# Patient Record
Sex: MALE | Race: WHITE | NOT HISPANIC OR LATINO | Employment: FULL TIME | URBAN - METROPOLITAN AREA
[De-identification: names, ages, dates, MRNs, and addresses within clinical notes are randomized per-mention and may not be internally consistent; named-entity substitution may affect disease eponyms.]

---

## 2019-02-06 ENCOUNTER — HOSPITAL ENCOUNTER (OUTPATIENT)
Facility: MEDICAL CENTER | Age: 64
End: 2019-02-07
Attending: INTERNAL MEDICINE | Admitting: INTERNAL MEDICINE
Payer: COMMERCIAL

## 2019-02-06 PROBLEM — E11.9 DIABETES MELLITUS TYPE 2 IN NONOBESE (HCC): Status: ACTIVE | Noted: 2019-02-06
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
## 2019-02-06 PROBLEM — I25.10 CORONARY ARTERY DISEASE: Status: ACTIVE | Noted: 2019-02-06

## 2019-02-06 LAB — GLUCOSE BLD-MCNC: 231 MG/DL (ref 65–99)

## 2019-02-06 PROCEDURE — A9270 NON-COVERED ITEM OR SERVICE: HCPCS | Performed by: INTERNAL MEDICINE

## 2019-02-06 PROCEDURE — 99220 PR INITIAL OBSERVATION CARE,LEVL III: CPT | Performed by: INTERNAL MEDICINE

## 2019-02-06 PROCEDURE — 700102 HCHG RX REV CODE 250 W/ 637 OVERRIDE(OP): Performed by: INTERNAL MEDICINE

## 2019-02-06 PROCEDURE — 82962 GLUCOSE BLOOD TEST: CPT

## 2019-02-06 PROCEDURE — G0378 HOSPITAL OBSERVATION PER HR: HCPCS

## 2019-02-06 RX ORDER — ONDANSETRON 4 MG/1
4 TABLET, ORALLY DISINTEGRATING ORAL EVERY 4 HOURS PRN
Status: DISCONTINUED | OUTPATIENT
Start: 2019-02-06 | End: 2019-02-07 | Stop reason: HOSPADM

## 2019-02-06 RX ORDER — ACETAMINOPHEN 325 MG/1
650 TABLET ORAL EVERY 8 HOURS PRN
Status: DISCONTINUED | OUTPATIENT
Start: 2019-02-06 | End: 2019-02-07 | Stop reason: HOSPADM

## 2019-02-06 RX ORDER — ONDANSETRON 2 MG/ML
4 INJECTION INTRAMUSCULAR; INTRAVENOUS EVERY 4 HOURS PRN
Status: DISCONTINUED | OUTPATIENT
Start: 2019-02-06 | End: 2019-02-07 | Stop reason: HOSPADM

## 2019-02-06 RX ORDER — ENALAPRILAT 1.25 MG/ML
1.25 INJECTION INTRAVENOUS EVERY 6 HOURS PRN
Status: DISCONTINUED | OUTPATIENT
Start: 2019-02-06 | End: 2019-02-07 | Stop reason: HOSPADM

## 2019-02-06 RX ORDER — BISACODYL 10 MG
10 SUPPOSITORY, RECTAL RECTAL
Status: DISCONTINUED | OUTPATIENT
Start: 2019-02-06 | End: 2019-02-07 | Stop reason: HOSPADM

## 2019-02-06 RX ORDER — DEXTROSE MONOHYDRATE 25 G/50ML
25 INJECTION, SOLUTION INTRAVENOUS
Status: DISCONTINUED | OUTPATIENT
Start: 2019-02-06 | End: 2019-02-07 | Stop reason: HOSPADM

## 2019-02-06 RX ORDER — POLYETHYLENE GLYCOL 3350 17 G/17G
1 POWDER, FOR SOLUTION ORAL
Status: DISCONTINUED | OUTPATIENT
Start: 2019-02-06 | End: 2019-02-07 | Stop reason: HOSPADM

## 2019-02-06 RX ORDER — AMOXICILLIN 250 MG
2 CAPSULE ORAL 2 TIMES DAILY
Status: DISCONTINUED | OUTPATIENT
Start: 2019-02-06 | End: 2019-02-07 | Stop reason: HOSPADM

## 2019-02-06 RX ADMIN — ACETAMINOPHEN 650 MG: 325 TABLET, FILM COATED ORAL at 19:06

## 2019-02-06 ASSESSMENT — ENCOUNTER SYMPTOMS
FEVER: 0
HEARTBURN: 0
NERVOUS/ANXIOUS: 0
NECK PAIN: 0
SEIZURES: 0
WEIGHT LOSS: 0
PALPITATIONS: 0
SPUTUM PRODUCTION: 0
INSOMNIA: 0
NAUSEA: 0
DEPRESSION: 0
DIARRHEA: 0
VOMITING: 0
ORTHOPNEA: 0
EYE REDNESS: 0
STRIDOR: 0
BLURRED VISION: 0
CHILLS: 0
ABDOMINAL PAIN: 0
MYALGIAS: 0
FOCAL WEAKNESS: 0
FALLS: 1
EYE PAIN: 0
EYE DISCHARGE: 0
COUGH: 0
HEADACHES: 0
DIZZINESS: 0
BACK PAIN: 0
SHORTNESS OF BREATH: 0

## 2019-02-06 ASSESSMENT — COGNITIVE AND FUNCTIONAL STATUS - GENERAL
MOBILITY SCORE: 24
DAILY ACTIVITIY SCORE: 24
SUGGESTED CMS G CODE MODIFIER MOBILITY: CH
SUGGESTED CMS G CODE MODIFIER DAILY ACTIVITY: CH

## 2019-02-06 ASSESSMENT — PATIENT HEALTH QUESTIONNAIRE - PHQ9
2. FEELING DOWN, DEPRESSED, IRRITABLE, OR HOPELESS: NOT AT ALL
SUM OF ALL RESPONSES TO PHQ9 QUESTIONS 1 AND 2: 0
1. LITTLE INTEREST OR PLEASURE IN DOING THINGS: NOT AT ALL

## 2019-02-06 ASSESSMENT — LIFESTYLE VARIABLES
EVER_SMOKED: YES
ALCOHOL_USE: NO

## 2019-02-06 NOTE — PROGRESS NOTES
Direct admit from [facility], referred by Dr. Tomas. For fall. Admitting MD is Dr. Craft. ADT order signed and held, to be released upon patient's arrival on the unit. Patient arriving via EMS ground. Page admitting hospitalist on call for orders when patient arrives.

## 2019-02-07 VITALS
OXYGEN SATURATION: 93 % | RESPIRATION RATE: 18 BRPM | HEIGHT: 69 IN | DIASTOLIC BLOOD PRESSURE: 64 MMHG | HEART RATE: 74 BPM | SYSTOLIC BLOOD PRESSURE: 119 MMHG | BODY MASS INDEX: 23.84 KG/M2 | WEIGHT: 160.94 LBS | TEMPERATURE: 98.6 F

## 2019-02-07 LAB
ALBUMIN SERPL BCP-MCNC: 3.6 G/DL (ref 3.2–4.9)
ALBUMIN/GLOB SERPL: 1.7 G/DL
ALP SERPL-CCNC: 79 U/L (ref 30–99)
ALT SERPL-CCNC: 21 U/L (ref 2–50)
ANION GAP SERPL CALC-SCNC: 6 MMOL/L (ref 0–11.9)
AST SERPL-CCNC: 14 U/L (ref 12–45)
BILIRUB SERPL-MCNC: 1 MG/DL (ref 0.1–1.5)
BUN SERPL-MCNC: 17 MG/DL (ref 8–22)
CALCIUM SERPL-MCNC: 8.7 MG/DL (ref 8.5–10.5)
CHLORIDE SERPL-SCNC: 104 MMOL/L (ref 96–112)
CO2 SERPL-SCNC: 26 MMOL/L (ref 20–33)
CREAT SERPL-MCNC: 0.88 MG/DL (ref 0.5–1.4)
ERYTHROCYTE [DISTWIDTH] IN BLOOD BY AUTOMATED COUNT: 41.5 FL (ref 35.9–50)
GLOBULIN SER CALC-MCNC: 2.1 G/DL (ref 1.9–3.5)
GLUCOSE BLD-MCNC: 176 MG/DL (ref 65–99)
GLUCOSE BLD-MCNC: 200 MG/DL (ref 65–99)
GLUCOSE SERPL-MCNC: 172 MG/DL (ref 65–99)
HCT VFR BLD AUTO: 45.2 % (ref 42–52)
HGB BLD-MCNC: 14.6 G/DL (ref 14–18)
MCH RBC QN AUTO: 27.5 PG (ref 27–33)
MCHC RBC AUTO-ENTMCNC: 32.3 G/DL (ref 33.7–35.3)
MCV RBC AUTO: 85.1 FL (ref 81.4–97.8)
PLATELET # BLD AUTO: 172 K/UL (ref 164–446)
PMV BLD AUTO: 10.2 FL (ref 9–12.9)
POTASSIUM SERPL-SCNC: 4.1 MMOL/L (ref 3.6–5.5)
PROT SERPL-MCNC: 5.7 G/DL (ref 6–8.2)
RBC # BLD AUTO: 5.31 M/UL (ref 4.7–6.1)
SODIUM SERPL-SCNC: 136 MMOL/L (ref 135–145)
WBC # BLD AUTO: 7.8 K/UL (ref 4.8–10.8)

## 2019-02-07 PROCEDURE — 96372 THER/PROPH/DIAG INJ SC/IM: CPT

## 2019-02-07 PROCEDURE — G0378 HOSPITAL OBSERVATION PER HR: HCPCS

## 2019-02-07 PROCEDURE — 97165 OT EVAL LOW COMPLEX 30 MIN: CPT

## 2019-02-07 PROCEDURE — 82962 GLUCOSE BLOOD TEST: CPT

## 2019-02-07 PROCEDURE — 85027 COMPLETE CBC AUTOMATED: CPT

## 2019-02-07 PROCEDURE — 36415 COLL VENOUS BLD VENIPUNCTURE: CPT

## 2019-02-07 PROCEDURE — 99217 PR OBSERVATION CARE DISCHARGE: CPT | Performed by: INTERNAL MEDICINE

## 2019-02-07 PROCEDURE — 80053 COMPREHEN METABOLIC PANEL: CPT

## 2019-02-07 RX ADMIN — INSULIN HUMAN 1 UNITS: 100 INJECTION, SOLUTION PARENTERAL at 07:41

## 2019-02-07 RX ADMIN — INSULIN HUMAN 1 UNITS: 100 INJECTION, SOLUTION PARENTERAL at 00:15

## 2019-02-07 ASSESSMENT — COGNITIVE AND FUNCTIONAL STATUS - GENERAL
SUGGESTED CMS G CODE MODIFIER DAILY ACTIVITY: CH
DAILY ACTIVITIY SCORE: 24

## 2019-02-07 ASSESSMENT — ACTIVITIES OF DAILY LIVING (ADL): TOILETING: INDEPENDENT

## 2019-02-07 NOTE — PROGRESS NOTES
AOx4, pain has improved since admission. Scratch observed on posterior of head, no drainage. PHELPS, -SOB, denies numbness/tingling. Steady gait, diabetic diet. Pt given copies of paperwork from [facility] for workman's comp, all questions answered regarding POC.

## 2019-02-07 NOTE — PROGRESS NOTES
Assumed pt care at 0700 and received bedside report.    Pt alert and oriented X 4. Pt denies chest pain, sob, nausea and vomiting, headache, and blurry or double vision. Pt denies numbness and tingling. Pt denies pain. Pt ambulating Ad Jeanne, tolerating well. Pt with concerns regarding workman's comp, will contact social work today.   POC discussed and education provided on administered medications. All questions and concerns addressed. Fall precautions, hourly rounding and Q4 hour neuro checks in place.

## 2019-02-07 NOTE — THERAPY
PT orders received. Per RN, pt anticipating DC home soon. Has been ambulating without difficulty. Acute PT eval not warranted at this time, orders DC'ed.     Emani Quintero, PT, DPT Pager: [phone]

## 2019-02-07 NOTE — THERAPY
"Occupational Therapy Evaluation completed.   Functional Status:  Pt presents to skilled OT services following fall resulting in L occipital scalp contusion. Pt was able to perform basic self care tasks, functional mobility and t/f's with supervision/MI. Pt denies any vision changes, HA, sensation, strength and coordination intact. Pt appears to be back to baseline and educated on use of ice for pain management and s/s to observe for any changes; stated understanding.   Plan of Care: Patient with no further skilled OT needs in the acute care setting at this time  Discharge Recommendations:  Equipment: No Equipment Needed. Post-acute therapy Anticipate that the patient will have no further occupational therapy needs after discharge from the hospital.       See \"Rehab Therapy-Acute\" Patient Summary Report for complete documentation.    "

## 2019-02-07 NOTE — PROGRESS NOTES
Patient to discharge Home with wife and daughter. Verbal and written discharge instructions provided to patient , verbalizes understanding. Patient verbalizes understanding. PIV removed. All personal belongings gathered. Pt refusing 11:00 BS check and insulin. Pt ambulated off unit with family and refused hospital escort.

## 2019-02-07 NOTE — DISCHARGE SUMMARY
Discharge Summary    CHIEF COMPLAINT ON ADMISSION  No chief complaint on file.      Reason for Admission  Fall     Admission Date  2/6/2019    CODE STATUS  Full Code    HPI & HOSPITAL COURSE  This is a 63 y.o. male here with PMH of diabetes mellitus, coronary artery disease status post a stent, who presents with above.  The patient stated that earlier today while he was walking he suddenly slipped and fell on the back of his head.  Suddenly after the fall he felt dizzy but he denied syncope episode or any neurological deficit.  After that he went on the dry to go to drive through [place] where he experienced severe vertigo for 2 times.  Therefore he was initially presented to outside facility where he had a CT scan of the head done with no acute finding.  He was taking aspirin and Plavix for his coronary artery disease.  He is here for observation since outside facility do not have inpatient bed.  Patient feels much better at time of discharge, with no pain/headache/dizziness/focal deficits.        Therefore, he is discharged in good and stable condition to home with close outpatient follow-up.    The patient recovered much more quickly than anticipated on admission.    Discharge Date  2/7/19     FOLLOW UP ITEMS POST DISCHARGE  pcp next week    DISCHARGE DIAGNOSES  Active Problems:    Fall POA: Yes    Diabetes mellitus type 2 in nonobese (HCC) POA: Yes    Coronary artery disease POA: Yes  Resolved Problems:    * No resolved hospital problems. *      FOLLOW UP  No future appointments.  No follow-up provider specified.    MEDICATIONS ON DISCHARGE     Medication List      You have not been prescribed any medications.         Allergies  No Known Allergies    DIET  Orders Placed This Encounter   Procedures   • Diet Order Diabetic     Standing Status:   Standing     Number of Occurrences:   1     Order Specific Question:   Diet:     Answer:   Diabetic [3]       ACTIVITY  As tolerated.  Weight bearing as  tolerated    CONSULTATIONS  none    PROCEDURES  none    LABORATORY  Lab Results   Component Value Date    SODIUM 136 02/07/2019    POTASSIUM 4.1 02/07/2019    CHLORIDE 104 02/07/2019    CO2 26 02/07/2019    GLUCOSE 172 (H) 02/07/2019    BUN 17 02/07/2019    CREATININE 0.88 02/07/2019        Lab Results   Component Value Date    WBC 7.8 02/07/2019    HEMOGLOBIN 14.6 02/07/2019    HEMATOCRIT 45.2 02/07/2019    PLATELETCT 172 02/07/2019        Total time of the discharge process exceeds 30 minutes.

## 2019-02-07 NOTE — DISCHARGE PLANNING
Anticipated Discharge Disposition:   Home with help from wife.    Action:    Spoke with patient and assessment completed.  Requested information for worker's compensation processing.  RN CM provided ROBERTO form for patient to fill out and fax to medical records.    Barriers to Discharge:    None    Plan:    DC    Care Transition Team Assessment    Information Source  Orientation : Oriented x 4  Information Given By: Patient  Informant's Name:  (Vlad Edwards)  Who is responsible for making decisions for patient? : Patient    Readmission Evaluation  Is this a readmission?: No    Elopement Risk  Legal Hold: No  Ambulatory or Self Mobile in Wheelchair: Yes  Disoriented: No  Psychiatric Symptoms: None  History of Wandering: No  Elopement this Admit: No  Vocalizing Wanting to Leave: No  Displays Behaviors, Body Language Wanting to Leave: No-Not at Risk for Elopement  Elopement Risk: Not at Risk for Elopement    Interdisciplinary Discharge Planning  Lives with - Patient's Self Care Capacity: Spouse  Housing / Facility: [address] [place] [address]  Prior Services: None    Discharge Preparedness  What is your plan after discharge?: Home with help  What are your discharge supports?: Spouse  Prior Functional Level: Ambulatory, Drives Self, Independent with Activities of Daily Living, Independent with Medication Management  Difficulity with ADLs: None  Difficulity with IADLs: None    Functional Assesment  Prior Functional Level: Ambulatory, Drives Self, Independent with Activities of Daily Living, Independent with Medication Management    Finances  Financial Barriers to Discharge: No    Vision / Hearing Impairment  Vision Impairment : Yes  Right Eye Vision: Wears Glasses  Left Eye Vision: Wears Glasses  Hearing Impairment : No    Values / Beliefs / Concerns  Values / Beliefs Concerns : No    Advance Directive  Advance Directive?: None    Domestic Abuse  Have you ever been the victim of abuse or violence?: No  Physical Abuse or Sexual Abuse:  No  Verbal Abuse or Emotional Abuse: No  Possible Abuse Reported to:: Not Applicable         Discharge Risks or Barriers  Discharge risks or barriers?: No    Anticipated Discharge Information  Anticipated discharge disposition: Home  Discharge Address: [facility] [address] [facility] [address]  Discharge Contact Phone Number: [phone]

## 2019-02-07 NOTE — DISCHARGE INSTRUCTIONS
Discharge Instructions    F/u with pcp next week and return to ED for worsening symptoms    Discharged to home by car with relative. Discharged via walking, hospital escort: Yes.  Special equipment needed: Not Applicable    Be sure to schedule a follow-up appointment with your primary care doctor or any specialists as instructed.     Discharge Plan:   Diet Plan: Discussed  Activity Level: Discussed  Smoking Cessation Offered: Patient Refused  Confirmed Follow up Appointment: Patient to Call and Schedule Appointment  Confirmed Symptoms Management: Discussed  Medication Reconciliation Updated: Yes  Pneumococcal Vaccine Administered/Refused: Not given - Patient refused pneumococcal vaccine  Influenza Vaccine Indication: Patient Refuses    I understand that a diet low in cholesterol, fat, and sodium is recommended for good health. Unless I have been given specific instructions below for another diet, I accept this instruction as my diet prescription.   Other diet: Diabetic    Special Instructions: None    · Is patient discharged on Warfarin / Coumadin?   No     Depression / Suicide Risk    As you are discharged from this [facility] facility, it is important to learn how to keep safe from harming yourself.    Recognize the warning signs:  · Abrupt changes in personality, positive or negative- including increase in energy   · Giving away possessions  · Change in eating patterns- significant weight changes-  positive or negative  · Change in sleeping patterns- unable to sleep or sleeping all the time   · Unwillingness or inability to communicate  · Depression  · Unusual sadness, discouragement and loneliness  · Talk of wanting to die  · Neglect of personal appearance   · Rebelliousness- reckless behavior  · Withdrawal from people/activities they love  · Confusion- inability to concentrate     If you or a loved one observes any of these behaviors or has concerns about self-harm, here's what you can do:  · Talk about it-  "your feelings and reasons for harming yourself  · Remove any means that you might use to hurt yourself (examples: pills, rope, extension cords, firearm)  · Get professional help from the community (Mental Health, Substance Abuse, psychological counseling)  · Do not be alone:Call your Safe Contact- someone whom you trust who will be there for you.  · Call your local CRISIS HOTLINE 177-9098 or 511-932-8108  · Call your local Children's Mobile Crisis Response Team [place] (799) 507-4213 or wwwSkype  · Call the toll free National Suicide Prevention Hotlines   · National Suicide Prevention Lifeline 711-354-JTFK (7999)  · [employer] Line Network 800-SUICIDE (727-7054)    Prevent Falls in Your Home    \"Falling once doubles your chance of falling again\"        -Center for Disease Control and Prevention    Falls in the home can lead to serious injury (fractures, brain injuries), hospitalizations, increased medical costs, and could even be fatal.  The good news is, there are many precautions you can take to avoid falls in your home and help keep you safe:     · If prescribed an assistive device (walker, crutches), use as instructed by the healthcare provider\"   · Remove any tripping hazards from your home, including loose cords, throw rugs and clutter  · Keep a nightlight on in dark (hallways, bathrooms, etc)   · Get up slowly, to make sure you feel okay before getting up  · Be aware of any side effects of your medications: some medications may make you dizzy  · Place a non-skid rubber mat in your shower or tub-consider a shower bench or chair if unsteady on your feet  · Wear supportive shoes or non-skid socks when moving around  · Start an exercise program once approved by your provider.  If you are feeling weak following a hospital stay, talk to your doctor about home health or outpatient therapy programs designed to help rebuild your strength and endurance      Get help right away if:  · You have:  ¨ A " very bad (severe) headache that is not helped by medicine.  ¨ Trouble walking or weakness in your arms and legs.  ¨ Clear or bloody fluid coming from your nose or ears.  ¨ Changes in your seeing (vision).  ¨ Jerky movements that you cannot control (seizure).  · You throw up (vomit).  · Your symptoms get worse.  · You lose balance.  · Your speech is slurred.  · You pass out.  · You are sleepier and have trouble staying awake.  · The black centers of your eyes (pupils) change in size.  These symptoms may be an emergency. Do not wait to see if the symptoms will go away. Get medical help right away. Call your local emergency services (911 in the [place] Do not drive yourself to the hospital.   This information is not intended to replace advice given to you by your health care provider. Make sure you discuss any questions you have with your health care provider.  Document Released: 11/30/2009 Document Revised: 07/13/2017 Document Reviewed: 06/27/2017  Elsevier Interactive Patient Education © 2017 Elsevier Inc.

## 2019-02-07 NOTE — ASSESSMENT & PLAN NOTE
No acute issue on CT scan at outside facility is a small left parietal occipital scalp contusion without skull fracture or intracranial abnormality  Monitor neuro check every 4 hours  If noticed to have new onset neurologic deficit will get CT scan stat  Holding his aspirin and Plavix  PT and OT  If remain asymptomatic consider discharging home tomorrow

## 2019-02-07 NOTE — PROGRESS NOTES
Pt arrived approx 1700. Aox4. PHELPS. Denies n/t. Denies vertigo at this time. Wants to eat. Provided snack and water. Contreras called for dinner tray. Reporting headache, Dr. Craft notified, orders received, tylenol given. Family at bedside. Educated on call light, bed controls, fall precautions, bed alarm, calling for assistance. Verbalized understanding. Family at bedside.

## 2019-02-07 NOTE — H&P
Hospital Medicine History and Physical      Date of Service  2/6/2019    Chief Complaint  Direct admit from fall with left occipital scalp contusion    History of Presenting Illness  Jerry is a 63 y.o. male PMH of diabetes mellitus, coronary artery disease status post a stent, who presents with above.  The patient stated that earlier today while he was walking he suddenly slipped and fell on the back of his head.  Suddenly after the fall he felt dizzy but he denied syncope episode or any neurological deficit.  After that he went on the dry to go to drive through [facility] where he experienced severe vertigo for 2 times.  Therefore he was initially presented to outside facility where he had a CT scan of the head done with no acute finding.  He was taking aspirin and Plavix for his coronary artery disease.  He is here for observation since outside facility do not have inpatient bed.    Primary Care Physician  Luis Burciaga M.D.      Code Status  Full code    Review of Systems  Review of Systems   Constitutional: Negative for chills, fever and weight loss.   HENT: Negative for congestion and nosebleeds.    Eyes: Negative for blurred vision, pain, discharge and redness.   Respiratory: Negative for cough, sputum production, shortness of breath and stridor.    Cardiovascular: Negative for chest pain, palpitations and orthopnea.   Gastrointestinal: Negative for abdominal pain, diarrhea, heartburn, nausea and vomiting.   Genitourinary: Negative for dysuria, frequency and urgency.   Musculoskeletal: Positive for falls. Negative for back pain, myalgias and neck pain.   Skin: Negative for itching and rash.   Neurological: Negative for dizziness, focal weakness, seizures and headaches.   Psychiatric/Behavioral: Negative for depression. The patient is not nervous/anxious and does not have insomnia.      Please see HPI, all other systems were reviewed and are negative (AMA/CMS criteria)     Past Medical History  Past Medical  History:   Diagnosis Date   • MI, old        Surgical History  Past Surgical History:   Procedure Laterality Date   • OTHER      cardiac stents   • OTHER ORTHOPEDIC SURGERY      back surgery       Medications  No current facility-administered medications on file prior to encounter.      No current outpatient prescriptions on file prior to encounter.     Family History    No pertinent family history    Social History  Social History   Substance Use Topics   • Smoking status: Current Every Day Smoker     Packs/day: 1.00     Types: Cigarettes   • Smokeless tobacco: Not on file   • Alcohol use No       Allergies  No Known Allergies     Physical Exam  Laboratory   Hemodynamics  Temp (24hrs), Av.3 °C (97.3 °F), Min:36.3 °C (97.3 °F), Max:36.3 °C (97.3 °F)   Temperature: 36.3 °C (97.3 °F)  Pulse  Av  Min: 76  Max: 76    Blood Pressure : 140/64      Respiratory      Respiration: 15, Pulse Oximetry: 93 %             Physical Exam   Constitutional: He is oriented to person, place, and time. No distress.   HENT:   Head: Normocephalic and atraumatic.   Mouth/Throat: Oropharynx is clear and moist.   Eyes: Pupils are equal, round, and reactive to light. Conjunctivae and EOM are normal.   Neck: Normal range of motion. Neck supple. No tracheal deviation present. No thyromegaly present.   Cardiovascular: Normal rate and regular rhythm.    No murmur heard.  Pulmonary/Chest: Effort normal and breath sounds normal. No respiratory distress. He has no wheezes.   Abdominal: Soft. Bowel sounds are normal. He exhibits no distension. There is no tenderness.   Musculoskeletal: He exhibits no edema or tenderness.   Neurological: He is alert and oriented to person, place, and time. No cranial nerve deficit.   Skin: Skin is warm and dry. He is not diaphoretic. No erythema.   Psychiatric: He has a normal mood and affect. His behavior is normal. Thought content normal.               No results for input(s): ALTSGPT, ASTSGOT, ALKPHOSPHAT,  TBILIRUBIN, DBILIRUBIN, GAMMAGT, AMYLASE, LIPASE, ALB, PREALBUMIN, GLUCOSE in the last 72 hours.              No results found for: TROPONINI    Imaging  No orders to display     Labs:  Hemoglobin 15, WBC 10, platelet 180  Troponin 0 0.04, BNP 31, sodium 136, potassium 4.6, CO2 26, chloride 102, glucose 177, BUN 18, creatinine 0.78    CT scan of the head as below  Cervical spine x-ray no acute issue identified   Assessment/Plan     I anticipate this patient is appropriate for observation status at this time.    Coronary artery disease- (present on admission)   Assessment & Plan    History of a stents in the past  Currently holding aspirin and Plavix  Stable     Diabetes mellitus type 2 in nonobese (HCC)- (present on admission)   Assessment & Plan    On insulin management     Fall- (present on admission)   Assessment & Plan    No acute issue on CT scan at outside facility is a small left parietal occipital scalp contusion without skull fracture or intracranial abnormality  Monitor neuro check every 4 hours  If noticed to have new onset neurologic deficit will get CT scan stat  Holding his aspirin and Plavix  PT and OT  If remain asymptomatic consider discharging home tomorrow         Prophylaxis:  SCDs

## 2021-05-10 ENCOUNTER — APPOINTMENT (OUTPATIENT)
Dept: CT IMAGING | Age: 66
DRG: 689 | End: 2021-05-10
Payer: MEDICARE

## 2021-05-10 ENCOUNTER — HOSPITAL ENCOUNTER (INPATIENT)
Age: 66
LOS: 5 days | Discharge: HOME | DRG: 689 | End: 2021-05-15
Attending: EMERGENCY MEDICINE | Admitting: STUDENT IN AN ORGANIZED HEALTH CARE EDUCATION/TRAINING PROGRAM
Payer: MEDICARE

## 2021-05-10 DIAGNOSIS — R31.9 HEMATURIA, UNSPECIFIED TYPE: ICD-10-CM

## 2021-05-10 DIAGNOSIS — R33.9 URINARY RETENTION: Primary | ICD-10-CM

## 2021-05-10 DIAGNOSIS — N17.9 AKI (ACUTE KIDNEY INJURY) (HCC): ICD-10-CM

## 2021-05-10 PROBLEM — R31.0 GROSS HEMATURIA: Status: ACTIVE | Noted: 2021-05-10

## 2021-05-10 PROBLEM — F17.210 HEAVY CIGARETTE SMOKER (20-39 PER DAY): Status: ACTIVE | Noted: 2021-05-10

## 2021-05-10 PROBLEM — Z80.0 FAMILY HISTORY OF STOMACH CANCER: Status: ACTIVE | Noted: 2021-05-10

## 2021-05-10 PROBLEM — F41.8 ANXIETY ABOUT HEALTH: Status: ACTIVE | Noted: 2021-05-10

## 2021-05-10 PROBLEM — Z82.0 FAMILY HISTORY OF ALZHEIMER'S DISEASE: Status: ACTIVE | Noted: 2021-05-10

## 2021-05-10 LAB
-: ABNORMAL
ABSOLUTE EOS #: 0.13 K/UL (ref 0–0.44)
ABSOLUTE IMMATURE GRANULOCYTE: 0.04 K/UL (ref 0–0.3)
ABSOLUTE LYMPH #: 2.55 K/UL (ref 1.1–3.7)
ABSOLUTE MONO #: 0.69 K/UL (ref 0.1–1.2)
AMORPHOUS: ABNORMAL
ANION GAP SERPL CALCULATED.3IONS-SCNC: 11 MMOL/L (ref 9–17)
BACTERIA: ABNORMAL
BASOPHILS # BLD: 1 % (ref 0–2)
BASOPHILS ABSOLUTE: 0.05 K/UL (ref 0–0.2)
BILIRUBIN URINE: NEGATIVE
BUN BLDV-MCNC: 14 MG/DL (ref 8–23)
BUN/CREAT BLD: 8 (ref 9–20)
CALCIUM SERPL-MCNC: 9.4 MG/DL (ref 8.6–10.4)
CASTS UA: ABNORMAL /LPF
CHLORIDE BLD-SCNC: 102 MMOL/L (ref 98–107)
CO2: 25 MMOL/L (ref 20–31)
COLOR: ABNORMAL
COMMENT UA: ABNORMAL
CREAT SERPL-MCNC: 1.74 MG/DL (ref 0.7–1.2)
CRYSTALS, UA: ABNORMAL /HPF
DIFFERENTIAL TYPE: ABNORMAL
EOSINOPHILS RELATIVE PERCENT: 1 % (ref 1–4)
EPITHELIAL CELLS UA: ABNORMAL /HPF (ref 0–5)
GFR AFRICAN AMERICAN: 48 ML/MIN
GFR NON-AFRICAN AMERICAN: 39 ML/MIN
GFR SERPL CREATININE-BSD FRML MDRD: ABNORMAL ML/MIN/{1.73_M2}
GFR SERPL CREATININE-BSD FRML MDRD: ABNORMAL ML/MIN/{1.73_M2}
GLUCOSE BLD-MCNC: 106 MG/DL (ref 70–99)
GLUCOSE URINE: NEGATIVE
HCT VFR BLD CALC: 35.6 % (ref 40.7–50.3)
HEMOGLOBIN: 11.2 G/DL (ref 13–17)
IMMATURE GRANULOCYTES: 0 %
KETONES, URINE: ABNORMAL
LEUKOCYTE ESTERASE, URINE: ABNORMAL
LYMPHOCYTES # BLD: 24 % (ref 24–43)
MCH RBC QN AUTO: 27.9 PG (ref 25.2–33.5)
MCHC RBC AUTO-ENTMCNC: 31.5 G/DL (ref 28.4–34.8)
MCV RBC AUTO: 88.6 FL (ref 82.6–102.9)
MONOCYTES # BLD: 7 % (ref 3–12)
MUCUS: ABNORMAL
NITRITE, URINE: POSITIVE
NRBC AUTOMATED: 0 PER 100 WBC
OTHER OBSERVATIONS UA: ABNORMAL
PDW BLD-RTO: 13.5 % (ref 11.8–14.4)
PH UA: 7 (ref 5–8)
PLATELET # BLD: 303 K/UL (ref 138–453)
PLATELET ESTIMATE: ABNORMAL
PMV BLD AUTO: 8.3 FL (ref 8.1–13.5)
POTASSIUM SERPL-SCNC: 3.2 MMOL/L (ref 3.7–5.3)
PROSTATE SPECIFIC ANTIGEN: 43.54 UG/L
PROTEIN UA: ABNORMAL
RBC # BLD: 4.02 M/UL (ref 4.21–5.77)
RBC # BLD: ABNORMAL 10*6/UL
RBC UA: ABNORMAL /HPF (ref 0–2)
RENAL EPITHELIAL, UA: ABNORMAL /HPF
SEG NEUTROPHILS: 67 % (ref 36–65)
SEGMENTED NEUTROPHILS ABSOLUTE COUNT: 7.08 K/UL (ref 1.5–8.1)
SODIUM BLD-SCNC: 138 MMOL/L (ref 135–144)
SPECIFIC GRAVITY UA: 1.02 (ref 1–1.03)
TRICHOMONAS: ABNORMAL
TURBIDITY: ABNORMAL
URINE HGB: ABNORMAL
UROBILINOGEN, URINE: ABNORMAL
WBC # BLD: 10.5 K/UL (ref 3.5–11.3)
WBC # BLD: ABNORMAL 10*3/UL
WBC UA: ABNORMAL /HPF (ref 0–5)
YEAST: ABNORMAL

## 2021-05-10 PROCEDURE — 2580000003 HC RX 258: Performed by: NURSE PRACTITIONER

## 2021-05-10 PROCEDURE — 2060000000 HC ICU INTERMEDIATE R&B

## 2021-05-10 PROCEDURE — 51798 US URINE CAPACITY MEASURE: CPT

## 2021-05-10 PROCEDURE — 84153 ASSAY OF PSA TOTAL: CPT

## 2021-05-10 PROCEDURE — 6360000002 HC RX W HCPCS: Performed by: NURSE PRACTITIONER

## 2021-05-10 PROCEDURE — 87040 BLOOD CULTURE FOR BACTERIA: CPT

## 2021-05-10 PROCEDURE — 81001 URINALYSIS AUTO W/SCOPE: CPT

## 2021-05-10 PROCEDURE — 74176 CT ABD & PELVIS W/O CONTRAST: CPT

## 2021-05-10 PROCEDURE — 96365 THER/PROPH/DIAG IV INF INIT: CPT

## 2021-05-10 PROCEDURE — 80048 BASIC METABOLIC PNL TOTAL CA: CPT

## 2021-05-10 PROCEDURE — 99222 1ST HOSP IP/OBS MODERATE 55: CPT | Performed by: STUDENT IN AN ORGANIZED HEALTH CARE EDUCATION/TRAINING PROGRAM

## 2021-05-10 PROCEDURE — 99283 EMERGENCY DEPT VISIT LOW MDM: CPT

## 2021-05-10 PROCEDURE — 2580000003 HC RX 258: Performed by: STUDENT IN AN ORGANIZED HEALTH CARE EDUCATION/TRAINING PROGRAM

## 2021-05-10 PROCEDURE — 83036 HEMOGLOBIN GLYCOSYLATED A1C: CPT

## 2021-05-10 PROCEDURE — 85025 COMPLETE CBC W/AUTO DIFF WBC: CPT

## 2021-05-10 PROCEDURE — 6360000002 HC RX W HCPCS: Performed by: STUDENT IN AN ORGANIZED HEALTH CARE EDUCATION/TRAINING PROGRAM

## 2021-05-10 PROCEDURE — 6370000000 HC RX 637 (ALT 250 FOR IP): Performed by: STUDENT IN AN ORGANIZED HEALTH CARE EDUCATION/TRAINING PROGRAM

## 2021-05-10 PROCEDURE — 93005 ELECTROCARDIOGRAM TRACING: CPT | Performed by: STUDENT IN AN ORGANIZED HEALTH CARE EDUCATION/TRAINING PROGRAM

## 2021-05-10 RX ORDER — NICOTINE 21 MG/24HR
1 PATCH, TRANSDERMAL 24 HOURS TRANSDERMAL DAILY
Status: DISCONTINUED | OUTPATIENT
Start: 2021-05-10 | End: 2021-05-10

## 2021-05-10 RX ORDER — FAMOTIDINE 20 MG/1
20 TABLET, FILM COATED ORAL DAILY
Status: DISCONTINUED | OUTPATIENT
Start: 2021-05-10 | End: 2021-05-15 | Stop reason: HOSPADM

## 2021-05-10 RX ORDER — SODIUM CHLORIDE 9 MG/ML
INJECTION, SOLUTION INTRAVENOUS CONTINUOUS
Status: DISCONTINUED | OUTPATIENT
Start: 2021-05-10 | End: 2021-05-15

## 2021-05-10 RX ORDER — ACETAMINOPHEN 650 MG/1
650 SUPPOSITORY RECTAL EVERY 6 HOURS PRN
Status: DISCONTINUED | OUTPATIENT
Start: 2021-05-10 | End: 2021-05-15 | Stop reason: HOSPADM

## 2021-05-10 RX ORDER — PROMETHAZINE HYDROCHLORIDE 12.5 MG/1
12.5 TABLET ORAL EVERY 6 HOURS PRN
Status: DISCONTINUED | OUTPATIENT
Start: 2021-05-10 | End: 2021-05-15 | Stop reason: HOSPADM

## 2021-05-10 RX ORDER — ACETAMINOPHEN 325 MG/1
650 TABLET ORAL EVERY 6 HOURS PRN
Status: DISCONTINUED | OUTPATIENT
Start: 2021-05-10 | End: 2021-05-15 | Stop reason: HOSPADM

## 2021-05-10 RX ORDER — SODIUM CHLORIDE 0.9 % (FLUSH) 0.9 %
5-40 SYRINGE (ML) INJECTION EVERY 12 HOURS SCHEDULED
Status: DISCONTINUED | OUTPATIENT
Start: 2021-05-10 | End: 2021-05-15 | Stop reason: HOSPADM

## 2021-05-10 RX ORDER — ALPRAZOLAM 0.25 MG/1
0.5 TABLET ORAL 3 TIMES DAILY PRN
Status: DISCONTINUED | OUTPATIENT
Start: 2021-05-10 | End: 2021-05-15 | Stop reason: HOSPADM

## 2021-05-10 RX ORDER — SENNA AND DOCUSATE SODIUM 50; 8.6 MG/1; MG/1
1 TABLET, FILM COATED ORAL DAILY
COMMUNITY

## 2021-05-10 RX ORDER — LIDOCAINE HYDROCHLORIDE 20 MG/ML
5 JELLY TOPICAL PRN
Status: DISCONTINUED | OUTPATIENT
Start: 2021-05-10 | End: 2021-05-15 | Stop reason: HOSPADM

## 2021-05-10 RX ORDER — SODIUM CHLORIDE 9 MG/ML
25 INJECTION, SOLUTION INTRAVENOUS PRN
Status: DISCONTINUED | OUTPATIENT
Start: 2021-05-10 | End: 2021-05-15 | Stop reason: HOSPADM

## 2021-05-10 RX ORDER — NICOTINE 21 MG/24HR
1 PATCH, TRANSDERMAL 24 HOURS TRANSDERMAL DAILY
Status: DISCONTINUED | OUTPATIENT
Start: 2021-05-10 | End: 2021-05-15 | Stop reason: HOSPADM

## 2021-05-10 RX ORDER — SODIUM CHLORIDE 0.9 % (FLUSH) 0.9 %
5-40 SYRINGE (ML) INJECTION PRN
Status: DISCONTINUED | OUTPATIENT
Start: 2021-05-10 | End: 2021-05-15 | Stop reason: HOSPADM

## 2021-05-10 RX ORDER — LORAZEPAM 2 MG/ML
0.5 INJECTION INTRAMUSCULAR ONCE
Status: COMPLETED | OUTPATIENT
Start: 2021-05-10 | End: 2021-05-10

## 2021-05-10 RX ORDER — MORPHINE SULFATE 4 MG/ML
4 INJECTION, SOLUTION INTRAMUSCULAR; INTRAVENOUS
Status: DISCONTINUED | OUTPATIENT
Start: 2021-05-10 | End: 2021-05-15 | Stop reason: HOSPADM

## 2021-05-10 RX ORDER — ONDANSETRON 2 MG/ML
4 INJECTION INTRAMUSCULAR; INTRAVENOUS EVERY 6 HOURS PRN
Status: DISCONTINUED | OUTPATIENT
Start: 2021-05-10 | End: 2021-05-15 | Stop reason: HOSPADM

## 2021-05-10 RX ORDER — MORPHINE SULFATE 2 MG/ML
2 INJECTION, SOLUTION INTRAMUSCULAR; INTRAVENOUS
Status: DISCONTINUED | OUTPATIENT
Start: 2021-05-10 | End: 2021-05-15 | Stop reason: HOSPADM

## 2021-05-10 RX ADMIN — LORAZEPAM 0.5 MG: 2 INJECTION, SOLUTION INTRAMUSCULAR; INTRAVENOUS at 20:07

## 2021-05-10 RX ADMIN — CEFTRIAXONE SODIUM 1000 MG: 1 INJECTION, POWDER, FOR SOLUTION INTRAMUSCULAR; INTRAVENOUS at 18:32

## 2021-05-10 RX ADMIN — SODIUM CHLORIDE: 9 INJECTION, SOLUTION INTRAVENOUS at 20:07

## 2021-05-10 RX ADMIN — MORPHINE SULFATE 4 MG: 4 INJECTION, SOLUTION INTRAMUSCULAR; INTRAVENOUS at 23:18

## 2021-05-10 SDOH — HEALTH STABILITY: MENTAL HEALTH: HOW OFTEN DO YOU HAVE A DRINK CONTAINING ALCOHOL?: NEVER

## 2021-05-10 ASSESSMENT — ENCOUNTER SYMPTOMS
CONSTIPATION: 0
NAUSEA: 0
ABDOMINAL PAIN: 0
SINUS PRESSURE: 0
WHEEZING: 0
SHORTNESS OF BREATH: 0
COUGH: 0
VOMITING: 0
COLOR CHANGE: 0
RHINORRHEA: 0
DIARRHEA: 0
SORE THROAT: 0

## 2021-05-10 ASSESSMENT — PAIN DESCRIPTION - DESCRIPTORS: DESCRIPTORS: BURNING

## 2021-05-10 ASSESSMENT — PAIN DESCRIPTION - FREQUENCY: FREQUENCY: INTERMITTENT

## 2021-05-10 ASSESSMENT — PAIN DESCRIPTION - LOCATION: LOCATION: PENIS

## 2021-05-10 NOTE — ED PROVIDER NOTES
[address] [place] [address] ED  EMERGENCY DEPARTMENT ENCOUNTER   ATTENDING ATTESTATION     Pt Name: Luann Evans  MRN: [id number]  Sonali 1955  Date of evaluation: 5/10/21       Luann Evans is a 77 y.o. male who presents with Hematuria (onset this am, sent from urgent care)      MDM:     57-year-old male, gross hematuria with urinary retention. Plan is Paige catheter insertion, basic labs urine and reevaluation. Vitals:   Vitals:    05/10/21 1447   BP: (!) 183/99   Pulse: 113   Resp: 16   Temp: 98 °F (36.7 °C)   SpO2: 100%   Weight: 152 lb (68.9 kg)   Height: 6' (1.829 m)         I personally evaluated and examined the patient in conjunction with the Midlevel provider and agree with the assessment, treatment plan, and disposition of the patient as recorded by the midlevel. I performed a history and physical examination of the patient and discussed management with the midlevel. I reviewed the midlevels note and agree with the documented findings and plan of care. Any areas of disagreement are noted on the chart. I was personally present for the key portions of any procedures. I have documented in the chart those procedures where I was not present during the key portions. I have personally reviewed all images and agree with the midlevel's interpretation. I have reviewed the emergency nurses triage note. I agree with the chief complaint, past medical history, past surgical history, allergies, medications, social and family history as documented unless otherwise noted.     Shantanu Cooper MD  Attending Emergency  Physician                 Misha Amos MD  05/10/21 6754

## 2021-05-10 NOTE — ED NOTES
Pt was seen at urgent care prior to coming to ed and was told to come to ed for further evaluation. Pt states he does not currently have a pcp.       Izzy Osorio RN  05/10/21 2881

## 2021-05-10 NOTE — ED NOTES
Pt states he does not want an Iv placed until he speaks to Dr. Otis Hanks. Pt states he is unsure if he wants to be admitted.       An Gonzalez RN  05/10/21 9911

## 2021-05-11 PROBLEM — N13.30 BILATERAL HYDRONEPHROSIS: Status: ACTIVE | Noted: 2021-05-11

## 2021-05-11 PROBLEM — N40.1 BPH WITH OBSTRUCTION/LOWER URINARY TRACT SYMPTOMS: Status: ACTIVE | Noted: 2021-05-11

## 2021-05-11 PROBLEM — N13.8 BPH WITH OBSTRUCTION/LOWER URINARY TRACT SYMPTOMS: Status: ACTIVE | Noted: 2021-05-11

## 2021-05-11 PROBLEM — R97.20 ELEVATED PSA: Status: ACTIVE | Noted: 2021-05-11

## 2021-05-11 PROBLEM — N17.9 AKI (ACUTE KIDNEY INJURY) (HCC): Status: ACTIVE | Noted: 2021-05-11

## 2021-05-11 LAB
ANION GAP SERPL CALCULATED.3IONS-SCNC: 15 MMOL/L (ref 9–17)
BUN BLDV-MCNC: 17 MG/DL (ref 8–23)
BUN/CREAT BLD: 6 (ref 9–20)
CALCIUM SERPL-MCNC: 8.9 MG/DL (ref 8.6–10.4)
CHLORIDE BLD-SCNC: 105 MMOL/L (ref 98–107)
CO2: 22 MMOL/L (ref 20–31)
CREAT SERPL-MCNC: 2.84 MG/DL (ref 0.7–1.2)
EKG ATRIAL RATE: 135 BPM
EKG P AXIS: 81 DEGREES
EKG P-R INTERVAL: 114 MS
EKG Q-T INTERVAL: 318 MS
EKG QRS DURATION: 88 MS
EKG QTC CALCULATION (BAZETT): 477 MS
EKG R AXIS: 78 DEGREES
EKG T AXIS: 63 DEGREES
EKG VENTRICULAR RATE: 135 BPM
ESTIMATED AVERAGE GLUCOSE: 85 MG/DL
GFR AFRICAN AMERICAN: 27 ML/MIN
GFR NON-AFRICAN AMERICAN: 22 ML/MIN
GFR SERPL CREATININE-BSD FRML MDRD: ABNORMAL ML/MIN/{1.73_M2}
GFR SERPL CREATININE-BSD FRML MDRD: ABNORMAL ML/MIN/{1.73_M2}
GLUCOSE BLD-MCNC: 102 MG/DL (ref 70–99)
HBA1C MFR BLD: 4.6 % (ref 4–6)
MAGNESIUM: 1.8 MG/DL (ref 1.6–2.6)
POTASSIUM SERPL-SCNC: 3.4 MMOL/L (ref 3.7–5.3)
SODIUM BLD-SCNC: 142 MMOL/L (ref 135–144)

## 2021-05-11 PROCEDURE — 2060000000 HC ICU INTERMEDIATE R&B

## 2021-05-11 PROCEDURE — 6370000000 HC RX 637 (ALT 250 FOR IP): Performed by: UROLOGY

## 2021-05-11 PROCEDURE — 93010 ELECTROCARDIOGRAM REPORT: CPT | Performed by: INTERNAL MEDICINE

## 2021-05-11 PROCEDURE — 80048 BASIC METABOLIC PNL TOTAL CA: CPT

## 2021-05-11 PROCEDURE — 6370000000 HC RX 637 (ALT 250 FOR IP): Performed by: STUDENT IN AN ORGANIZED HEALTH CARE EDUCATION/TRAINING PROGRAM

## 2021-05-11 PROCEDURE — 6360000002 HC RX W HCPCS: Performed by: STUDENT IN AN ORGANIZED HEALTH CARE EDUCATION/TRAINING PROGRAM

## 2021-05-11 PROCEDURE — 6360000002 HC RX W HCPCS: Performed by: NURSE PRACTITIONER

## 2021-05-11 PROCEDURE — 2580000003 HC RX 258: Performed by: STUDENT IN AN ORGANIZED HEALTH CARE EDUCATION/TRAINING PROGRAM

## 2021-05-11 PROCEDURE — 99232 SBSQ HOSP IP/OBS MODERATE 35: CPT | Performed by: INTERNAL MEDICINE

## 2021-05-11 PROCEDURE — 83735 ASSAY OF MAGNESIUM: CPT

## 2021-05-11 RX ORDER — OXYBUTYNIN CHLORIDE 5 MG/1
5 TABLET ORAL 3 TIMES DAILY
Status: DISCONTINUED | OUTPATIENT
Start: 2021-05-11 | End: 2021-05-11

## 2021-05-11 RX ORDER — OXYBUTYNIN CHLORIDE 5 MG/1
5 TABLET ORAL 3 TIMES DAILY PRN
Status: DISCONTINUED | OUTPATIENT
Start: 2021-05-11 | End: 2021-05-15 | Stop reason: HOSPADM

## 2021-05-11 RX ADMIN — OXYBUTYNIN CHLORIDE 5 MG: 5 TABLET ORAL at 17:30

## 2021-05-11 RX ADMIN — MORPHINE SULFATE 4 MG: 4 INJECTION, SOLUTION INTRAMUSCULAR; INTRAVENOUS at 20:12

## 2021-05-11 RX ADMIN — ALPRAZOLAM 0.5 MG: 0.25 TABLET ORAL at 17:30

## 2021-05-11 RX ADMIN — CEFTRIAXONE SODIUM 1000 MG: 1 INJECTION, POWDER, FOR SOLUTION INTRAMUSCULAR; INTRAVENOUS at 17:10

## 2021-05-11 RX ADMIN — OXYBUTYNIN CHLORIDE 5 MG: 5 TABLET ORAL at 08:30

## 2021-05-11 RX ADMIN — MORPHINE SULFATE 2 MG: 2 INJECTION, SOLUTION INTRAMUSCULAR; INTRAVENOUS at 08:47

## 2021-05-11 RX ADMIN — ALPRAZOLAM 0.5 MG: 0.25 TABLET ORAL at 01:12

## 2021-05-11 RX ADMIN — FAMOTIDINE 20 MG: 20 TABLET ORAL at 08:30

## 2021-05-11 RX ADMIN — SODIUM CHLORIDE, PRESERVATIVE FREE 10 ML: 5 INJECTION INTRAVENOUS at 20:12

## 2021-05-11 ASSESSMENT — PAIN SCALES - GENERAL
PAINLEVEL_OUTOF10: 0
PAINLEVEL_OUTOF10: 0
PAINLEVEL_OUTOF10: 3
PAINLEVEL_OUTOF10: 5
PAINLEVEL_OUTOF10: 7

## 2021-05-11 ASSESSMENT — PAIN DESCRIPTION - PAIN TYPE: TYPE: ACUTE PAIN

## 2021-05-11 ASSESSMENT — PAIN DESCRIPTION - PROGRESSION: CLINICAL_PROGRESSION: GRADUALLY IMPROVING

## 2021-05-11 ASSESSMENT — PAIN DESCRIPTION - FREQUENCY: FREQUENCY: INTERMITTENT

## 2021-05-11 ASSESSMENT — PAIN DESCRIPTION - LOCATION: LOCATION: PENIS;OTHER (COMMENT)

## 2021-05-11 ASSESSMENT — PAIN DESCRIPTION - DESCRIPTORS: DESCRIPTORS: ACHING;CRAMPING

## 2021-05-11 ASSESSMENT — PAIN DESCRIPTION - ORIENTATION: ORIENTATION: MID

## 2021-05-11 NOTE — PROGRESS NOTES
Transitions of Care Pharmacy Service   Medication Review    The patient's list of current home medications has been reviewed. Source(s) of information: Patient    Based on information provided by the above source(s), I have updated the patient's home med list as described below. Please review the ACTION REQUESTED section of this note below for any discrepancies on current hospital orders. I changed or updated the following medications on the patient's home medication list:  Removed Ziac 2.5/6.25 1 daily - list clean up  Xanax 0.5mg tid prn - list clean up     Added none     Adjusted   none   Other Notes Pt only taking OTC medications. PROVIDER ACTION REQUESTED  Medications that need to be addressed by a physician/nurse practitioner:    Medication Action Requested   Xanax     Please dc as appropriate , not a home med. Please feel free to call me with any questions about this encounter. Thank you. John Brandon, [address] [place] [address]   Transitions of Care Pharmacy Service  Phone:  [phone]  Fax: [phone]      Electronically signed by John Brandon [address] [place] [address] on 5/11/2021 at 11:03 AM           Not in a hospital admission.

## 2021-05-11 NOTE — ED NOTES
Pt not having very much output. Pt monge manually irrigated. multiple clots noted. Draining well again.       Jamie Bauer, RN  05/11/21 5963

## 2021-05-11 NOTE — ED NOTES
Brief filled with blood. Brief changed. Pt resting comfortably.       Muna Hidalgo RN  05/11/21 4219

## 2021-05-11 NOTE — ED NOTES
Pt having bladder spasms. Pt covered in urine. Pt brief changed, bedding changed.       Trisha Pinedo RN  05/11/21 3273

## 2021-05-11 NOTE — ED NOTES
Writer told pt he is to be NPO per the admitting dr. Delphine Son states he does not want any procedures and that he will eat.       Lorraine Husain RN  05/10/21 2030

## 2021-05-11 NOTE — PROGRESS NOTES
Physical Therapy      DATE: 2021    NAME: Daquan Watts  MRN: [id number]   : 1955      Patient not seen this date for Physical Therapy due to:      Bladder flushing    Electronically signed by Regina Jones PT on 2021 at 5:46 PM

## 2021-05-11 NOTE — PROGRESS NOTES
[facility]  Office: [address], DO, Yajaira Misael, DO, Juan Galdamez, DO, Sandi Jc Blood, DO, Madiha Olguin MD, Yessica Yanez MD, Reese Padilla MD, Deedee Brunner, MD, Tracey Horne MD, Makenzie Silver MD, Duran Landis MD, Minal White MD, Lesa Davis DO, Kimmie Rodrigues MD, Mima Esteban, DO, Mark Ramos MD,  Vinny Reyes DO, Ramon Soliz MD, Krystal Moses MD, Woo Muhammad MD, hCrist Ba MD, Carla Bateman, [facility], [address], [facility], [facility], [facility], Lindsay Ortiz, CNS, Isaias Torres, CNP, Nola Phalen, CNP, Carlos Verduzco, CNP, Fuad Guillen, CNP, Binh Grewal, CNP, MORGAN HectorC, Min Gayle, PAU, Danielle Velarde, CNP, Derrick Alvarez, CNP, Suzanne Sepulveda, CNP, Nahomy Medeiros, CNP, Joseph Curran, CNP, Lianne Delgadoha, [facility]    Progress Note    5/11/2021    5:03 PM    Name:   Fina Martinez  MRN:     [id number]     [place]:      [de-identified]   Room:   1001/1001-02   Day:  1  Admit Date:  5/10/2021  3:02 PM    PCP:   No primary care provider on file. Code Status:  Full Code    Subjective:     C/C:   Chief Complaint   Patient presents with    Hematuria     onset this am, sent from urgent care     Interval History Status: not changed. Patient is resting comfortably awaiting bed placement. Denies any chest pain, shortness of breath, nausea or vomiting, fevers or chills. Paige catheter in place with continued gross hematuria. Brief History: This is a 61-year-old male that presents with new onset of gross hematuria that started in the morning of May 10. Initially presented to an urgent care and was sent to the emergency room for evaluation. Is found to have bilateral hydronephrosis as well as bladder outlet obstruction mostly related to prostatic hypertrophy. His evidence of acute kidney injury versus chronic kidney disease at presentation.   Creatinine has continued to rise and is consistent with acute kidney injury. Review of Systems:     Constitutional:  negative for chills, fevers, sweats  Respiratory:  negative for cough, dyspnea on exertion, shortness of breath, wheezing  Cardiovascular:  negative for chest pain, chest pressure/discomfort, lower extremity edema, palpitations  Gastrointestinal:  negative for abdominal pain, constipation, diarrhea, nausea, vomiting  Neurological:  negative for dizziness, headache    Medications: Allergies:  No Known Allergies    Current Meds:   Scheduled Meds:    nicotine  1 patch Transdermal Daily    sodium chloride flush  5-40 mL Intravenous 2 times per day    famotidine  20 mg Oral Daily    cefTRIAXone (ROCEPHIN) IV  1,000 mg Intravenous Q24H     Continuous Infusions:    sodium chloride      sodium chloride 75 mL/hr at 05/10/21 2007     PRN Meds: oxybutynin, lidocaine, ALPRAZolam, sodium chloride flush, sodium chloride, promethazine **OR** ondansetron, magnesium hydroxide, acetaminophen **OR** acetaminophen, morphine **OR** morphine    Data:     Past Medical History:   has a past medical history of Hypertension. Social History:   reports that he has been smoking cigarettes. He has been smoking about 2.00 packs per day. He has never used smokeless tobacco. He reports that he does not drink alcohol or use drugs. Family History: History reviewed. No pertinent family history. Vitals:  BP (!) 153/89   Pulse 105   Temp 97.7 °F (36.5 °C) (Oral)   Resp 20   Ht 6' (1.829 m)   Wt 152 lb (68.9 kg)   SpO2 99%   BMI 20.61 kg/m²   Temp (24hrs), Av.9 °F (36.6 °C), Min:97.7 °F (36.5 °C), Max:98 °F (36.7 °C)    No results for input(s): POCGLU in the last 72 hours. I/O (24Hr):     Intake/Output Summary (Last 24 hours) at 2021 1703  Last data filed at 2021 1702  Gross per 24 hour   Intake 4000 ml   Output 46524 ml   Net -59076 ml       Labs:  Hematology:  Recent Labs     05/10/21  1526   WBC 10.5   RBC 4.02*   HGB 11.2*   HCT 35.6*   MCV 88.6   MCH 27.9   MCHC 31.5   RDW 13.5      MPV 8.3     Chemistry:  Recent Labs     05/10/21  1526 05/11/21  0540    142   K 3.2* 3.4*    105   CO2 25 22   GLUCOSE 106* 102*   BUN 14 17   CREATININE 1.74* 2.84*   MG  --  1.8   ANIONGAP 11 15   LABGLOM 39* 22*   GFRAA 48* 27*   CALCIUM 9.4 8.9   PSA 43.54*  --      Recent Labs     05/10/21  1526   LABA1C 4.6     ABG:No results found for: POCPH, PHART, PH, POCPCO2, KBS5ZKF, PCO2, POCPO2, PO2ART, PO2, POCHCO3, NQU5IRD, HCO3, NBEA, PBEA, BEART, BE, THGBART, THB, FUG1XCQ, QDHX3WIP, Y3RTAHNA, O2SAT, FIO2  Lab Results   Component Value Date/Time    SPECIAL RT [facility] 05/10/2021 06:10 PM     Lab Results   Component Value Date/Time    CULTURE NO GROWTH 9 HOURS 05/10/2021 06:10 PM       Radiology:  Ct Abdomen Pelvis Wo Contrast Additional Contrast? None    Result Date: 5/10/2021  1. Findings compatible with bladder outlet obstruction, likely secondary to prostatomegaly, with associated bilateral moderately severe hydroureteronephrosis. Underlying cystitis is also likely present. 2.  3 mm layering nonobstructing stone in the distal left ureter. Bilateral nephrolithiasis is also present.        Physical Examination:        General appearance:  alert, cooperative and no distress  Mental Status:  oriented to person, place and time and normal affect  Lungs:  clear to auscultation bilaterally, normal effort  Heart:  regular rate and rhythm, no murmur  Abdomen:  soft, nontender, nondistended, normal bowel sounds, no masses, hepatomegaly, splenomegaly  Extremities:  no edema, redness, tenderness in the calves  Skin:  no gross lesions, rashes, induration    Assessment:        Hospital Problems           Last Modified POA    * (Principal) Bilateral hydronephrosis due to bladder outlet obstruction 5/11/2021 Yes    Gross hematuria 5/11/2021 Yes    Heavy cigarette smoker (20-39 per day) 5/10/2021 Yes    Family history of stomach cancer 5/10/2021 Yes Family history of Alzheimer's disease 5/10/2021 Yes    Anxiety about health 5/10/2021 Yes    Elevated PSA 5/11/2021 Yes    SANDEEP (acute kidney injury) ([address] 5/11/2021 Yes    BPH with obstruction/lower urinary tract symptoms 5/11/2021 Yes          Plan:        Continue IV hydration, increase fluid rate to 125 mL/h  Avoid nephrotoxic agents  Urology consultation  Continue present bags pending urine culture  GI and DVT prophylaxis, no chemical prophylaxis due to acute bleeding  Tobacco cessation  May eventually require cystoscopy to evaluate for etiology of bleeding.   Bleeding likely related to prostate hypertrophy, rule out prostate cancer  Correct electrolytes as needed  Discussed with family at bedside    Malathi Jauregui DO  5/11/2021  5:03 PM

## 2021-05-11 NOTE — PLAN OF CARE
Problem: Falls - Risk of:  Goal: Will remain free from falls  Description: Will remain free from falls  Outcome: Ongoing  Note: Siderails up x 2  Hourly rounding  Call light in reach  Instructed to call for assist before attempting out of bed. Remains free from falls and accidental injury at this time   Floor free from obstacles  Bed is locked and in lowest position  Adequate lighting provided  Bed alarm on, Red [place] star and Stay with Me signs posted      Goal: Absence of physical injury  Description: Absence of physical injury  Outcome: Ongoing     Problem: SAFETY  Goal: Free from accidental physical injury  Outcome: Ongoing  Goal: Free from intentional harm  Outcome: Ongoing     Problem: DAILY CARE  Goal: Daily care needs are met  Outcome: Ongoing     Problem: PAIN  Goal: Patient's pain/discomfort is manageable  Outcome: Ongoing     Problem: SKIN INTEGRITY  Goal: Skin integrity is maintained or improved  Outcome: Ongoing     Problem: KNOWLEDGE DEFICIT  Goal: Patient/S.O. demonstrates understanding of disease process, treatment plan, medications, and discharge instructions.   Outcome: Ongoing     Problem: DISCHARGE BARRIERS  Goal: Patient's continuum of care needs are met  Outcome: Ongoing

## 2021-05-11 NOTE — CONSULTS
[facility]  Urology Consultation    Patient:  Suri Otero  MRN: [id number]  YOB: 1955    CHIEF COMPLAINT: Gross hematuria    HISTORY OF PRESENT ILLNESS:   The patient is a 77 y.o. male who presents with gross hematuria of recent onset, patient has been on Macrobid and Bactrim patient has not followed up with a primary care physician for quite some time he has a history of heavy smoking. CT imaging demonstrated bilateral hydroureteronephrosis and reflux obstructive uropathy as well as an enlarged prostate. A 3 mm nonobstructing stone noted as well as bilateral nephrolithiasis    Patient's old records, notes and chart reviewed and summarized above. Past Medical History:    Past Medical History:   Diagnosis Date    Hypertension        Past Surgical History:    Past Surgical History:   Procedure Laterality Date    BACK SURGERY      HERNIA REPAIR      TONSILLECTOMY       Previous  surgery: none     Medications:    Scheduled Meds:   nicotine  1 patch Transdermal Daily    sodium chloride flush  5-40 mL Intravenous 2 times per day    famotidine  20 mg Oral Daily    cefTRIAXone (ROCEPHIN) IV  1,000 mg Intravenous Q24H     Continuous Infusions:   sodium chloride      sodium chloride 75 mL/hr at 05/10/21 2007     PRN Meds:.lidocaine, ALPRAZolam, sodium chloride flush, sodium chloride, promethazine **OR** ondansetron, magnesium hydroxide, acetaminophen **OR** acetaminophen, morphine **OR** morphine    Allergies:  Patient has no known allergies.     Social History:    Social History     Socioeconomic History    Marital status:      Spouse name: Not on file    Number of children: Not on file    Years of education: Not on file    Highest education level: Not on file   Occupational History    Not on file   Social Needs    Financial resource strain: Not on file    Food insecurity     Worry: Not on file     Inability: Not on file    Transportation needs     Medical: Not on file     Non-medical: Not on file   Tobacco Use    Smoking status: Current Every Day Smoker     Packs/day: 2.00     Types: Cigarettes    Smokeless tobacco: Never Used   Substance and Sexual Activity    Alcohol use: Never     Frequency: Never    Drug use: Never    Sexual activity: Not on file   Lifestyle    Physical activity     Days per week: Not on file     Minutes per session: Not on file    Stress: Not on file   Relationships    Social connections     Talks on phone: Not on file     Gets together: Not on file     Attends Restorationist service: Not on file     Active member of club or organization: Not on file     Attends meetings of clubs or organizations: Not on file     Relationship status: Not on file    Intimate partner violence     Fear of current or ex partner: Not on file     Emotionally abused: Not on file     Physically abused: Not on file     Forced sexual activity: Not on file   Other Topics Concern    Not on file   Social History Narrative    Not on file       Family History:  History reviewed. No pertinent family history. Previous Urologic Family history: none    REVIEW OF SYSTEMS:  Constitutional: negative  Eyes: negative  Respiratory: negative  Cardiovascular: negative  Gastrointestinal: negative  Genitourinary: see HPI  Musculoskeletal: negative  Skin: negative   Neurological: negative  Hematological/Lymphatic: negative  Psychological: negative    Physical Exam:    This a 77 y.o. male   Patient Vitals for the past 24 hrs:   BP Temp Pulse Resp SpO2 Height Weight   05/11/21 0121 (!) 186/101 -- 110 -- 100 % -- --   05/10/21 1447 (!) 183/99 98 °F (36.7 °C) 113 16 100 % 6' (1.829 m) 152 lb (68.9 kg)     Constitutional: Patient in no acute distress; Neuro: alert and oriented to person place and time.     Psych: Mood and affect normal.  Skin: Normal  Lungs: Respiratory effort normal  Cardiovascular:  Normal peripheral pulses  Abdomen: Soft, non-tender, non-distended with no CVA, flank pain, hepatosplenomegaly or hernia. Kidneys normal.  Bladder tender to palpation, decompressed with Paige, patient still passing clots, we will start patient on oxybutynin. Lymphatics: no palpable lymphadenopathy  Penis normal and circumcised  Urethral meatus normal catheter in place  Scrotal exam normal  Testicles normal bilaterally  Epididymis normal bilaterally  No evidence of inguinal hernia     LABS:  Recent Labs     05/10/21  1526   WBC 10.5   HGB 11.2*   HCT 35.6*   MCV 88.6        Recent Labs     05/10/21  1526      K 3.2*      CO2 25   BUN 14   CREATININE 1.74*     Lab Results   Component Value Date    PSA 43.54 (H) 05/10/2021       Additional Lab/culture results:    Urinalysis:   Recent Labs     05/10/21  1530   COLORU RED*   PHUR 7.0   WBCUA 0 TO 2   RBCUA TOO NUMEROUS TO COUNT   MUCUS NOT REPORTED   TRICHOMONAS NOT REPORTED   YEAST NOT REPORTED   BACTERIA NOT REPORTED   SPECGRAV 1.020   LEUKOCYTESUR MODERATE*   UROBILINOGEN ELEVATED*   BILIRUBINUR NEGATIVE        -----------------------------------------------------------------  Imaging Results:  .  Findings compatible with bladder outlet obstruction, likely secondary to   prostatomegaly, with associated bilateral moderately severe   hydroureteronephrosis.  Underlying cystitis is also likely present.       2.  3 mm layering nonobstructing stone in the distal left ureter.  Bilateral   nephrolithiasis is also present         Assessment and Plan   Impression: Gross hematuria,   markedly elevated serum PSA  Bilateral hydronephrosis  Acute kidney injury  Patient Active Problem List   Diagnosis    Gross hematuria    Heavy cigarette smoker (20-39 per day)    Family history of stomach cancer    Family history of Alzheimer's disease    Anxiety about health       Plan:  We will continue bladder irrigation until clear, we will repeat CT imaging after hematuria has cleared and proceed with cystoscopy retrograde pyelogram in a.m. after CT helen Bañuelos  4:06 AM 5/11/2021

## 2021-05-11 NOTE — ED PROVIDER NOTES
[address] [place] [address] ED  eMERGENCY dEPARTMENT eNCOUnter      Pt Name: Luann Evans  MRN: [id number]  [place] 1955  Date of evaluation: 5/10/2021  Provider: Demetrius Hamilton NP, FARHAD Kor[address] [id number]       Chief Complaint   Patient presents with    Hematuria     onset this am, sent from urgent care         HISTORY OF PRESENT ILLNESS  (Location/Symptom, Timing/Onset, Context/Setting, Quality, Duration, Modifying Factors, Severity.)   Luann Evans is a 77 y.o. male who presents to the emergency department private vehicle for evaluation of hematuria. Patient states that since 2:00 this morning he has had bright red blood in his urine. He states he is also had some mild running with urination. He denies any associated fevers or chills. The daughter reports that he had a UTI roughly a month and a half ago and was treated with Bactrim. He states that he has not seen a doctor in over 15 years. Denies any cough or shortness of breath. Nursing Notes were reviewed. ALLERGIES     Patient has no known allergies. CURRENT MEDICATIONS       Previous Medications    ALPRAZOLAM (XANAX) 0.5 MG TABLET    Take 0.5 mg by mouth 3 times daily as needed for Sleep. Take 1 tab TID PRN for Anxiety    BISOPROLOL-HYDROCHLOROTHIAZIDE (ZIAC) 2.5-6.25 MG PER TABLET    Take 1 tablet by mouth daily Take 1 PO QD    CRANBERRY PO    Take by mouth daily 3 tabs daily    NONFORMULARY    superbeta prostate BID    SENNOSIDES-DOCUSATE SODIUM (SENOKOT-S) 8.6-50 MG TABLET    Take 1 tablet by mouth daily       PAST MEDICAL HISTORY         Diagnosis Date    Hypertension        SURGICAL HISTORY           Procedure Laterality Date    BACK SURGERY      HERNIA REPAIR      TONSILLECTOMY           FAMILY HISTORY     History reviewed. No pertinent family history. No family status information on file. SOCIAL HISTORY      reports that he has been smoking cigarettes. He has been smoking about 2.00 packs per day.  He has never used and dry. Findings: No rash. Neurological:      Mental Status: He is alert and oriented to person, place, and time. DIAGNOSTIC RESULTS         RADIOLOGY:   Non-plain film images such as CT, Ultrasound and MRI are read by the radiologist. Wanda Renee radiographic images are visualized and preliminarily interpreted by the emergency physician with the below findings:    Ct Abdomen Pelvis Wo Contrast Additional Contrast? None    Result Date: 5/10/2021  EXAMINATION: CT OF THE ABDOMEN AND PELVIS WITHOUT CONTRAST 5/10/2021 3:34 pm TECHNIQUE: CT of the abdomen and pelvis was performed without the administration of intravenous contrast. Multiplanar reformatted images are provided for review. Dose modulation, iterative reconstruction, and/or weight based adjustment of the mA/kV was utilized to reduce the radiation dose to as low as reasonably achievable. COMPARISON: None. HISTORY: ORDERING SYSTEM PROVIDED HISTORY: hematuria TECHNOLOGIST PROVIDED HISTORY: hematuria Decision Support Exception - unselect if not a suspected or confirmed emergency medical condition->Emergency Medical Condition (MA) Reason for Exam: Blood in urine, dysuria, starting today. H/O hernia repair Acuity: Acute Type of Exam: Initial FINDINGS: LOWER CHEST: No focal abnormality. KIDNEYS AND URINARY TRACT: Bilateral moderately severe hydronephrosis and hydroureter to the level of the bladder is noted. A small layering stone measuring 3 mm in the distal left ureter is noted. Bilateral nephrolithiasis is noted with small curvilinear stones. Distended bladder with diffuse bladder wall thickening and mild induration of the adjacent fat. ORGANS: Lack of intravenous contrast limits evaluation of the solid organs and bowel. The liver, gallbladder, pancreas, spleen, and adrenals reveal no acute abnormality. GI/BOWEL: No bowel dilatation or wall thickening. PELVIS: Bladder findings, as above. Prostatomegaly is noted.   The penile urethra appears dilated. PERITONEUM/RETROPERITONEUM: No lymphadenopathy is noted. No free air or free fluid. The aorta is normal in caliber. BONES/SOFT TISSUES: No acute osseous abnormality is identified. 1.  Findings compatible with bladder outlet obstruction, likely secondary to prostatomegaly, with associated bilateral moderately severe hydroureteronephrosis. Underlying cystitis is also likely present. 2.  3 mm layering nonobstructing stone in the distal left ureter. Bilateral nephrolithiasis is also present. Interpretation per the Radiologist below, if available at the time of this note:    CT ABDOMEN PELVIS WO CONTRAST Additional Contrast? None   Final Result   1. Findings compatible with bladder outlet obstruction, likely secondary to   prostatomegaly, with associated bilateral moderately severe   hydroureteronephrosis. Underlying cystitis is also likely present. 2.  3 mm layering nonobstructing stone in the distal left ureter. Bilateral   nephrolithiasis is also present.                  LABS:  Labs Reviewed   URINE RT REFLEX TO CULTURE - Abnormal; Notable for the following components:       Result Value    Color, UA RED (*)     Turbidity UA CLOUDY (*)     Ketones, Urine 1+ (*)     Urine Hgb 3+ (*)     Protein, UA 3+ (*)     Urobilinogen, Urine ELEVATED (*)     Nitrite, Urine POSITIVE (*)     Leukocyte Esterase, Urine MODERATE (*)     All other components within normal limits   CBC WITH AUTO DIFFERENTIAL - Abnormal; Notable for the following components:    RBC 4.02 (*)     Hemoglobin 11.2 (*)     Hematocrit 35.6 (*)     Seg Neutrophils 67 (*)     All other components within normal limits   BASIC METABOLIC PANEL - Abnormal; Notable for the following components:    Glucose 106 (*)     CREATININE 1.74 (*)     Bun/Cre Ratio 8 (*)     Potassium 3.2 (*)     GFR Non- 39 (*)     GFR  48 (*)     All other components within normal limits   MICROSCOPIC URINALYSIS - Abnormal; Notable for the following components:    Other Observations UA   (*)     Value: INTERPRET WITH CAUTION DUE TO INTENSE COLOR OF URINE. All other components within normal limits   CULTURE, BLOOD 1   CULTURE, BLOOD 1   BASIC METABOLIC PANEL W/ REFLEX TO MG FOR LOW K   HEMOGLOBIN A1C   PSA, DIAGNOSTIC       All other labs were within normal range or not returned as of this dictation. EMERGENCY DEPARTMENT COURSE and DIFFERENTIAL DIAGNOSIS/MDM:   Vitals:    Vitals:    05/10/21 1447   BP: (!) 183/99   Pulse: 113   Resp: 16   Temp: 98 °F (36.7 °C)   SpO2: 100%   Weight: 152 lb (68.9 kg)   Height: 6' (1.829 m)       Medical Decision Making: Discussed this case and findings with Dr. Brynn Valencia. He was made aware of all findings. I discussed this case with . Internal medicine. 3 way monge catheter was placed with bladder irrigation. CONSULTS:  IP CONSULT TO UROLOGY  IP CONSULT TO SOCIAL WORK      FINAL IMPRESSION      1. Urinary retention    2. Hematuria, unspecified type          DISPOSITION/PLAN   DISPOSITION Admitted 05/10/2021 07:30:13 PM      PATIENT REFERRED TO:   No follow-up provider specified.     DISCHARGE MEDICATIONS:     New Prescriptions    No medications on file           (Please note that portions of this note were completed with a voice recognition program.  Efforts were made to edit the dictations but occasionally words are mis-transcribed.)    Jennifer Salgado NP, FARHAD Lazaro CNP  Certified Nurse Practitioner          FARHAD Irvin CNP  05/10/21 1903

## 2021-05-11 NOTE — ED NOTES
500 mL output. Irrigation continued. Pt states the spasms are still occurring.       Evan Spencer, RN  05/11/21 6394

## 2021-05-11 NOTE — ED NOTES
Pt bleeding around the catheter. Pt balloon catheter increased to 20mL. Pt cleaned new brief applied. Irrigation paused.       Jaime Bauer, RN  05/10/21 213       Jamie Bauer, RN  05/10/21 1783

## 2021-05-12 ENCOUNTER — APPOINTMENT (OUTPATIENT)
Dept: GENERAL RADIOLOGY | Age: 66
DRG: 689 | End: 2021-05-12
Payer: MEDICARE

## 2021-05-12 ENCOUNTER — ANESTHESIA EVENT (OUTPATIENT)
Dept: OPERATING ROOM | Age: 66
DRG: 689 | End: 2021-05-12
Payer: MEDICARE

## 2021-05-12 ENCOUNTER — ANESTHESIA (OUTPATIENT)
Dept: OPERATING ROOM | Age: 66
DRG: 689 | End: 2021-05-12
Payer: MEDICARE

## 2021-05-12 ENCOUNTER — APPOINTMENT (OUTPATIENT)
Dept: CT IMAGING | Age: 66
DRG: 689 | End: 2021-05-12
Payer: MEDICARE

## 2021-05-12 VITALS — TEMPERATURE: 92.1 F | DIASTOLIC BLOOD PRESSURE: 51 MMHG | SYSTOLIC BLOOD PRESSURE: 94 MMHG | OXYGEN SATURATION: 100 %

## 2021-05-12 PROBLEM — E43 SEVERE MALNUTRITION (HCC): Status: ACTIVE | Noted: 2021-05-12

## 2021-05-12 LAB
-: ABNORMAL
ABSOLUTE EOS #: 0.34 K/UL (ref 0–0.44)
ABSOLUTE IMMATURE GRANULOCYTE: 0.04 K/UL (ref 0–0.3)
ABSOLUTE LYMPH #: 3.24 K/UL (ref 1.1–3.7)
ABSOLUTE MONO #: 0.67 K/UL (ref 0.1–1.2)
AMORPHOUS: ABNORMAL
ANION GAP SERPL CALCULATED.3IONS-SCNC: 12 MMOL/L (ref 9–17)
BACTERIA: ABNORMAL
BASOPHILS # BLD: 1 % (ref 0–2)
BASOPHILS ABSOLUTE: 0.06 K/UL (ref 0–0.2)
BILIRUBIN URINE: NEGATIVE
BUN BLDV-MCNC: 16 MG/DL (ref 8–23)
BUN/CREAT BLD: 7 (ref 9–20)
CALCIUM SERPL-MCNC: 8.5 MG/DL (ref 8.6–10.4)
CASTS UA: ABNORMAL /LPF
CHLORIDE BLD-SCNC: 106 MMOL/L (ref 98–107)
CO2: 23 MMOL/L (ref 20–31)
COLOR: ABNORMAL
COMMENT UA: ABNORMAL
CREAT SERPL-MCNC: 2.37 MG/DL (ref 0.7–1.2)
CRYSTALS, UA: ABNORMAL /HPF
DIFFERENTIAL TYPE: ABNORMAL
EOSINOPHILS RELATIVE PERCENT: 3 % (ref 1–4)
EPITHELIAL CELLS UA: ABNORMAL /HPF (ref 0–5)
GFR AFRICAN AMERICAN: 33 ML/MIN
GFR NON-AFRICAN AMERICAN: 28 ML/MIN
GFR SERPL CREATININE-BSD FRML MDRD: ABNORMAL ML/MIN/{1.73_M2}
GFR SERPL CREATININE-BSD FRML MDRD: ABNORMAL ML/MIN/{1.73_M2}
GLUCOSE BLD-MCNC: 103 MG/DL (ref 70–99)
GLUCOSE URINE: NEGATIVE
HCT VFR BLD CALC: 29.4 % (ref 40.7–50.3)
HEMOGLOBIN: 9.1 G/DL (ref 13–17)
IMMATURE GRANULOCYTES: 0 %
KETONES, URINE: ABNORMAL
LEUKOCYTE ESTERASE, URINE: ABNORMAL
LYMPHOCYTES # BLD: 31 % (ref 24–43)
MAGNESIUM: 1.7 MG/DL (ref 1.6–2.6)
MCH RBC QN AUTO: 28 PG (ref 25.2–33.5)
MCHC RBC AUTO-ENTMCNC: 31 G/DL (ref 28.4–34.8)
MCV RBC AUTO: 90.5 FL (ref 82.6–102.9)
MONOCYTES # BLD: 6 % (ref 3–12)
MUCUS: ABNORMAL
NITRITE, URINE: POSITIVE
NRBC AUTOMATED: 0 PER 100 WBC
OTHER OBSERVATIONS UA: ABNORMAL
PDW BLD-RTO: 13.4 % (ref 11.8–14.4)
PH UA: 7 (ref 5–8)
PLATELET # BLD: 247 K/UL (ref 138–453)
PLATELET ESTIMATE: ABNORMAL
PMV BLD AUTO: 8.2 FL (ref 8.1–13.5)
POTASSIUM SERPL-SCNC: 3.4 MMOL/L (ref 3.7–5.3)
PROTEIN UA: ABNORMAL
RBC # BLD: 3.25 M/UL (ref 4.21–5.77)
RBC # BLD: ABNORMAL 10*6/UL
RBC UA: ABNORMAL /HPF (ref 0–2)
RENAL EPITHELIAL, UA: ABNORMAL /HPF
SARS-COV-2, RAPID: NOT DETECTED
SEG NEUTROPHILS: 59 % (ref 36–65)
SEGMENTED NEUTROPHILS ABSOLUTE COUNT: 6.13 K/UL (ref 1.5–8.1)
SODIUM BLD-SCNC: 141 MMOL/L (ref 135–144)
SPECIFIC GRAVITY UA: 1.02 (ref 1–1.03)
SPECIMEN DESCRIPTION: NORMAL
TRICHOMONAS: ABNORMAL
TURBIDITY: ABNORMAL
URINE HGB: ABNORMAL
UROBILINOGEN, URINE: ABNORMAL
WBC # BLD: 10.5 K/UL (ref 3.5–11.3)
WBC # BLD: ABNORMAL 10*3/UL
WBC UA: ABNORMAL /HPF (ref 0–5)
YEAST: ABNORMAL

## 2021-05-12 PROCEDURE — 2709999900 HC NON-CHARGEABLE SUPPLY: Performed by: UROLOGY

## 2021-05-12 PROCEDURE — 97535 SELF CARE MNGMENT TRAINING: CPT

## 2021-05-12 PROCEDURE — 36415 COLL VENOUS BLD VENIPUNCTURE: CPT

## 2021-05-12 PROCEDURE — 88304 TISSUE EXAM BY PATHOLOGIST: CPT

## 2021-05-12 PROCEDURE — 6370000000 HC RX 637 (ALT 250 FOR IP): Performed by: UROLOGY

## 2021-05-12 PROCEDURE — 99232 SBSQ HOSP IP/OBS MODERATE 35: CPT | Performed by: INTERNAL MEDICINE

## 2021-05-12 PROCEDURE — 87635 SARS-COV-2 COVID-19 AMP PRB: CPT

## 2021-05-12 PROCEDURE — 2580000003 HC RX 258: Performed by: UROLOGY

## 2021-05-12 PROCEDURE — 81001 URINALYSIS AUTO W/SCOPE: CPT

## 2021-05-12 PROCEDURE — 7100000001 HC PACU RECOVERY - ADDTL 15 MIN: Performed by: UROLOGY

## 2021-05-12 PROCEDURE — 6360000002 HC RX W HCPCS: Performed by: NURSE PRACTITIONER

## 2021-05-12 PROCEDURE — 97166 OT EVAL MOD COMPLEX 45 MIN: CPT

## 2021-05-12 PROCEDURE — 2060000000 HC ICU INTERMEDIATE R&B

## 2021-05-12 PROCEDURE — BT141ZZ FLUOROSCOPY OF KIDNEYS, URETERS AND BLADDER USING LOW OSMOLAR CONTRAST: ICD-10-PCS | Performed by: UROLOGY

## 2021-05-12 PROCEDURE — 85025 COMPLETE CBC W/AUTO DIFF WBC: CPT

## 2021-05-12 PROCEDURE — 3700000000 HC ANESTHESIA ATTENDED CARE: Performed by: UROLOGY

## 2021-05-12 PROCEDURE — 2580000003 HC RX 258: Performed by: NURSE ANESTHETIST, CERTIFIED REGISTERED

## 2021-05-12 PROCEDURE — 97530 THERAPEUTIC ACTIVITIES: CPT

## 2021-05-12 PROCEDURE — 0DCB8ZZ EXTIRPATION OF MATTER FROM ILEUM, VIA NATURAL OR ARTIFICIAL OPENING ENDOSCOPIC: ICD-10-PCS | Performed by: UROLOGY

## 2021-05-12 PROCEDURE — 2500000003 HC RX 250 WO HCPCS

## 2021-05-12 PROCEDURE — C1758 CATHETER, URETERAL: HCPCS | Performed by: UROLOGY

## 2021-05-12 PROCEDURE — 6370000000 HC RX 637 (ALT 250 FOR IP): Performed by: STUDENT IN AN ORGANIZED HEALTH CARE EDUCATION/TRAINING PROGRAM

## 2021-05-12 PROCEDURE — 80048 BASIC METABOLIC PNL TOTAL CA: CPT

## 2021-05-12 PROCEDURE — 3700000001 HC ADD 15 MINUTES (ANESTHESIA): Performed by: UROLOGY

## 2021-05-12 PROCEDURE — 3209999900 FLUORO FOR SURGICAL PROCEDURES

## 2021-05-12 PROCEDURE — 3600000002 HC SURGERY LEVEL 2 BASE: Performed by: UROLOGY

## 2021-05-12 PROCEDURE — 2500000003 HC RX 250 WO HCPCS: Performed by: NURSE ANESTHETIST, CERTIFIED REGISTERED

## 2021-05-12 PROCEDURE — 2580000003 HC RX 258: Performed by: INTERNAL MEDICINE

## 2021-05-12 PROCEDURE — 51700 IRRIGATION OF BLADDER: CPT

## 2021-05-12 PROCEDURE — 74176 CT ABD & PELVIS W/O CONTRAST: CPT

## 2021-05-12 PROCEDURE — 88120 CYTP URNE 3-5 PROBES EA SPEC: CPT

## 2021-05-12 PROCEDURE — 83735 ASSAY OF MAGNESIUM: CPT

## 2021-05-12 PROCEDURE — 6360000002 HC RX W HCPCS: Performed by: NURSE ANESTHETIST, CERTIFIED REGISTERED

## 2021-05-12 PROCEDURE — 87086 URINE CULTURE/COLONY COUNT: CPT

## 2021-05-12 PROCEDURE — 3600000012 HC SURGERY LEVEL 2 ADDTL 15MIN: Performed by: UROLOGY

## 2021-05-12 PROCEDURE — 7100000000 HC PACU RECOVERY - FIRST 15 MIN: Performed by: UROLOGY

## 2021-05-12 RX ORDER — FENTANYL CITRATE 50 UG/ML
25 INJECTION, SOLUTION INTRAMUSCULAR; INTRAVENOUS EVERY 5 MIN PRN
Status: DISCONTINUED | OUTPATIENT
Start: 2021-05-12 | End: 2021-05-12 | Stop reason: HOSPADM

## 2021-05-12 RX ORDER — LABETALOL HYDROCHLORIDE 5 MG/ML
INJECTION, SOLUTION INTRAVENOUS
Status: COMPLETED
Start: 2021-05-12 | End: 2021-05-12

## 2021-05-12 RX ORDER — FENTANYL CITRATE 50 UG/ML
INJECTION, SOLUTION INTRAMUSCULAR; INTRAVENOUS PRN
Status: DISCONTINUED | OUTPATIENT
Start: 2021-05-12 | End: 2021-05-12 | Stop reason: SDUPTHER

## 2021-05-12 RX ORDER — MAGNESIUM HYDROXIDE 1200 MG/15ML
LIQUID ORAL CONTINUOUS PRN
Status: COMPLETED | OUTPATIENT
Start: 2021-05-12 | End: 2021-05-12

## 2021-05-12 RX ORDER — POTASSIUM CHLORIDE 7.45 MG/ML
10 INJECTION INTRAVENOUS
Status: COMPLETED | OUTPATIENT
Start: 2021-05-12 | End: 2021-05-12

## 2021-05-12 RX ORDER — FENTANYL CITRATE 50 UG/ML
50 INJECTION, SOLUTION INTRAMUSCULAR; INTRAVENOUS EVERY 5 MIN PRN
Status: DISCONTINUED | OUTPATIENT
Start: 2021-05-12 | End: 2021-05-12 | Stop reason: HOSPADM

## 2021-05-12 RX ORDER — SODIUM CHLORIDE 9 MG/ML
INJECTION, SOLUTION INTRAVENOUS CONTINUOUS PRN
Status: DISCONTINUED | OUTPATIENT
Start: 2021-05-12 | End: 2021-05-12 | Stop reason: SDUPTHER

## 2021-05-12 RX ORDER — LIDOCAINE HYDROCHLORIDE 20 MG/ML
JELLY TOPICAL PRN
Status: DISCONTINUED | OUTPATIENT
Start: 2021-05-12 | End: 2021-05-12 | Stop reason: HOSPADM

## 2021-05-12 RX ORDER — LIDOCAINE HYDROCHLORIDE 20 MG/ML
INJECTION, SOLUTION EPIDURAL; INFILTRATION; INTRACAUDAL; PERINEURAL PRN
Status: DISCONTINUED | OUTPATIENT
Start: 2021-05-12 | End: 2021-05-12 | Stop reason: SDUPTHER

## 2021-05-12 RX ORDER — ONDANSETRON 2 MG/ML
4 INJECTION INTRAMUSCULAR; INTRAVENOUS
Status: DISCONTINUED | OUTPATIENT
Start: 2021-05-12 | End: 2021-05-12 | Stop reason: HOSPADM

## 2021-05-12 RX ORDER — LABETALOL HYDROCHLORIDE 5 MG/ML
10 INJECTION, SOLUTION INTRAVENOUS ONCE
Status: COMPLETED | OUTPATIENT
Start: 2021-05-12 | End: 2021-05-12

## 2021-05-12 RX ORDER — PROPOFOL 10 MG/ML
INJECTION, EMULSION INTRAVENOUS PRN
Status: DISCONTINUED | OUTPATIENT
Start: 2021-05-12 | End: 2021-05-12 | Stop reason: SDUPTHER

## 2021-05-12 RX ORDER — METOPROLOL TARTRATE 5 MG/5ML
5 INJECTION INTRAVENOUS EVERY 6 HOURS PRN
Status: DISCONTINUED | OUTPATIENT
Start: 2021-05-12 | End: 2021-05-15 | Stop reason: HOSPADM

## 2021-05-12 RX ORDER — CEFTRIAXONE 1 G/1
INJECTION, POWDER, FOR SOLUTION INTRAMUSCULAR; INTRAVENOUS PRN
Status: DISCONTINUED | OUTPATIENT
Start: 2021-05-12 | End: 2021-05-12 | Stop reason: SDUPTHER

## 2021-05-12 RX ADMIN — PHENYLEPHRINE HYDROCHLORIDE 200 MCG: 10 INJECTION INTRAVENOUS at 17:06

## 2021-05-12 RX ADMIN — SODIUM CHLORIDE: 9 INJECTION, SOLUTION INTRAVENOUS at 01:21

## 2021-05-12 RX ADMIN — PHENYLEPHRINE HYDROCHLORIDE 200 MCG: 10 INJECTION INTRAVENOUS at 16:44

## 2021-05-12 RX ADMIN — Medication 50 MCG: at 16:31

## 2021-05-12 RX ADMIN — MORPHINE SULFATE 4 MG: 4 INJECTION, SOLUTION INTRAMUSCULAR; INTRAVENOUS at 01:21

## 2021-05-12 RX ADMIN — MORPHINE SULFATE 4 MG: 4 INJECTION, SOLUTION INTRAMUSCULAR; INTRAVENOUS at 04:41

## 2021-05-12 RX ADMIN — SODIUM CHLORIDE: 9 INJECTION, SOLUTION INTRAVENOUS at 18:34

## 2021-05-12 RX ADMIN — LIDOCAINE HYDROCHLORIDE 60 MG: 20 INJECTION, SOLUTION EPIDURAL; INFILTRATION; INTRACAUDAL; PERINEURAL at 16:34

## 2021-05-12 RX ADMIN — Medication 50 MCG: at 16:34

## 2021-05-12 RX ADMIN — ALPRAZOLAM 0.5 MG: 0.25 TABLET ORAL at 18:34

## 2021-05-12 RX ADMIN — PROPOFOL 140 MG: 10 INJECTION, EMULSION INTRAVENOUS at 16:34

## 2021-05-12 RX ADMIN — PHENYLEPHRINE HYDROCHLORIDE 200 MCG: 10 INJECTION INTRAVENOUS at 16:41

## 2021-05-12 RX ADMIN — PHENYLEPHRINE HYDROCHLORIDE 200 MCG: 10 INJECTION INTRAVENOUS at 16:59

## 2021-05-12 RX ADMIN — POTASSIUM CHLORIDE 10 MEQ: 7.46 INJECTION, SOLUTION INTRAVENOUS at 06:39

## 2021-05-12 RX ADMIN — LABETALOL HYDROCHLORIDE 10 MG: 5 INJECTION INTRAVENOUS at 17:21

## 2021-05-12 RX ADMIN — CEFTRIAXONE SODIUM 1 G: 1 INJECTION, POWDER, FOR SOLUTION INTRAMUSCULAR; INTRAVENOUS at 16:42

## 2021-05-12 RX ADMIN — OXYBUTYNIN CHLORIDE 5 MG: 5 TABLET ORAL at 01:21

## 2021-05-12 RX ADMIN — SODIUM CHLORIDE: 9 INJECTION, SOLUTION INTRAVENOUS at 08:31

## 2021-05-12 RX ADMIN — PHENYLEPHRINE HYDROCHLORIDE 200 MCG: 10 INJECTION INTRAVENOUS at 16:51

## 2021-05-12 RX ADMIN — SODIUM CHLORIDE: 9 INJECTION, SOLUTION INTRAVENOUS at 16:34

## 2021-05-12 RX ADMIN — POTASSIUM CHLORIDE 10 MEQ: 7.46 INJECTION, SOLUTION INTRAVENOUS at 09:43

## 2021-05-12 RX ADMIN — POTASSIUM CHLORIDE 10 MEQ: 7.46 INJECTION, SOLUTION INTRAVENOUS at 08:31

## 2021-05-12 RX ADMIN — POTASSIUM CHLORIDE 10 MEQ: 7.46 INJECTION, SOLUTION INTRAVENOUS at 05:30

## 2021-05-12 RX ADMIN — LABETALOL HYDROCHLORIDE 10 MG: 5 INJECTION, SOLUTION INTRAVENOUS at 17:21

## 2021-05-12 RX ADMIN — MORPHINE SULFATE 2 MG: 2 INJECTION, SOLUTION INTRAMUSCULAR; INTRAVENOUS at 08:42

## 2021-05-12 ASSESSMENT — PAIN SCALES - GENERAL
PAINLEVEL_OUTOF10: 3
PAINLEVEL_OUTOF10: 3
PAINLEVEL_OUTOF10: 7
PAINLEVEL_OUTOF10: 0
PAINLEVEL_OUTOF10: 2
PAINLEVEL_OUTOF10: 0
PAINLEVEL_OUTOF10: 0
PAINLEVEL_OUTOF10: 5

## 2021-05-12 ASSESSMENT — PULMONARY FUNCTION TESTS
PIF_VALUE: 1
PIF_VALUE: 1
PIF_VALUE: 9
PIF_VALUE: 12
PIF_VALUE: 3
PIF_VALUE: 9
PIF_VALUE: 9
PIF_VALUE: 3
PIF_VALUE: 6
PIF_VALUE: 1
PIF_VALUE: 3
PIF_VALUE: 1
PIF_VALUE: 1
PIF_VALUE: 11
PIF_VALUE: 3

## 2021-05-12 ASSESSMENT — ENCOUNTER SYMPTOMS: SHORTNESS OF BREATH: 0

## 2021-05-12 ASSESSMENT — PAIN DESCRIPTION - PROGRESSION
CLINICAL_PROGRESSION: GRADUALLY WORSENING
CLINICAL_PROGRESSION: NOT CHANGED

## 2021-05-12 ASSESSMENT — PAIN DESCRIPTION - LOCATION: LOCATION: PENIS;OTHER (COMMENT)

## 2021-05-12 ASSESSMENT — PAIN - FUNCTIONAL ASSESSMENT: PAIN_FUNCTIONAL_ASSESSMENT: PREVENTS OR INTERFERES SOME ACTIVE ACTIVITIES AND ADLS

## 2021-05-12 NOTE — PROGRESS NOTES
Physical Therapy    DATE: 2021    NAME: Fina Martinez  MRN: [id number]   : 1955      Patient not seen this date for Physical Therapy due to:  Cysto- just left. Will check .        Electronically signed by Bal Lujan PT on 2021 at 5:32 PM

## 2021-05-12 NOTE — PLAN OF CARE
Nutrition Problem #1: Severe malnutrition  Intervention: Food and/or Nutrient Delivery: Continue NPO(Advance diet as tolerated)  Nutritional Goals: Diet advancement with PO intake >50% when diet advances

## 2021-05-12 NOTE — PROGRESS NOTES
Occupational Therapy   Occupational Therapy Initial Assessment  Date: 2021   Patient Name: David Calderon  MRN: [id number]     : 1955    RHEA Siddiqi reports patient is medically stable for therapy treatment this date. Chart reviewed prior to treatment and patient is agreeable for therapy. All lines intact and patient positioned comfortably at end of treatment. All patient needs addressed prior to ending therapy session. Due to recent hospitalization and medical condition, pt would benefit from additional therapy at time of discharge to ensure safety. Please refer to the AM-PAC score for current functional status. Date of Service: 2021    Discharge Recommendations:  Patient would benefit from continued therapy after discharge  OT Equipment Recommendations  Equipment Needed: Yes  Mobility Devices: ADL Assistive Devices; Amie Quijano: Danika  ADL Assistive Devices: Toileting - 3-in-1 Commode;Grab Bars - shower; Reacher;Long-handled Shoe Horn;Long-handled Sponge;Emergency Alert System    Assessment   Performance deficits / Impairments: Decreased functional mobility ; Decreased safe awareness;Decreased balance;Decreased ADL status; Decreased coordination;Decreased endurance;Decreased high-level IADLs;Decreased sensation;Decreased posture;Decreased cognition;Decreased vision/visual deficit  Assessment: Skilled OT is indicated to increase act roney, balance and I/safety in function to return home as able and with assist as needed.   Prognosis: Fair  Decision Making: Medium Complexity  OT Education: OT Role;Plan of Care;Family Education;Transfer Training;Energy Conservation  Patient Education: pursed lip breathing, safety in function, call light use/fall prevention, postural control, recommendation for continued therapy, benefits of being up OOB as able  REQUIRES OT FOLLOW UP: Yes  Activity Tolerance  Activity Tolerance: Patient limited by fatigue;Patient limited by pain  Activity Tolerance: fair minus  Safety Devices  Safety Devices in place: Yes  Type of devices: Bed alarm in place;Call light within reach; Left in bed;Patient at risk for falls;Gait belt;Nurse notified           Patient Diagnosis(es): The primary encounter diagnosis was Urinary retention. A diagnosis of Hematuria, unspecified type was also pertinent to this visit. has a past medical history of Hypertension. has a past surgical history that includes hernia repair; back surgery; and Tonsillectomy. PER H&P: Tabitha Yi is a 77 y.o. Non-/non  male who presents with Hematuria (onset this am, sent from urgent care)   and is admitted to the hospital for the management of <principal problem not specified>.        Restrictions  Restrictions/Precautions  Restrictions/Precautions: Fall Risk  Position Activity Restriction  Other position/activity restrictions: RUE IV, monge, NPO, up as roney and up with assist, alarms    Subjective   General  Chart Reviewed: Yes  Patient assessed for rehabilitation services?: Yes  Family / Caregiver Present: Yes(spouse present)  Patient Currently in Pain: Yes  Pre Treatment Pain Screening  Intervention List: Nurse/Physician notified  Comments / Details: Pt c/o abd pain and rated 5/10.     Social/Functional History  Social/Functional History  Lives With: Spouse(works 8-5)  Type of Home: House  Home Layout: One level(laundry on main)  Home Access: Stairs to enter without rails(garage entrance)  Entrance Stairs - Number of Steps: 1  Bathroom Shower/Tub: Tub/Shower unit  Bathroom Toilet: Standard(vanity close)  Bathroom Equipment: (no DME)  Home Equipment: Quad cane  Receives Help From: Family(pt states he has a supportive family-2 dtr local)  ADL Assistance: Independent  Homemaking Assistance: Needs assistance(spouse assists with IADL's; pt has been completing some laundry tasks)  Homemaking Responsibilities: Yes  Ambulation Assistance: Independent(with quad cane as needed)  Transfer Assistance: Independent  Active : Yes  Occupation: Retired  Type of occupation:   Leisure & Hobbies: work on things; play with dog  Additional Comments: Pt states he fell down onto his knees outside while checking on car. Objective   Vision: Impaired  Vision Exceptions: Wears glasses at all times(pt states he has B eye cataracts R > L)  Hearing: Exceptions to [facility]  Hearing Exceptions: Hard of hearing/hearing concerns    Orientation  Overall Orientation Status: Within Functional Limits  Observation/Palpation  Posture: Fair(with RW)  Observation: saleem GOMES IV, dificulties understanding pt's speech at times due to garbled. Edema: none  Balance  Sitting Balance: Stand by assistance  Standing Balance: Contact guard assistance(with RW)  Standing Balance  Time: stand roney < 1 min with RW for functional tasks  Functional Mobility  Functional - Mobility Device: Rolling Walker  Activity: (bed to door and to bathroom door and back to bed)  Assist Level: Contact guard assistance  Functional Mobility Comments: MOD verbal instruction/tactile assist for upright posture, benefits of RW and recommendations for use when up, RW safety/mgt, scanning room, pacing, weight shifting as well as awareness/assist with lines to increase overall safety/reduce falls.   Toilet Transfers  Toilet Transfers Comments: N/T and pt with monge     ADL  Feeding: Setup  Grooming: Setup;Stand by assistance(seated)  UE Bathing: Setup;Stand by assistance  LE Bathing: Setup;Contact guard assistance  UE Dressing: Setup;Minimal assistance(with hosp gown)  LE Dressing: Setup;Minimal assistance(pt able to leila B socks seated EOB and crossing BLE's)  Toileting: Dependent/Total(saleem)  Tone RUE  RUE Tone: Normotonic  Tone LUE  LUE Tone: Normotonic  Coordination  Movements Are Fluid And Coordinated: No  Coordination and Movement description: Fine motor impairments     Bed mobility  Supine to Sit: Stand by assistance  Sit to Supine: Stand by assistance(RN Re-education, Patient/Caregiver Education & Training, Self-Care / ADL, Equipment Evaluation, Education, & procurement, Home Management Training, Pain Management, Cognitive/Perceptual Training                                                    AM-PAC Score   17          Goals  Short term goals  Time Frame for Short term goals: by discharge, pt to demo  Short term goal 1: bed mob tasks with use of rail as needed to MOD I. Short term goal 2: increase in B hand strength by a 1/2 grade and be I with BUE HEP with use of handouts as needed to maintain strength. Short term goal 3: total body ADL tasks with use of AD/AE and grab bar as needed to SUP. Short term goal 4: ADL transfers and functional mob with AD to SUP level. Short term goal 5: standing to SUP with AD roney > 6 mins as able to reduce falls in function. Long term goals  Long term goal 1: Pt/family to be I with EC/WS and fall prevention tech as well as DME/AD and AE recommendations with use of handouts. Patient Goals   Patient goals : Get me out of here!        Therapy Time   Individual Concurrent Group Co-treatment   Time In 0803(plus 10 min chart review/RN communication)         Time Out 0853         Minutes [address], [place]

## 2021-05-12 NOTE — PLAN OF CARE
Problem: Falls - Risk of:  Goal: Will remain free from falls  Description: Will remain free from falls  Outcome: Ongoing  Note: Patient is a fall risk during this admission. Fall risk assessment was performed. Patient is absent of falls. Bed is in the lowest position. Wheels on the bed are locked. Call light and bed side table are within reach. Clutter is removed. Patient was educated to call out when needing assistance or wanting to get out of bed. Patient offered toileting assistance during rounding. Hourly rounds have been performed. Problem: PAIN  Goal: Patient's pain/discomfort is manageable  Outcome: Ongoing  Note: Pt medicated with pain medication prn. Assessed all pain characteristics including level, type, location, frequency, and onset. Non-pharmacologic interventions offered to pt as well. Pt states pain is tolerable at this time.  Will continue to monitor      Problem: Urinary Elimination:  Goal: Ability to achieve a balanced intake and output will improve  Description: Ability to achieve a balanced intake and output will improve  Outcome: Ongoing     Problem: Urinary Elimination:  Goal: Will remain free from infection  Description: Will remain free from infection  Outcome: Ongoing     Problem: SKIN INTEGRITY  Goal: Skin integrity is maintained or improved  Outcome: Ongoing

## 2021-05-12 NOTE — ANESTHESIA PRE PROCEDURE
Department of Anesthesiology  Preprocedure Note       Name:  Delaney Gregory   Age:  77 y.o.  :  1955                                          MRN:  [id number]         Date:  2021      Surgeon: Alfonso Hamilton):  Zion York MD    Procedure: Procedure(s):  CYSTOSCOPY RETROGRADE PYELOGRAM stent placement    Medications prior to admission:   Prior to Admission medications    Medication Sig Start Date End Date Taking?  Authorizing Provider   sennosides-docusate sodium (SENOKOT-S) 8.6-50 MG tablet Take 1 tablet by mouth daily   Yes Historical Provider, MD   NONFORMULARY superbeta prostate BID    Historical Provider, MD   CRANBERRY PO Take by mouth daily 3 tabs daily    Historical Provider, MD       Current medications:    Current Facility-Administered Medications   Medication Dose Route Frequency Provider Last Rate Last Admin    metoprolol (LOPRESSOR) injection 5 mg  5 mg Intravenous Q6H PRN Karla Brown Orlop, DO        oxybutynin (DITROPAN) tablet 5 mg  5 mg Oral TID PRN Zion York MD   5 mg at 21 0121    lidocaine (XYLOCAINE) 2 % uro-jet 5 mL  5 mL Topical PRN Vickey Merck, APRN - CNP        nicotine (NICODERM CQ) 21 MG/24HR 1 patch  1 patch Transdermal Daily Johnnie Wyman MD   1 patch at 21 0831    ALPRAZolam (XANAX) tablet 0.5 mg  0.5 mg Oral TID PRN Johnnie Wyman MD   0.5 mg at 21 1730    sodium chloride flush 0.9 % injection 5-40 mL  5-40 mL Intravenous 2 times per day Johnnie Wyman MD   10 mL at 21    sodium chloride flush 0.9 % injection 5-40 mL  5-40 mL Intravenous PRN Johnnie Wyman MD        0.9 % sodium chloride infusion  25 mL Intravenous PRN Johnnie Wyman MD        promethazine (PHENERGAN) tablet 12.5 mg  12.5 mg Oral Q6H PRN Johnnie Wyman MD        Or    ondansetron (ZOFRAN) injection 4 mg  4 mg Intravenous Q6H PRN Johnnie Wyman MD        magnesium hydroxide (MILK OF MAGNESIA) 400 MG/5ML suspension 30 mL  30 mL Oral Daily PRN Srikanth Scott MD        acetaminophen (TYLENOL) tablet 650 mg  650 mg Oral Q6H PRN Srikanth Scott MD        Or    acetaminophen (TYLENOL) suppository 650 mg  650 mg Rectal Q6H PRN Srikanth Scott MD        0.9 % sodium chloride infusion   Intravenous Continuous Calixto [place] Or[place],  mL/hr at 05/12/21 0831 New Bag at 05/12/21 0831    famotidine (PEPCID) tablet 20 mg  20 mg Oral Daily Srikanth Scott MD   20 mg at 05/11/21 0830    cefTRIAXone (ROCEPHIN) 1000 mg IVPB in 50 mL D5W minibag  1,000 mg Intravenous Q24H Srikanth Scott MD   Stopped at 05/11/21 1800    morphine (PF) injection 2 mg  2 mg Intravenous Q2H PRN Pranav Zazueta APRN - CNP   2 mg at 05/12/21 2566    Or    morphine sulfate (PF) injection 4 mg  4 mg Intravenous Q2H PRN Pranav Zazueta APRN - CNP   4 mg at 05/12/21 0441       Allergies:     Allergies   Allergen Reactions    Macrobid [Nitrofurantoin]      High intolerance - projectile diarrhea       Problem List:    Patient Active Problem List   Diagnosis Code    Gross hematuria R31.0    Heavy cigarette smoker (20-39 per day) F17.210    Family history of stomach cancer Z80.0    Family history of Alzheimer's disease Z82.0    Anxiety about health F41.8    Elevated PSA R97.20    Bilateral hydronephrosis due to bladder outlet obstruction N13.30    SANDEEP (acute kidney injury) ([facility] [address] N17.9    BPH with obstruction/lower urinary tract symptoms N40.1, N13.8    Severe malnutrition ([address] E43       Past Medical History:        Diagnosis Date    Hypertension        Past Surgical History:        Procedure Laterality Date    BACK SURGERY      HERNIA REPAIR      TONSILLECTOMY         Social History:    Social History     Tobacco Use    Smoking status: Current Every Day Smoker     Packs/day: 2.00     Types: Cigarettes    Smokeless tobacco: Never Used   Substance Use Topics    Alcohol use: Never     Frequency: Never Ready to quit: No  Counseling given: Yes      Vital Signs (Current):   Vitals:    05/12/21 0707 05/12/21 1148 05/12/21 1424 05/12/21 1527   BP: (!) 172/73 (!) 155/90  (!) 191/86   Pulse: 99 102  107   Resp: 18 18  18   Temp: 97.9 °F (36.6 °C) 97.9 °F (36.6 °C)  97.3 °F (36.3 °C)   TempSrc: Oral Oral  Oral   SpO2: 96% 97%  96%   Weight:       Height:   6' (1.829 m)                                               BP Readings from Last 3 Encounters:   05/12/21 (!) 191/86   05/30/19 (!) 160/94       NPO Status: Time of last liquid consumption: 0000                        Time of last solid consumption: 0000                        Date of last liquid consumption: 05/12/21                        Date of last solid food consumption: 05/12/21    BMI:   Wt Readings from Last 3 Encounters:   05/10/21 152 lb (68.9 kg)   05/30/19 186 lb (84.4 kg)     Body mass index is 20.61 kg/m². CBC:   Lab Results   Component Value Date    WBC 10.5 05/12/2021    RBC 3.25 05/12/2021    HGB 9.1 05/12/2021    HCT 29.4 05/12/2021    MCV 90.5 05/12/2021    RDW 13.4 05/12/2021     05/12/2021       CMP:   Lab Results   Component Value Date     05/12/2021    K 3.4 05/12/2021     05/12/2021    CO2 23 05/12/2021    BUN 16 05/12/2021    CREATININE 2.37 05/12/2021    GFRAA 33 05/12/2021    LABGLOM 28 05/12/2021    GLUCOSE 103 05/12/2021    CALCIUM 8.5 05/12/2021       POC Tests: No results for input(s): POCGLU, POCNA, POCK, POCCL, POCBUN, POCHEMO, POCHCT in the last 72 hours.     Coags: No results found for: PROTIME, INR, APTT    HCG (If Applicable): No results found for: PREGTESTUR, PREGSERUM, HCG, HCGQUANT     ABGs: No results found for: PHART, PO2ART, MTK2JWR, KOE2IJK, BEART, N4RDXJBH     Type & Screen (If Applicable):  No results found for: LABABO, LABRH    Drug/Infectious Status (If Applicable):  No results found for: HIV, HEPCAB    COVID-19 Screening (If Applicable):   Lab Results   Component Value Date    COVID19 Not Detected 05/12/2021           Anesthesia Evaluation    Airway: Mallampati: I  TM distance: >3 FB   Neck ROM: full  Mouth opening: > = 3 FB Dental:          Pulmonary:       (-) shortness of breath                           Cardiovascular:    (+) hypertension:,     (-)  angina and murmur                Neuro/Psych:      (-) CVA           GI/Hepatic/Renal:             Endo/Other:                     Abdominal:           Vascular:                                        Anesthesia Plan      general     ASA 2             Anesthetic plan and risks discussed with patient.                       Hua Avila MD   5/12/2021

## 2021-05-12 NOTE — CARE COORDINATION
Case Management Initial Discharge Plan  Thomas Campa,         Readmission Risk              Risk of Unplanned Readmission:        11             Met with:patient to discuss discharge plans. Information verified: address, contacts, phone number, , insurance Yes  PCP: No primary care provider on file. Date of last visit:  St. Provider: [facility] Medicare    Discharge Planning  Current Residence:     Living Arrangements:  Spouse/Significant Other   Home has ine stories/one stairs to climb  Support Systems:  Spouse/Significant Other  Current Services PTA:    Supplier: none  Patient able to perform ADL's:Independent  DME used to aid ambulation prior to admission: has cane at home-does not use/during admissionTBD    Potential Assistance Needed:  N/A    Pharmacy: [facility]   Potential Assistance Purchasing Medications:  No  Does patient want to participate in local refill/ meds to beds program?  No    Patient agreeable to home care: No  [place] of choice provided:  n/a      Type of Home Care Services:  None  Patient expects to be discharged to:  Home    Prior SNF/Rehab Placement and Facility: No  Agreeable to SNF/Rehab: No  [place] of choice provided: n/a   Evaluation: no    Expected Discharge date:  21  Follow Up Appointment: Best Day/ Time:      Transportation provider: family  Transportation arrangements needed for discharge: No    Discharge Plan: Met with patient and wife to discuss dc options. Patient lives with wife in one level home. He is independent with ADL's. He has a cane at home that he does not use. His plan is to return home with wife. He does not anticipate any needs. He does not have a PCP. Provided list of PCP.  Renetta Arnold 58        Electronically signed by CHERELLE Andrew on 21 at 11:15 AM EDT

## 2021-05-12 NOTE — PROGRESS NOTES
[facility]  Office: [address], DO, Jannie Farley, DO, Kevin Adams, DO, Tanikaelizabeth Barrett Blood, DO, Grant Huitron MD, Gennaro Macdonald MD, Brandie Adams MD, Jing Gandhi MD, Yessy Pedersen MD, Gayatri Fisher MD, Arias Wright MD, Martha Baptiste MD, Zachary Back DO, Beata Lundberg MD, Manuel Goldman DO, Ranulfo Franklin MD,  Neela Klein, DO, Cari Lyons MD, Harriett Eubanks MD, Yeimy Miller MD, Jose Rai MD, Summer Godfrey [facility], [address], [facility], Souleymane Kay, CNP, Fei Calhoun, CNS, Minda Killian, CNP, Niki Lynne, CNP, Roberto Herreras, CNP, Juan Luis Burger, CNP, Dorcas Rasheed, CNP, Milissa Pallas, PA-C, Trudi Soulier, [facility], Candice Jackson, CNP, Ynes Wolff, CNP, Gianna Adams, CNP, Zuhair Naqvi, CNP, Baudilio Freitas, [facility], [facility], [facility]    Progress Note    5/12/2021    10:46 AM    Name:   Luann Evans  MRN:     [id number]     [place]:      [de-identified]   Room:   [address] [place] [address] Day:  2  Admit Date:  5/10/2021  3:02 PM    PCP:   No primary care provider on file. Code Status:  Full Code    Subjective:     C/C:   Chief Complaint   Patient presents with    Hematuria     onset this am, sent from urgent care     Interval History Status: not changed. Patient with continued three-way Paige catheter in place with irrigation, continue hematuria. Denies any chest pain, shortness of breath, nausea or vomiting, fevers or chills or other complaints. Brief History: This is a 78-year-old male that presents with new onset of gross hematuria that started in the morning of May 10. Initially presented to an urgent care and was sent to the emergency room for evaluation. Is found to have bilateral hydronephrosis as well as bladder outlet obstruction mostly related to prostatic hypertrophy. His evidence of acute kidney injury versus chronic kidney disease at presentation.   Creatinine has continued to rise and is consistent with acute kidney injury. Review of Systems:     Constitutional:  negative for chills, fevers, sweats  Respiratory:  negative for cough, dyspnea on exertion, shortness of breath, wheezing  Cardiovascular:  negative for chest pain, chest pressure/discomfort, lower extremity edema, palpitations  Gastrointestinal:  negative for abdominal pain, constipation, diarrhea, nausea, vomiting  Neurological:  negative for dizziness, headache    Medications: Allergies: Allergies   Allergen Reactions    Macrobid [Nitrofurantoin]      High intolerance - projectile diarrhea       Current Meds:   Scheduled Meds:    nicotine  1 patch Transdermal Daily    sodium chloride flush  5-40 mL Intravenous 2 times per day    famotidine  20 mg Oral Daily    cefTRIAXone (ROCEPHIN) IV  1,000 mg Intravenous Q24H     Continuous Infusions:    sodium chloride      sodium chloride 125 mL/hr at 21 0831     PRN Meds: oxybutynin, lidocaine, ALPRAZolam, sodium chloride flush, sodium chloride, promethazine **OR** ondansetron, magnesium hydroxide, acetaminophen **OR** acetaminophen, morphine **OR** morphine    Data:     Past Medical History:   has a past medical history of Hypertension. Social History:   reports that he has been smoking cigarettes. He has been smoking about 2.00 packs per day. He has never used smokeless tobacco. He reports that he does not drink alcohol or use drugs. Family History: History reviewed. No pertinent family history. Vitals:  BP (!) 172/73   Pulse 99   Temp 97.9 °F (36.6 °C) (Oral)   Resp 18   Ht 6' (1.829 m)   Wt 152 lb (68.9 kg)   SpO2 96%   BMI 20.61 kg/m²   Temp (24hrs), Av.7 °F (36.5 °C), Min:97.5 °F (36.4 °C), Max:98.1 °F (36.7 °C)    No results for input(s): POCGLU in the last 72 hours. I/O (24Hr):     Intake/Output Summary (Last 24 hours) at 2021 1046  Last data filed at 2021 0640  Gross per 24 hour   Intake 4010 ml   Output 76098 ml   Net -8590 ml       Labs:  Hematology:  Recent Labs     05/10/21  1526 05/12/21  0408   WBC 10.5 10.5   RBC 4.02* 3.25*   HGB 11.2* 9.1*   HCT 35.6* 29.4*   MCV 88.6 90.5   MCH 27.9 28.0   MCHC 31.5 31.0   RDW 13.5 13.4    247   MPV 8.3 8.2     Chemistry:  Recent Labs     05/10/21  1526 05/11/21  0540 05/12/21  0408    142 141   K 3.2* 3.4* 3.4*    105 106   CO2 25 22 23   GLUCOSE 106* 102* 103*   BUN 14 17 16   CREATININE 1.74* 2.84* 2.37*   MG  --  1.8 1.7   ANIONGAP 11 15 12   LABGLOM 39* 22* 28*   GFRAA 48* 27* 33*   CALCIUM 9.4 8.9 8.5*   PSA 43.54*  --   --      Recent Labs     05/10/21  1526   LABA1C 4.6     ABG:No results found for: POCPH, PHART, PH, POCPCO2, FWX8KWM, PCO2, POCPO2, PO2ART, PO2, POCHCO3, LML7KTO, HCO3, NBEA, PBEA, BEART, BE, THGBART, THB, ARY0ZCQ, AYPZ7NOS, W6VJAGBJ, O2SAT, FIO2  Lab Results   Component Value Date/Time    SPECIAL RT [facility] 05/10/2021 06:10 PM     Lab Results   Component Value Date/Time    CULTURE NO GROWTH 2 DAYS 05/10/2021 06:10 PM       Radiology:  Ct Abdomen Pelvis Wo Contrast Additional Contrast? None    Result Date: 5/10/2021  1. Findings compatible with bladder outlet obstruction, likely secondary to prostatomegaly, with associated bilateral moderately severe hydroureteronephrosis. Underlying cystitis is also likely present. 2.  3 mm layering nonobstructing stone in the distal left ureter. Bilateral nephrolithiasis is also present.        Physical Examination:        General appearance:  alert, cooperative and no distress  Mental Status:  oriented to person, place and time and normal affect  Lungs:  clear to auscultation bilaterally, normal effort  Heart:  regular rate and rhythm, no murmur  Abdomen:  soft, nontender, nondistended, normal bowel sounds, no masses, hepatomegaly, splenomegaly  Extremities:  no edema, redness, tenderness in the calves  Skin:  no gross lesions, rashes, induration    Assessment:        Hospital Problems           Last Modified POA * (Principal) Bilateral hydronephrosis due to bladder outlet obstruction 5/11/2021 Yes    Gross hematuria 5/11/2021 Yes    Heavy cigarette smoker (20-39 per day) 5/10/2021 Yes    Family history of stomach cancer 5/10/2021 Yes    Family history of Alzheimer's disease 5/10/2021 Yes    Anxiety about health 5/10/2021 Yes    Elevated PSA 5/11/2021 Yes    SANDEEP (acute kidney injury) ([address] 5/11/2021 Yes    BPH with obstruction/lower urinary tract symptoms 5/11/2021 Yes          Plan:        Continue IV hydration, continue present rate  Avoid nephrotoxic agents, monitor renal function for recovery. Creatinine improving at this point.   Urology consultation, anticipate cystoscopy  Continue present antibiotics pending urine culture  GI and DVT prophylaxis, no chemical prophylaxis due to acute bleeding  Covid swab today for upcoming procedure  Tobacco cessation  Correct electrolytes as needed  Discussed with family at bedside    Rain Sung DO  5/12/2021  10:46 AM

## 2021-05-12 NOTE — PROGRESS NOTES
Comprehensive Nutrition Assessment    Type and Reason for Visit:  Initial, Positive Nutrition Screen    Nutrition Recommendations/Plan:   1.) Continue NPO with diet advancement as tolerated   2.) Once diet advances, suggest oral nutritional supplement addition   3.) Will monitor diet advancement, labs, weight, PO    Nutrition Assessment:  Pt admitted with hematuria. Note patient states he has not been in a hospital for 40+ years and he is anxious about it. Pt and patients family member state in the past month he has lost 20-25#. He had past diarrheal episodes with a PO intake only consiting of popsicles, and some liquids per patient. Pt states he was not eating solid foods for months. This contributed to his weight loss. UBW was 179# with a CBW of 152 which shows a 15% weight loss. Pt currently NPO. He has been eating some foods including peppered steak yesterday that was good. Other foods he says were too cold, or too hard to eat d/t him not having teeth. Writer will make a note of this. Will follow up with patient once diet advances for likely supplement addition to diet. Will continue to monitor diet advancement, labs, weight, PO. Malnutrition Assessment:  Malnutrition Status:  Severe malnutrition    Context:  Acute Illness     Findings of the 6 clinical characteristics of malnutrition:  Energy Intake:  7 - 50% or less of estimated energy requirements for 5 or more days  Weight Loss:  7 - Greater than 5% over 1 month     Body Fat Loss:  Unable to assess     Muscle Mass Loss:  Unable to assess    Fluid Accumulation:  No significant fluid accumulation     Strength:  Not Performed    Estimated Daily Nutrient Needs:  Energy (kcal):  9372-4547(25-30kcal/kg); Weight Used for Energy Requirements:  Current     Protein (g):  55-68.9(0.8-1g/kg);  Weight Used for Protein Requirements:  Current        Fluid (ml/day):  4680-1074; Method Used for Fluid Requirements:  1 ml/kcal      Nutrition Related Findings: Hematuria. Weight loss (15% in 1 month per patient reported UBW). Currently NPO       Current Nutrition Therapies:    Diet NPO, After Midnight    Anthropometric Measures:  · Height: 6' (182.9 cm)  · Current Body Weight: 152 lb (68.9 kg)   · Admission Body Weight:      · Usual Body Weight: 179 lb (81.2 kg)     · Ideal Body Weight: 178 lbs; % Ideal Body Weight 85.4 %   · BMI: 20.6  · BMI Categories: Underweight (BMI less than 22) age over 72       Nutrition Diagnosis:   · Severe malnutrition related to altered GI function, inadequate protein-energy intake as evidenced by NPO or clear liquid status due to medical condition, poor intake prior to admission, BMI, weight loss greater than or equal to 5% in 1 month, diarrhea      Nutrition Interventions:   Food and/or Nutrient Delivery:  Continue NPO(Advance diet as tolerated)  Nutrition Education/Counseling:  No recommendation at this time   Coordination of Nutrition Care:  Continue to monitor while inpatient    Goals:  Diet advancement with PO intake >50% when diet advances       Nutrition Monitoring and Evaluation:   Food/Nutrient Intake Outcomes:  Diet Advancement/Tolerance  Physical Signs/Symptoms Outcomes:  Biochemical Data, Chewing or Swallowing, Diarrhea, GI Status, Weight     Discharge Planning:     Too soon to determine     Electronically signed by Chapincito Wells MS, RD, LD on 5/12/21 at 2:36 PM EDT    Contact: Kenyatta Simpson LD  Clinical Dietitian   [phone]

## 2021-05-12 NOTE — ANESTHESIA POSTPROCEDURE EVALUATION
Department of Anesthesiology  Postprocedure Note    Patient: Tabitha Yi  MRN: [id number]  YOB: 1955  Date of evaluation: 5/12/2021  Time:  6:00 PM     Procedure Summary     Date: 05/12/21 Room / Location: [facility]    Anesthesia Start: 2977 Anesthesia Stop: 4717    Procedure: CYSTOSCOPY BLADDER IRRIGATION CLOT EVACUATION (N/A Bladder) Diagnosis: (BIRD HYDRONEPHROSIS)    Surgeons: Blanca De Los Santos MD Responsible Provider: Rosenda Chong MD    Anesthesia Type: general ASA Status: 2          Anesthesia Type: general    Devante Phase I:      Devante Phase II:      Last vitals: Reviewed and per EMR flowsheets.        Anesthesia Post Evaluation    Complications: no

## 2021-05-12 NOTE — PLAN OF CARE
Problem: Falls - Risk of:  Goal: Will remain free from falls  Description: Will remain free from falls  5/11/2021 2018 by Magy Chang RN  Outcome: Ongoing  5/11/2021 1744 by Scott Diego RN  Outcome: Ongoing  Note: Siderails up x 2  Hourly rounding  Call light in reach  Instructed to call for assist before attempting out of bed. Remains free from falls and accidental injury at this time   Floor free from obstacles  Bed is locked and in lowest position  Adequate lighting provided  Bed alarm on, [place] star and Stay with Me signs posted      Goal: Absence of physical injury  Description: Absence of physical injury  5/11/2021 2018 by Magy Chang RN  Outcome: Ongoing  5/11/2021 1744 by Scott Diego RN  Outcome: Ongoing     Problem: SAFETY  Goal: Free from accidental physical injury  5/11/2021 2018 by Magy Chang RN  Outcome: Ongoing  5/11/2021 1744 by Scott Diego RN  Outcome: Ongoing  Goal: Free from intentional harm  5/11/2021 2018 by Magy Chang RN  Outcome: Ongoing  5/11/2021 1744 by Scott Diego RN  Outcome: Ongoing     Problem: DAILY CARE  Goal: Daily care needs are met  5/11/2021 2018 by Magy Chang RN  Outcome: Ongoing  5/11/2021 1744 by Scott Diego RN  Outcome: Ongoing     Problem: PAIN  Goal: Patient's pain/discomfort is manageable  5/11/2021 2018 by Magy Chang RN  Outcome: Ongoing  5/11/2021 1744 by Scott Diego RN  Outcome: Ongoing     Problem: SKIN INTEGRITY  Goal: Skin integrity is maintained or improved  5/11/2021 2018 by Magy Chang RN  Outcome: Ongoing  5/11/2021 1744 by Scott Diego RN  Outcome: Ongoing     Problem: KNOWLEDGE DEFICIT  Goal: Patient/S.O. demonstrates understanding of disease process, treatment plan, medications, and discharge instructions.   5/11/2021 2018 by Magy Chang RN  Outcome: Ongoing  5/11/2021 1744 by Scott Diego RN  Outcome: Ongoing     Problem: DISCHARGE BARRIERS  Goal: Patient's continuum of care needs are met  5/11/2021 2018 by Magy Chang RN  Outcome: Ongoing  5/11/2021 1744 by Isela Glover RN  Outcome: Ongoing

## 2021-05-13 LAB
ABSOLUTE EOS #: 0.32 K/UL (ref 0–0.44)
ABSOLUTE IMMATURE GRANULOCYTE: 0.06 K/UL (ref 0–0.3)
ABSOLUTE LYMPH #: 1.79 K/UL (ref 1.1–3.7)
ABSOLUTE MONO #: 0.85 K/UL (ref 0.1–1.2)
ANION GAP SERPL CALCULATED.3IONS-SCNC: 11 MMOL/L (ref 9–17)
BASOPHILS # BLD: 0 % (ref 0–2)
BASOPHILS ABSOLUTE: 0.03 K/UL (ref 0–0.2)
BUN BLDV-MCNC: 14 MG/DL (ref 8–23)
BUN/CREAT BLD: 6 (ref 9–20)
CALCIUM SERPL-MCNC: 8 MG/DL (ref 8.6–10.4)
CHLORIDE BLD-SCNC: 108 MMOL/L (ref 98–107)
CO2: 18 MMOL/L (ref 20–31)
CREAT SERPL-MCNC: 2.23 MG/DL (ref 0.7–1.2)
DIFFERENTIAL TYPE: ABNORMAL
EOSINOPHILS RELATIVE PERCENT: 3 % (ref 1–4)
GFR AFRICAN AMERICAN: 36 ML/MIN
GFR NON-AFRICAN AMERICAN: 30 ML/MIN
GFR SERPL CREATININE-BSD FRML MDRD: ABNORMAL ML/MIN/{1.73_M2}
GFR SERPL CREATININE-BSD FRML MDRD: ABNORMAL ML/MIN/{1.73_M2}
GLUCOSE BLD-MCNC: 105 MG/DL (ref 70–99)
HCT VFR BLD CALC: 27.2 % (ref 40.7–50.3)
HEMOGLOBIN: 8.5 G/DL (ref 13–17)
IMMATURE GRANULOCYTES: 1 %
LYMPHOCYTES # BLD: 14 % (ref 24–43)
MAGNESIUM: 1.5 MG/DL (ref 1.6–2.6)
MCH RBC QN AUTO: 27.9 PG (ref 25.2–33.5)
MCHC RBC AUTO-ENTMCNC: 31.3 G/DL (ref 28.4–34.8)
MCV RBC AUTO: 89.2 FL (ref 82.6–102.9)
MONOCYTES # BLD: 7 % (ref 3–12)
NRBC AUTOMATED: 0 PER 100 WBC
PDW BLD-RTO: 13.4 % (ref 11.8–14.4)
PLATELET # BLD: 249 K/UL (ref 138–453)
PLATELET ESTIMATE: ABNORMAL
PMV BLD AUTO: 8.4 FL (ref 8.1–13.5)
POTASSIUM SERPL-SCNC: 3.3 MMOL/L (ref 3.7–5.3)
RBC # BLD: 3.05 M/UL (ref 4.21–5.77)
RBC # BLD: ABNORMAL 10*6/UL
SEG NEUTROPHILS: 75 % (ref 36–65)
SEGMENTED NEUTROPHILS ABSOLUTE COUNT: 9.4 K/UL (ref 1.5–8.1)
SODIUM BLD-SCNC: 137 MMOL/L (ref 135–144)
WBC # BLD: 12.5 K/UL (ref 3.5–11.3)
WBC # BLD: ABNORMAL 10*3/UL

## 2021-05-13 PROCEDURE — 6370000000 HC RX 637 (ALT 250 FOR IP): Performed by: INTERNAL MEDICINE

## 2021-05-13 PROCEDURE — 2500000003 HC RX 250 WO HCPCS: Performed by: NURSE PRACTITIONER

## 2021-05-13 PROCEDURE — 6360000002 HC RX W HCPCS: Performed by: NURSE PRACTITIONER

## 2021-05-13 PROCEDURE — 36415 COLL VENOUS BLD VENIPUNCTURE: CPT

## 2021-05-13 PROCEDURE — 2500000003 HC RX 250 WO HCPCS: Performed by: UROLOGY

## 2021-05-13 PROCEDURE — 6360000002 HC RX W HCPCS: Performed by: INTERNAL MEDICINE

## 2021-05-13 PROCEDURE — 85025 COMPLETE CBC W/AUTO DIFF WBC: CPT

## 2021-05-13 PROCEDURE — 2580000003 HC RX 258: Performed by: UROLOGY

## 2021-05-13 PROCEDURE — 2060000000 HC ICU INTERMEDIATE R&B

## 2021-05-13 PROCEDURE — 80048 BASIC METABOLIC PNL TOTAL CA: CPT

## 2021-05-13 PROCEDURE — 99232 SBSQ HOSP IP/OBS MODERATE 35: CPT | Performed by: INTERNAL MEDICINE

## 2021-05-13 PROCEDURE — 6360000002 HC RX W HCPCS: Performed by: UROLOGY

## 2021-05-13 PROCEDURE — 6370000000 HC RX 637 (ALT 250 FOR IP): Performed by: UROLOGY

## 2021-05-13 PROCEDURE — 83735 ASSAY OF MAGNESIUM: CPT

## 2021-05-13 PROCEDURE — 6370000000 HC RX 637 (ALT 250 FOR IP): Performed by: NURSE PRACTITIONER

## 2021-05-13 RX ORDER — METOPROLOL TARTRATE 5 MG/5ML
5 INJECTION INTRAVENOUS ONCE
Status: COMPLETED | OUTPATIENT
Start: 2021-05-13 | End: 2021-05-13

## 2021-05-13 RX ORDER — POTASSIUM CHLORIDE 20 MEQ/1
40 TABLET, EXTENDED RELEASE ORAL ONCE
Status: COMPLETED | OUTPATIENT
Start: 2021-05-13 | End: 2021-05-13

## 2021-05-13 RX ORDER — HYDRALAZINE HYDROCHLORIDE 20 MG/ML
10 INJECTION INTRAMUSCULAR; INTRAVENOUS EVERY 6 HOURS PRN
Status: DISCONTINUED | OUTPATIENT
Start: 2021-05-13 | End: 2021-05-15 | Stop reason: HOSPADM

## 2021-05-13 RX ORDER — SENNA PLUS 8.6 MG/1
2 TABLET ORAL 2 TIMES DAILY PRN
Status: DISCONTINUED | OUTPATIENT
Start: 2021-05-13 | End: 2021-05-15 | Stop reason: HOSPADM

## 2021-05-13 RX ORDER — MAGNESIUM SULFATE IN WATER 40 MG/ML
2000 INJECTION, SOLUTION INTRAVENOUS ONCE
Status: COMPLETED | OUTPATIENT
Start: 2021-05-13 | End: 2021-05-13

## 2021-05-13 RX ADMIN — MAGNESIUM SULFATE HEPTAHYDRATE 2000 MG: 40 INJECTION, SOLUTION INTRAVENOUS at 10:43

## 2021-05-13 RX ADMIN — SODIUM CHLORIDE: 9 INJECTION, SOLUTION INTRAVENOUS at 02:47

## 2021-05-13 RX ADMIN — MAGNESIUM HYDROXIDE 30 ML: 400 SUSPENSION ORAL at 09:54

## 2021-05-13 RX ADMIN — METOPROLOL TARTRATE 5 MG: 5 INJECTION INTRAVENOUS at 02:58

## 2021-05-13 RX ADMIN — CEFTRIAXONE SODIUM 1000 MG: 1 INJECTION, POWDER, FOR SOLUTION INTRAMUSCULAR; INTRAVENOUS at 17:22

## 2021-05-13 RX ADMIN — POTASSIUM CHLORIDE 40 MEQ: 1500 TABLET, EXTENDED RELEASE ORAL at 06:52

## 2021-05-13 RX ADMIN — METOPROLOL TARTRATE 5 MG: 5 INJECTION INTRAVENOUS at 06:53

## 2021-05-13 RX ADMIN — HYDRALAZINE HYDROCHLORIDE 10 MG: 20 INJECTION INTRAMUSCULAR; INTRAVENOUS at 05:47

## 2021-05-13 RX ADMIN — SENNOSIDES 17.2 MG: 8.6 TABLET, FILM COATED ORAL at 13:59

## 2021-05-13 RX ADMIN — FAMOTIDINE 20 MG: 20 TABLET ORAL at 09:54

## 2021-05-13 RX ADMIN — HYDRALAZINE HYDROCHLORIDE 10 MG: 20 INJECTION INTRAMUSCULAR; INTRAVENOUS at 20:23

## 2021-05-13 ASSESSMENT — PAIN SCALES - GENERAL
PAINLEVEL_OUTOF10: 4
PAINLEVEL_OUTOF10: 0
PAINLEVEL_OUTOF10: 0
PAINLEVEL_OUTOF10: 4

## 2021-05-13 NOTE — PROGRESS NOTES
[facility]ne [facility]   Urology Progress Note            Subjective: follow-up bilateral hydronephrosis and acute kidney injury    Patient Vitals for the past 24 hrs:   BP Temp Temp src Pulse Resp SpO2 Height   05/13/21 0438 (!) 173/83 98.2 °F (36.8 °C) Oral 105 16 98 % --   05/13/21 0256 (!) 172/77 -- -- 116 -- -- --   05/13/21 0019 (!) 159/83 98.2 °F (36.8 °C) Oral 118 16 100 % --   05/12/21 1931 (!) 147/72 99.5 °F (37.5 °C) Oral 111 16 100 % --   05/12/21 1829 134/80 98.1 °F (36.7 °C) Oral 110 18 96 % --   05/12/21 1800 (!) 158/81 97.9 °F (36.6 °C) Temporal 108 21 94 % --   05/12/21 1745 (!) 156/92 -- -- 106 15 97 % --   05/12/21 1740 (!) 146/76 -- -- 106 14 97 % --   05/12/21 1735 (!) 159/80 -- -- 103 14 97 % --   05/12/21 1730 (!) 154/81 -- -- 99 13 95 % --   05/12/21 1715 (!) 177/97 -- -- 124 18 100 % --   05/12/21 1710 (!) 158/91 97.3 °F (36.3 °C) Temporal 122 22 98 % --   05/12/21 1527 (!) 191/86 97.3 °F (36.3 °C) Oral 107 18 96 % --   05/12/21 1424 -- -- -- -- -- -- 6' (1.829 m)   05/12/21 1148 (!) 155/90 97.9 °F (36.6 °C) Oral 102 18 97 % --   05/12/21 0707 (!) 172/73 97.9 °F (36.6 °C) Oral 99 18 96 % --       Intake/Output Summary (Last 24 hours) at 5/13/2021 0522  Last data filed at 5/13/2021 0455  Gross per 24 hour   Intake 5769 ml   Output 1345 ml   Net 4424 ml       Recent Labs     05/10/21  1526 05/12/21  0408   WBC 10.5 10.5   HGB 11.2* 9.1*   HCT 35.6* 29.4*   MCV 88.6 90.5    247     Recent Labs     05/10/21  1526 05/11/21  0540 05/12/21  0408    142 141   K 3.2* 3.4* 3.4*    105 106   CO2 25 22 23   BUN 14 17 16   CREATININE 1.74* 2.84* 2.37*       Recent Labs     05/12/21  1646   COLORU RED*   PHUR 7.0   WBCUA 10 TO 20   RBCUA TOO NUMEROUS TO COUNT   MUCUS NOT REPORTED   TRICHOMONAS NOT REPORTED   YEAST NOT REPORTED   BACTERIA RARE*   SPECGRAV 1.020   LEUKOCYTESUR MOD*   UROBILINOGEN ELEVATED*   BILIRUBINUR NEGATIVE       Additional Lab/culture results:    Physical Exam: patient had a cystoscopy yesterday demonstrating thickened bladder wall with severe hemorrhagic cystitis and clot retention    Ureteral orifices could not be cannulated for stent placement    Interval Imaging Findings:    Impression:    Patient Active Problem List   Diagnosis    Gross hematuria    Heavy cigarette smoker (20-39 per day)    Family history of stomach cancer    Family history of Alzheimer's disease    Anxiety about health    Elevated PSA    Bilateral hydronephrosis due to bladder outlet obstruction    SANDEEP (acute kidney injury) ([address]    BPH with obstruction/lower urinary tract symptoms    Severe malnutrition ([address]       Plan: discussed status with patient and family with the worsening of renal function percutaneous nephroureteral catheters would be recommended    Chilango Rouse  5:22 AM 5/13/2021

## 2021-05-13 NOTE — PROGRESS NOTES
[facility]  Office: [address], DO, Alley Ken, DO, Juan Josegarrett Marsh, DO, Argenis Carter Blood, DO, Linda Hamilton MD, Eva Vazquez MD, Gonzalo Morales MD, Leah Barahona MD, Kaitlynn Martell MD, Donalynn Osler, MD, Francisco J Booth MD, Caleb Rick MD, Emelia Gonzalez, DO, Nikita Panda MD, Kristopher Oneill, DO, Miguel Huggins MD,  Anton Juárez DO, Larry Hills MD, Sayra Shaver MD, Gloria Day MD, Isiah Low MD, [facility] Center[facility]arlene Vasquez, [facility], [address], [facility], Eli Connolly, CNP, Meenakshi Leong, CNS, Irena Magdaleno, CNP, Lei Cochran, CNP, Dhiraj Manley, CNP, Enrique Zacarias, CNP, Paola Rodriguez, CNP, Olimpia White PA-C, Johnny De Leon, [facility], Gabriella Pittman, CNP, Tl Cordero, CNP, Soheila Kunz, CNP, Sheila Tariq, CNP, Karina Doe, [facility], Nicole Montesinos, [facility]    Progress Note    5/13/2021    10:13 AM    Name:   Jody Lopez  MRN:     [id number]     [place]:      [de-identified]   Room:   [address] [place] [address] Day:  3  Admit Date:  5/10/2021  3:02 PM    PCP:   No primary care provider on file. Code Status:  Full Code    Subjective:     C/C:   Chief Complaint   Patient presents with    Hematuria     onset this am, sent from urgent care     Interval History Status: not changed. Patient with continued three-way Paige for irrigation, hematuria is waxing and waning but not resolved. Denies any chest pain, shortness of breath, nausea or vomiting, fevers or chills. Urology considering nephrostomy tubes in the next 24 hours. Brief History: This is a 55-year-old male that presents with new onset of gross hematuria that started in the morning of May 10. Initially presented to an urgent care and was sent to the emergency room for evaluation. Is found to have bilateral hydronephrosis as well as bladder outlet obstruction mostly related to prostatic hypertrophy.   His evidence of acute kidney injury versus chronic kidney (Last 24 hours) at 5/13/2021 1013  Last data filed at 5/13/2021 0800  Gross per 24 hour   Intake 5769 ml   Output 1445 ml   Net 4324 ml       Labs:  Hematology:  Recent Labs     05/10/21  1526 05/12/21  0408 05/13/21  0507   WBC 10.5 10.5 12.5*   RBC 4.02* 3.25* 3.05*   HGB 11.2* 9.1* 8.5*   HCT 35.6* 29.4* 27.2*   MCV 88.6 90.5 89.2   MCH 27.9 28.0 27.9   MCHC 31.5 31.0 31.3   RDW 13.5 13.4 13.4    247 249   MPV 8.3 8.2 8.4     Chemistry:  Recent Labs     05/10/21  1526 05/11/21  0540 05/12/21  0408 05/13/21  0507    142 141 137   K 3.2* 3.4* 3.4* 3.3*    105 106 108*   CO2 25 22 23 18*   GLUCOSE 106* 102* 103* 105*   BUN 14 17 16 14   CREATININE 1.74* 2.84* 2.37* 2.23*   MG  --  1.8 1.7 1.5*   ANIONGAP 11 15 12 11   LABGLOM 39* 22* 28* 30*   GFRAA 48* 27* 33* 36*   CALCIUM 9.4 8.9 8.5* 8.0*   PSA 43.54*  --   --   --      Recent Labs     05/10/21  1526   LABA1C 4.6     ABG:No results found for: POCPH, PHART, PH, POCPCO2, KAS4OPS, PCO2, POCPO2, PO2ART, PO2, POCHCO3, JWF6HLF, HCO3, NBEA, PBEA, BEART, BE, THGBART, THB, PVN6FNC, XECF6BOD, O1KMTDVS, O2SAT, FIO2  Lab Results   Component Value Date/Time    SPECIAL RT [facility] 05/10/2021 06:10 PM     Lab Results   Component Value Date/Time    CULTURE NO GROWTH 3 DAYS 05/10/2021 06:10 PM       Radiology:  Ct Abdomen Pelvis Wo Contrast Additional Contrast? None    Result Date: 5/12/2021  1. Interval placement of Paige catheter and decompression of the bladder. Findings of cystitis are again demonstrated. 2.  No significant change in moderately severe bilateral hydroureteronephrosis. No perinephric stranding. Unchanged position of small layering stone in the distal left ureter. 3.  Bilateral nephrolithiasis. Ct Abdomen Pelvis Wo Contrast Additional Contrast? None    Result Date: 5/10/2021  1. Findings compatible with bladder outlet obstruction, likely secondary to prostatomegaly, with associated bilateral moderately severe hydroureteronephrosis. Underlying cystitis is also likely present. 2.  3 mm layering nonobstructing stone in the distal left ureter. Bilateral nephrolithiasis is also present.        Physical Examination:        General appearance:  alert, cooperative and no distress  Mental Status:  oriented to person, place and time and normal affect  Lungs:  clear to auscultation bilaterally, normal effort  Heart:  regular rate and rhythm, no murmur  Abdomen:  soft, nontender, nondistended, normal bowel sounds, no masses, hepatomegaly, splenomegaly  Extremities:  no edema, redness, tenderness in the calves  Skin:  no gross lesions, rashes, induration    Assessment:        Hospital Problems           Last Modified POA    * (Principal) Bilateral hydronephrosis due to bladder outlet obstruction 5/11/2021 Yes    Gross hematuria 5/11/2021 Yes    Heavy cigarette smoker (20-39 per day) 5/10/2021 Yes    Family history of stomach cancer 5/10/2021 Yes    Family history of Alzheimer's disease 5/10/2021 Yes    Anxiety about health 5/10/2021 Yes    Elevated PSA 5/11/2021 Yes    SANDEEP (acute kidney injury) ([address] 5/11/2021 Yes    BPH with obstruction/lower urinary tract symptoms 5/11/2021 Yes    Severe malnutrition ([address] 5/12/2021 Yes          Plan:        Discussed with multiple family members at bedside  Continue bladder irrigation  Monitor renal function, suspect will not return to baseline until obstructive issues resolved  Avoid nephrotoxic agents  Nutritional supplements  PT and OT  GI and DVT prophylaxis  See orders for details    Olga Vasques DO  5/13/2021  10:13 AM

## 2021-05-13 NOTE — PLAN OF CARE
Problem: Falls - Risk of:  Goal: Will remain free from falls  Description: Will remain free from falls  5/12/2021 2013 by Aicha Rabago RN  Outcome: Ongoing  Note: Pt fall risk, fall band present, falling star, safety alarm activated and in use as needed. Hourly rounding performed. Pt encouraged to use call light. See Keanu Michael fall risk assessment. 5/12/2021 1214 by Kostas Waddell RN  Outcome: Ongoing  Note: Patient is a fall risk during this admission. Fall risk assessment was performed. Patient is absent of falls. Bed is in the lowest position. Wheels on the bed are locked. Call light and bed side table are within reach. Clutter is removed. Patient was educated to call out when needing assistance or wanting to get out of bed. Patient offered toileting assistance during rounding. Hourly rounds have been performed. Goal: Absence of physical injury  Description: Absence of physical injury  5/12/2021 2013 by Aicha Rabago RN  Outcome: Ongoing  Note: Non-skid socks in place, up with assistance, bed in lowest position, bed exit & alarm as needed, provide toileting every 2 hours an d as needed. 5/12/2021 1214 by Kostas Waddell RN  Outcome: Ongoing     Problem: SAFETY  Goal: Free from accidental physical injury  5/12/2021 2013 by Aicha Rabago RN  Outcome: Ongoing  5/12/2021 1214 by Kostas Waddell RN  Outcome: Ongoing  Goal: Free from intentional harm  5/12/2021 2013 by Aicha Rabago RN  Outcome: Ongoing  Note: Assessed for potential for self-harm. Assessed for suicidal ideation, suicidal plan, and/or risk for access for means to harm self. Provided reassurance. 5/12/2021 1214 by Kostas Waddell RN  Outcome: Ongoing     Problem: DAILY CARE  Goal: Daily care needs are met  5/12/2021 2013 by Aicha Rabago RN  Outcome: Ongoing  Note: Assess ability to perform self care. Assist with ADL's as needed. Assessed for skin breakdown.    5/12/2021 1214 by Kostas Waddell RN  Outcome: Ongoing     Problem: PAIN  Goal: Patient's pain/discomfort is manageable  5/12/2021 2013 by James Perez RN  Outcome: Ongoing  Note: Monitoring pain with each assessment and prn. GOSIA 0-10 pain scale utilized. Non-pharmacological measures to be encouraged prior to pharmacological measures. 5/12/2021 1214 by Chip Osman RN  Outcome: Ongoing  Note: Pt medicated with pain medication prn. Assessed all pain characteristics including level, type, location, frequency, and onset. Non-pharmacologic interventions offered to pt as well. Pt states pain is tolerable at this time. Will continue to monitor      Problem: SKIN INTEGRITY  Goal: Skin integrity is maintained or improved  5/12/2021 2013 by James Perez RN  Outcome: Ongoing  Note: Continuing to monitor for skin integrity risks. Patient independent with turning/repositioning. Turning/repositioning encouraged at least once every 2 hrs, and prn basis. Hygiene care being completed independently per patient; assistance provided when deemed necessary. 5/12/2021 1214 by Chip Osman RN  Outcome: Ongoing     Problem: KNOWLEDGE DEFICIT  Goal: Patient/S.O. demonstrates understanding of disease process, treatment plan, medications, and discharge instructions. 5/12/2021 2013 by James Perez RN  Outcome: Ongoing  Note: Assessed baseline knowledge. Provided teaching at level of understanding & pt's preferred method. Instructed pt on use of equipment. 5/12/2021 1214 by Chip Osman RN  Outcome: Ongoing     Problem: DISCHARGE BARRIERS  Goal: Patient's continuum of care needs are met  5/12/2021 2013 by James Perez RN  Outcome: Ongoing  Note: Care plan reviewed with patient.   Patient verbalizes understanding of the plan of care and contribute to goal setting.     5/12/2021 1214 by Chip Osman RN  Outcome: Ongoing     Problem: Pain:  Goal: Pain level will decrease  Description: Pain level will decrease  5/12/2021 2013 by James Perez RN  Outcome: Ongoing  Note: Monitoring pain with each assessment and prn. GOSIA 0-10 pain scale utilized. Non-pharmacological measures to be encouraged prior to pharmacological measures. 5/12/2021 1214 by Cristian Melendez RN  Outcome: Ongoing  Goal: Control of acute pain  Description: Control of acute pain  5/12/2021 2013 by Leeroy Cochran RN  Outcome: Ongoing  5/12/2021 1214 by Cristian Melendez RN  Outcome: Ongoing  Goal: Control of chronic pain  Description: Control of chronic pain  5/12/2021 2013 by Leeroy Cochran RN  Outcome: Ongoing  5/12/2021 1214 by Cristian Melendez RN  Outcome: Ongoing     Problem: Urinary Elimination:  Goal: Ability to achieve a balanced intake and output will improve  Description: Ability to achieve a balanced intake and output will improve  5/12/2021 2013 by Leeroy Cochran RN  Outcome: Ongoing  Note: Assessed for signs and symptoms of dehydration. Monitor intake and output every shift. Replace electrolytes as needed. Provided IV fluids per doctor and as needed. 5/12/2021 1214 by Cristian Melendez RN  Outcome: Ongoing  Goal: Will remain free from infection  Description: Will remain free from infection  5/12/2021 2013 by Leeroy Cochran RN  Outcome: Ongoing  Note: Monitor WBC's, temperature, and signs & symptoms of being febrile, including mental status. Assess for Paige catheter placement and/or removal per D.O.   5/12/2021 1214 by Cristian Melendez RN  Outcome: Ongoing     Problem: IP BALANCE  Goal: LTG - Patient will maintain balance to allow for safe/functional mobility  5/12/2021 2013 by Leeroy Cochran RN  Outcome: Ongoing  Note: Pt able to utilize fine body movements, including, but not limited to dressing without assistance.    5/12/2021 1417 by Barney Sacks, OT  Outcome: Ongoing     Problem: Nutrition  Goal: Optimal nutrition therapy  Outcome: Ongoing

## 2021-05-13 NOTE — PLAN OF CARE
Problem: Falls - Risk of:  Goal: Will remain free from falls  Description: Will remain free from falls  Outcome: Ongoing  Note: Siderails up x 2  Hourly rounding. Call light in reach. Instructed to call for assist before attempting out of bed. Remains free from falls and accidental injury at this time. Floor free from obstacles, and bed is locked and in lowest position. Adequate lighting provided. Patient calling out appropriately for needs. Problem: Falls - Risk of:  Goal: Absence of physical injury  Description: Absence of physical injury  Outcome: Ongoing     Problem: Pain:  Goal: Control of acute pain  Description: Control of acute pain  Outcome: Ongoing  Note: Patient states understanding of pain scale and interventions. Pain assessed with hourly rounding and PRN. Patient states pain level is 4/10 at this time he refuses pain medications. Patient repositioned and encouraged to turn every two hours. Will continue to monitor.

## 2021-05-13 NOTE — PROGRESS NOTES
Physical Therapy  DATE: 2021    NAME: Sean Mann  MRN: [id number]   : 1955    Patient not seen this date for Physical Therapy due to:  [] Blood transfusion in progress  [] Cancel by RN  [] Hemodialysis  [x]  Refusal by Patient   [] Spine Precautions   [] Strict Bedrest  [] Surgery  [] Testing      [x] Other patient refused x2, first due to pain second time due to wanting to rest.         [] PT being discontinued at this time. Patient independent. No further needs. [] PT being discontinued at this time as the patient has been transferred to hospice care. No further needs.     Norma Sapp, PT

## 2021-05-13 NOTE — CARE COORDINATION
Discharge planning    Chart reviewed and appreciate SS initial assessment. Patient lives at home with spouse. Has no DME and no PCP. PCP list was provided. Patient s/p cystoscopy yesterday that showed thickened bladder wall with severe hemorrhagic cystitis and clot retention. Ureteral orifices could not be cannulated for stent placement and urologist is recommending percutaneous nephroureteral catheters be placed. IF patient is agreeable and has nephro tubes placed will request urologist to sign the [address] [place] so home care can be obtained. Will have to discuss with patient regarding home care choices and [facility] list.     1259 Dr Linda Amaya up and awaiting to see how labs are tomorrow. Plan is possible  nephrostomy tube placed tomorrow but he is anticipating they will be pulled prior to discharge. He just needs to ensure the stents that are placed are working as should. Held home care discussion at this time. Patient very anxious about the tubes as it is and stated that he does not want to go home with them.

## 2021-05-14 LAB
ANION GAP SERPL CALCULATED.3IONS-SCNC: 10 MMOL/L (ref 9–17)
BUN BLDV-MCNC: 13 MG/DL (ref 8–23)
BUN/CREAT BLD: 7 (ref 9–20)
CALCIUM SERPL-MCNC: 8 MG/DL (ref 8.6–10.4)
CHLORIDE BLD-SCNC: 111 MMOL/L (ref 98–107)
CO2: 18 MMOL/L (ref 20–31)
CREAT SERPL-MCNC: 1.91 MG/DL (ref 0.7–1.2)
CULTURE: NO GROWTH
GFR AFRICAN AMERICAN: 43 ML/MIN
GFR NON-AFRICAN AMERICAN: 35 ML/MIN
GFR SERPL CREATININE-BSD FRML MDRD: ABNORMAL ML/MIN/{1.73_M2}
GFR SERPL CREATININE-BSD FRML MDRD: ABNORMAL ML/MIN/{1.73_M2}
GLUCOSE BLD-MCNC: 111 MG/DL (ref 70–99)
Lab: NORMAL
MAGNESIUM: 1.9 MG/DL (ref 1.6–2.6)
POTASSIUM SERPL-SCNC: 3.5 MMOL/L (ref 3.7–5.3)
SODIUM BLD-SCNC: 139 MMOL/L (ref 135–144)
SPECIMEN DESCRIPTION: NORMAL
SURGICAL PATHOLOGY REPORT: NORMAL

## 2021-05-14 PROCEDURE — 97535 SELF CARE MNGMENT TRAINING: CPT

## 2021-05-14 PROCEDURE — 99232 SBSQ HOSP IP/OBS MODERATE 35: CPT | Performed by: INTERNAL MEDICINE

## 2021-05-14 PROCEDURE — 6370000000 HC RX 637 (ALT 250 FOR IP): Performed by: INTERNAL MEDICINE

## 2021-05-14 PROCEDURE — 36415 COLL VENOUS BLD VENIPUNCTURE: CPT

## 2021-05-14 PROCEDURE — 6360000002 HC RX W HCPCS: Performed by: UROLOGY

## 2021-05-14 PROCEDURE — 2060000000 HC ICU INTERMEDIATE R&B

## 2021-05-14 PROCEDURE — 6370000000 HC RX 637 (ALT 250 FOR IP): Performed by: UROLOGY

## 2021-05-14 PROCEDURE — 2500000003 HC RX 250 WO HCPCS: Performed by: UROLOGY

## 2021-05-14 PROCEDURE — 97162 PT EVAL MOD COMPLEX 30 MIN: CPT

## 2021-05-14 PROCEDURE — 97116 GAIT TRAINING THERAPY: CPT

## 2021-05-14 PROCEDURE — 6360000002 HC RX W HCPCS: Performed by: NURSE PRACTITIONER

## 2021-05-14 PROCEDURE — 80048 BASIC METABOLIC PNL TOTAL CA: CPT

## 2021-05-14 PROCEDURE — 2580000003 HC RX 258: Performed by: UROLOGY

## 2021-05-14 PROCEDURE — 97530 THERAPEUTIC ACTIVITIES: CPT

## 2021-05-14 PROCEDURE — 83735 ASSAY OF MAGNESIUM: CPT

## 2021-05-14 RX ORDER — POTASSIUM CHLORIDE 20 MEQ/1
40 TABLET, EXTENDED RELEASE ORAL ONCE
Status: COMPLETED | OUTPATIENT
Start: 2021-05-14 | End: 2021-05-14

## 2021-05-14 RX ADMIN — CEFTRIAXONE SODIUM 1000 MG: 1 INJECTION, POWDER, FOR SOLUTION INTRAMUSCULAR; INTRAVENOUS at 16:44

## 2021-05-14 RX ADMIN — METOPROLOL TARTRATE 5 MG: 5 INJECTION INTRAVENOUS at 04:16

## 2021-05-14 RX ADMIN — FAMOTIDINE 20 MG: 20 TABLET ORAL at 08:26

## 2021-05-14 RX ADMIN — HYDRALAZINE HYDROCHLORIDE 10 MG: 20 INJECTION INTRAMUSCULAR; INTRAVENOUS at 08:27

## 2021-05-14 RX ADMIN — POTASSIUM CHLORIDE 40 MEQ: 1500 TABLET, EXTENDED RELEASE ORAL at 08:25

## 2021-05-14 RX ADMIN — METOPROLOL TARTRATE 5 MG: 5 INJECTION INTRAVENOUS at 12:44

## 2021-05-14 RX ADMIN — SENNOSIDES 17.2 MG: 8.6 TABLET, FILM COATED ORAL at 08:26

## 2021-05-14 RX ADMIN — ALPRAZOLAM 0.5 MG: 0.25 TABLET ORAL at 20:40

## 2021-05-14 ASSESSMENT — PAIN SCALES - GENERAL: PAINLEVEL_OUTOF10: 5

## 2021-05-14 NOTE — PROGRESS NOTES
[facility]  Office: [address], DO, Zoë Moncada, DO, Mark Henning, DO, Reza Yessenia Blood, DO, Shefali Hagen MD, Ada Mae MD, Tatiana Cheung MD, Nel Martinez MD, Melania Dunn MD, Sean Mejía MD, Nay Moreno MD, Fermin Saldaña MD, Owen Hancock, DO, Shea Benitez MD, Antoinette Castro, DO, Leonora Jeter MD,  Felicia Whittaker DO, Sarthak Solis MD, Christian Velasco MD, Margie Fernandez MD, Amanda Aquino MD, Arun Canales, [facility], [address], [facility], Dario Sun, CNP, Steve Deutsch, CNS, Alfredo Osgood, CNP, Rowena Alert, CNP, Thu Crawford, CNP, Tom Marcelino, CNP, Tomasa Gallo, CNP, Jacqueline Zambrano PA-C, Kenia Nunez, [facility], Kiersten Pinto, CNP, Gm Munoz, CNP, Blu Haywood, CNP, Dina Austin, CNP, Renee Avalos, [facility], Carole Clark, [facility]    Progress Note    5/14/2021    9:27 AM    Name:   Sean Mann  MRN:     [id number]     [place]:      [de-identified]   Room:   [address] [place] [address] Day:  4  Admit Date:  5/10/2021  3:02 PM    PCP:   No primary care provider on file. Code Status:  Full Code    Subjective:     C/C:   Chief Complaint   Patient presents with    Hematuria     onset this am, sent from urgent care     Interval History Status: improved. Bladder irrigation continues per urology. Patient is resting, denies any complaints of chest pain, shortness of breath, nausea vomiting, fevers or chills or other issues. Patient and family for nephrostomy tubes are on hold as his kidney function is improving. Brief History: This is a 78-year-old male that presents with new onset of gross hematuria that started in the morning of May 10. Initially presented to an urgent care and was sent to the emergency room for evaluation. Is found to have bilateral hydronephrosis as well as bladder outlet obstruction mostly related to prostatic hypertrophy.   His evidence of acute kidney injury versus chronic kidney disease at presentation. Creatinine has continued to rise and is consistent with acute kidney injury. Review of Systems:     Constitutional:  negative for chills, fevers, sweats  Respiratory:  negative for cough, dyspnea on exertion, shortness of breath, wheezing  Cardiovascular:  negative for chest pain, chest pressure/discomfort, lower extremity edema, palpitations  Gastrointestinal:  negative for abdominal pain, constipation, diarrhea, nausea, vomiting  Neurological:  negative for dizziness, headache    Medications: Allergies: Allergies   Allergen Reactions    Macrobid [Nitrofurantoin]      High intolerance - projectile diarrhea       Current Meds:   Scheduled Meds:    nicotine  1 patch Transdermal Daily    sodium chloride flush  5-40 mL Intravenous 2 times per day    famotidine  20 mg Oral Daily    cefTRIAXone (ROCEPHIN) IV  1,000 mg Intravenous Q24H     Continuous Infusions:    sodium chloride      sodium chloride 75 mL/hr at 21 0824     PRN Meds: hydrALAZINE, senna, metoprolol, oxybutynin, lidocaine, ALPRAZolam, sodium chloride flush, sodium chloride, promethazine **OR** ondansetron, magnesium hydroxide, acetaminophen **OR** acetaminophen, morphine **OR** morphine    Data:     Past Medical History:   has a past medical history of Hypertension. Social History:   reports that he has been smoking cigarettes. He has been smoking about 2.00 packs per day. He has never used smokeless tobacco. He reports that he does not drink alcohol or use drugs. Family History: History reviewed. No pertinent family history. Vitals:  BP (!) 171/71   Pulse 104   Temp 98.1 °F (36.7 °C) (Oral)   Resp 18   Ht 6' (1.829 m)   Wt 152 lb 7 oz (69.1 kg)   SpO2 97%   BMI 20.67 kg/m²   Temp (24hrs), Av.1 °F (36.7 °C), Min:97.8 °F (36.6 °C), Max:98.4 °F (36.9 °C)    No results for input(s): POCGLU in the last 72 hours. I/O (24Hr):     Intake/Output Summary (Last 24 hours) at 2021 8224  Last data filed at 5/14/2021 1267  Gross per 24 hour   Intake 4078 ml   Output 2050 ml   Net 2028 ml       Labs:  Hematology:  Recent Labs     05/12/21  0408 05/13/21  0507   WBC 10.5 12.5*   RBC 3.25* 3.05*   HGB 9.1* 8.5*   HCT 29.4* 27.2*   MCV 90.5 89.2   MCH 28.0 27.9   MCHC 31.0 31.3   RDW 13.4 13.4    249   MPV 8.2 8.4     Chemistry:  Recent Labs     05/12/21  0408 05/13/21  0507 05/14/21  0507    137 139   K 3.4* 3.3* 3.5*    108* 111*   CO2 23 18* 18*   GLUCOSE 103* 105* 111*   BUN 16 14 13   CREATININE 2.37* 2.23* 1.91*   MG 1.7 1.5* 1.9   ANIONGAP 12 11 10   LABGLOM 28* 30* 35*   GFRAA 33* 36* 43*   CALCIUM 8.5* 8.0* 8.0*   No results for input(s): PROT, LABALBU, LABA1C, Q6VBNWW, M7LZLZT, FT4, TSH, AST, ALT, LDH, GGT, ALKPHOS, LABGGT, BILITOT, BILIDIR, AMMONIA, AMYLASE, LIPASE, LACTATE, CHOL, HDL, LDLCHOLESTEROL, CHOLHDLRATIO, TRIG, VLDL, BSD49RV, PHENYTOIN, PHENYF, URICACID, POCGLU in the last 72 hours. ABG:No results found for: POCPH, PHART, PH, POCPCO2, CZB8VLI, PCO2, POCPO2, PO2ART, PO2, POCHCO3, BXO9AXP, HCO3, NBEA, PBEA, BEART, BE, THGBART, THB, BQH2QRR, SBHG0VIG, O9KPTSFP, O2SAT, FIO2  Lab Results   Component Value Date/Time    SPECIAL NOT REPORTED 05/12/2021 06:05 PM     Lab Results   Component Value Date/Time    CULTURE NO GROWTH 05/12/2021 06:05 PM       Radiology:  Ct Abdomen Pelvis Wo Contrast Additional Contrast? None    Result Date: 5/12/2021  1. Interval placement of Paige catheter and decompression of the bladder. Findings of cystitis are again demonstrated. 2.  No significant change in moderately severe bilateral hydroureteronephrosis. No perinephric stranding. Unchanged position of small layering stone in the distal left ureter. 3.  Bilateral nephrolithiasis. Ct Abdomen Pelvis Wo Contrast Additional Contrast? None    Result Date: 5/10/2021  1.   Findings compatible with bladder outlet obstruction, likely secondary to prostatomegaly, with associated bilateral moderately severe hydroureteronephrosis. Underlying cystitis is also likely present. 2.  3 mm layering nonobstructing stone in the distal left ureter. Bilateral nephrolithiasis is also present. Physical Examination:        General appearance:  alert, cooperative and no distress  Mental Status:  oriented to person, place and time and normal affect  Lungs:  clear to auscultation bilaterally, normal effort  Heart:  regular rate and rhythm, no murmur  Abdomen:  soft, nontender, nondistended, normal bowel sounds, no masses, hepatomegaly, splenomegaly  Extremities:  no edema, redness, tenderness in the calves  Skin:  no gross lesions, rashes, induration    Assessment:        Hospital Problems           Last Modified POA    * (Principal) Bilateral hydronephrosis due to bladder outlet obstruction 5/11/2021 Yes    Gross hematuria 5/11/2021 Yes    Heavy cigarette smoker (20-39 per day) 5/10/2021 Yes    Family history of stomach cancer 5/10/2021 Yes    Family history of Alzheimer's disease 5/10/2021 Yes    Anxiety about health 5/10/2021 Yes    Elevated PSA 5/11/2021 Yes    SANDEEP (acute kidney injury) ([address] 5/11/2021 Yes    BPH with obstruction/lower urinary tract symptoms 5/11/2021 Yes    Severe malnutrition ([address] 5/12/2021 Yes          Plan:        Continue bladder irrigation  Urology evaluation in progress, patient reports nephrostomy tubes on hold due to improving renal function.   Likely be discharged home with Paige  Correct potassium, oral replacement ordered for this morning  Monitor renal function, avoid nephrotoxic agents  Decrease IV fluid rate  Tobacco cessation  Nutritional supplements  GI DVT prophylaxis  Discharge planning 24 hours if no procedures planned and continued renal recovery    Aldon Najjar, DO  5/14/2021  9:27 AM

## 2021-05-14 NOTE — PLAN OF CARE
Problem: Falls - Risk of:  Goal: Will remain free from falls  Description: Will remain free from falls  Outcome: Ongoing       Patient has remained free from falls this shift. Patient is alert and oriented times four. Bed to lowest position with door open. Patient care items and call light in reach. Patient uses call light appropriately for assist. Will continue to monitor. Please see fall assessment.

## 2021-05-14 NOTE — PROGRESS NOTES
Physical Therapy    Facility/Department: [facility] PROGRESSIVE CARE  Initial Assessment    NAME: Salomón Mancia  : 1955  MRN: [id number]    Date of Service: 2021    Discharge Recommendations:  Patient would benefit from continued therapy after discharge     Due to recent hospitalization and medical condition, pt would benefit from additional therapy at time of discharge to ensure safety. Please refer to the AM-PAC score for current functional status. Assessment   Body structures, Functions, Activity limitations: Decreased functional mobility ; Decreased strength;Decreased endurance;Decreased balance;Decreased posture  Assessment: Patient will benefit from continues PT to improve gait, transfers, balance, and strength. Prognosis: Good  Decision Making: Medium Complexity  PT Education: Goals;PT Role;Plan of Care;General Safety  Patient Education: Patient and family educated to fall prevention. Patient demo good understanding. REQUIRES PT FOLLOW UP: Yes  Activity Tolerance  Activity Tolerance: Patient Tolerated treatment well       Patient Diagnosis(es): The primary encounter diagnosis was Urinary retention. A diagnosis of Hematuria, unspecified type was also pertinent to this visit. has a past medical history of Hypertension. has a past surgical history that includes hernia repair; back surgery; Tonsillectomy; and Cystoscopy (N/A, 2021). Restrictions  Restrictions/Precautions  Restrictions/Precautions: General Precautions, Fall Risk  Position Activity Restriction  Other position/activity restrictions: RUE IV, monge, clear liquids, up as roney and up with assist, alarms, continuous bladder irrigation.   Vision/Hearing        Subjective  General  Chart Reviewed: Yes  Patient assessed for rehabilitation services?: Yes  Additional Pertinent Hx: Prostata hypertrohy, Recent UTI,  Response To Previous Treatment: Not applicable  Family / Caregiver Present: Yes(Bilateral hydronephrosis, bladder obstruction, cystoscopy 5/12)  Diagnosis: Bilateral hydronephrosis, bladder obstruction, cystoscopy 5/12  Follows Commands: Within Functional Limits  General Comment  Comments: Carole Contreras, RN reports patient medically appropriate for PT. Subjective  Subjective: Patient very pleasant and agreeable. Patient's daughter reports that he shuffles some when he walk but that is his normal and he does not stand up straight due to a pinched nerve. Pain Screening  Patient Currently in Pain: Yes     Pre Treatment Pain Screening  Intervention List: Patient able to continue with treatment    Orientation  Orientation  Overall Orientation Status: Within Normal Limits  Social/Functional History  Social/Functional History  Lives With: Spouse(works 8-5)  Type of Home: House  Home Layout: One level(laundry on main)  Home Access: Stairs to enter without rails(garage entrance)  Entrance Stairs - Number of Steps: 1  Bathroom Shower/Tub: Tub/Shower unit  Bathroom Toilet: Standard(vanity close)  Bathroom Equipment: (no DME)  Home Equipment: Quad cane  Receives Help From: Family(pt states he has a supportive family-2 dtr local)  ADL Assistance: Independent  Homemaking Assistance: Needs assistance(spouse assists with IADL's; pt has been completing some laundry tasks)  Homemaking Responsibilities: Yes  Ambulation Assistance: Independent(with quad cane as needed)  Transfer Assistance: Independent  Active : Yes  Occupation: Retired  Type of occupation:   Leisure & Hobbies: work on things; play with dog  Additional Comments: Pt states he fell down onto his knees outside while checking on car. Cognition   Cognition  Arousal/Alertness: Appropriate responses to stimuli  Following Commands:  Follows all commands without difficulty  Attention Span: Appears intact  Memory: Decreased short term memory  Safety Judgement: Decreased awareness of need for assistance;Decreased awareness of need for safety  Initiation: Does not require cues  Sequencing: Does not require cues    Objective     Observation/Palpation  Posture: Fair(flexed posture)  Observation: RUE IV, monge, bladder irrigation    AROM RLE (degrees)  RLE AROM: WFL  AROM LLE (degrees)  LLE AROM : WFL  Strength RLE  Comment: 4/5  Strength LLE  Comment: 4/5  Tone RLE  RLE Tone: Normotonic  Tone LLE  LLE Tone: Normotonic  Motor Control  Gross Motor?: Exceptions  Comments: mildly slowed motor planning     Bed mobility  Rolling to Left: Modified independent  Rolling to Right: Modified independent  Supine to Sit: Modified independent  Sit to Supine: Modified independent  Scooting: Modified independent  Comment: Patient demo good safety with bed mobility, demo good awareness of lines and cords. Transfers  Sit to Stand: Supervision  Stand to sit: Supervision  Bed to Chair: Supervision  Stand Pivot Transfers: Supervision  Comment: Patient demo good awareness of lines and cords, did not assist managing lines and cords. Ambulation  Ambulation?: Yes  Ambulation 1  Surface: level tile  Device: No Device  Assistance: Stand by assistance  Quality of Gait: Patient required assist to manage lines and cords, but demo good safety and awareness of lines. Gait Deviations: Increased COLLETTE; Slow Jada;Decreased step length;Decreased step height;Decreased head and trunk rotation  Distance: 60 feet     Balance  Sitting - Static: Good  Sitting - Dynamic: Good  Standing - Static: Fair;+  Standing - Dynamic: Fair;+        Plan   Plan  Times per week: 1-2x/d, 5-6 d/wk  Current Treatment Recommendations: Strengthening, Transfer Training, Balance Training, Functional Mobility Training, Gait Training, Patient/Caregiver Education & Training, Home Exercise Program, Safety Education & Training, Endurance Training  Safety Devices  Type of devices: Nurse notified, Gait belt, All fall risk precautions in place, Call light within reach, Left in bed, Bed alarm in place    G-Code       OutComes Score AM-PAC Score  AM-PAC Inpatient Mobility Raw Score : 18 (05/14/21 1523)  AM-PAC Inpatient T-Scale Score : 43.63 (05/14/21 1523)  Mobility Inpatient CMS 0-100% Score: 46.58 (05/14/21 1523)  Mobility Inpatient CMS G-Code Modifier : CK (05/14/21 1523)          Goals  Short term goals  Time Frame for Short term goals: 12 visits  Short term goal 1: Patient will be indep with bed mobility and transfers. Short term goal 2: Patient will amb 200 feet indep. Short term goal 3: Patient will have good standing balance. Short term goal 4: Patient will tolerate 30 minutes of ther-ex and ther-act.   Patient Goals   Patient goals : Return home       Therapy Time   Individual Concurrent Group Co-treatment   Time In 1351         Time Out 1414         Minutes 23         Timed Code Treatment Minutes: Anna 63, PT

## 2021-05-14 NOTE — PROGRESS NOTES
the indwelling Paige monitor the hematuria and continue to monitor renal function    Interval Imaging Findings:    Impression: High serum PSA acute kidney injury gross hematuria with urinary retention  Patient Active Problem List   Diagnosis    Gross hematuria    Heavy cigarette smoker (20-39 per day)    Family history of stomach cancer    Family history of Alzheimer's disease    Anxiety about health    Elevated PSA    Bilateral hydronephrosis due to bladder outlet obstruction    SANDEEP (acute kidney injury) ([address]    BPH with obstruction/lower urinary tract symptoms    Severe malnutrition ([address]       Plan: Maintain indwelling Paige monitor renal function    Kylee Humphrey  4:03 PM 5/14/2021

## 2021-05-14 NOTE — PROGRESS NOTES
Biochemical Data, Fluid Status or Edema, Skin, Weight, Chewing or Swallowing     Discharge Planning:    Continue current diet, Continue Oral Nutrition Supplement         [place] Osorio  MFN, RDN, LDN  Lead Clinical Dietitian  RD Office Phone [phone]

## 2021-05-14 NOTE — PROGRESS NOTES
Patient has been unsuccessful in having a bowel movement. Tap water enema given, approximately 750 ml instilled. Patient then voided a hard formed stool. Patient tolerated well.

## 2021-05-14 NOTE — PLAN OF CARE
Problem: Falls - Risk of:  Goal: Will remain free from falls  Description: Will remain free from falls  5/13/2021 2015 by Darrion Valle RN  Outcome: Ongoing     Problem: PAIN  Goal: Patient's pain/discomfort is manageable  Outcome: Ongoing     Problem: Urinary Elimination:  Goal: Ability to achieve a balanced intake and output will improve  Description: Ability to achieve a balanced intake and output will improve  Outcome: Ongoing

## 2021-05-14 NOTE — DISCHARGE INSTR - COC
Continuity of Care Form    Patient Name: Cindy Durant   :  1955  MRN:  [id number]    Admit date:  5/10/2021  Discharge date:  5/15/21    Code Status Order: Full Code   Advance Directives:   Advance Care Flowsheet Documentation       Date/Time Healthcare Directive Type of Healthcare Directive Copy in [address] Agent's Name Healthcare Agent's Phone Number    21 1218  No, patient does not have an advance directive for healthcare treatment -- -- -- -- --    21 1735  No, patient does not have an advance directive for healthcare treatment -- -- -- -- --            Admitting Physician:  Ines Aguilar DO  PCP: No primary care provider on file. Discharging Nurse:   [address] Unit/Room#: 1001/1001-02  Discharging Unit Phone Number: [phone]    Emergency Contact:   Extended Emergency Contact Information  Primary Emergency Contact: Brisa LING  Home Phone: [phone]  Relation: Child    Past Surgical History:  Past Surgical History:   Procedure Laterality Date    BACK SURGERY      CYSTOSCOPY N/A 2021    CYSTOSCOPY BLADDER IRRIGATION CLOT EVACUATION performed by Gareth Almeida MD at [address] [place] [address]         Immunization History: There is no immunization history on file for this patient.     Active Problems:  Patient Active Problem List   Diagnosis Code    Gross hematuria R31.0    Heavy cigarette smoker (20-39 per day) F17.210    Family history of stomach cancer Z80.0    Family history of Alzheimer's disease Z82.0    Anxiety about health F41.8    Elevated PSA R97.20    Bilateral hydronephrosis due to bladder outlet obstruction N13.30    SANDEEP (acute kidney injury) ([facility] N17.9    BPH with obstruction/lower urinary tract symptoms N40.1, N13.8    Severe malnutrition ([address] E43       Isolation/Infection:   Isolation            No Isolation          Patient Infection Status       None to display            Nurse Assessment:  Last Vital Signs: BP (!) 166/87   Pulse 107   Temp 97.5 °F (36.4 °C) (Oral)   Resp 18   Ht 6' (1.829 m)   Wt 152 lb 7 oz (69.1 kg)   SpO2 100%   BMI 20.67 kg/m²     Last documented pain score (0-10 scale): Pain Level: 5  Last Weight:   Wt Readings from Last 1 Encounters:   05/14/21 152 lb 7 oz (69.1 kg)     Mental Status:  oriented and alert    IV Access:  - None    Nursing Mobility/ADLs:  Walking   Independent  Transfer  Independent  Bathing  Independent  Dressing  Independent  [address]  Med Delivery   whole    Wound Care Documentation and Therapy:        Elimination:  Continence:   · Bowel: Yes  · Bladder: Yes  Urinary Catheter: Insertion Date: 5/12 -22 [address] monge catheter. Irrigate monge catheter as needed at home. Monge care BID and as needed. Colostomy/Ileostomy/Ileal Conduit: No       Date of Last BM: 5/14/2021    Intake/Output Summary (Last 24 hours) at 5/14/2021 1311  Last data filed at 5/14/2021 0622  Gross per 24 hour   Intake 4078 ml   Output 2050 ml   Net 2028 ml     I/O last 3 completed shifts: In: 4078 [I.V.:4078]  Out: 2400 [Urine:2400]    Safety Concerns:     None    Impairments/Disabilities:      None    Nutrition Therapy:  Current Nutrition Therapy:   - Oral Diet:  General    Routes of Feeding: Oral  Liquids: No Restrictions  Daily Fluid Restriction: no  Last Modified Barium Swallow with Video (Video Swallowing Test): not done    Treatments at the Time of Hospital Discharge:   Respiratory Treatments: na   Oxygen Therapy:  is not on home oxygen therapy. Ventilator:    - No ventilator support    Rehab Therapies:    Weight Bearing Status/Restrictions: No weight bearing restirctions  Other Medical Equipment (for information only, NOT a DME order): Monge   Other Treatments:   1. Skilled RN assessment.  Monge     Patient's personal belongings (please select all that are sent with patient):  None    RN SIGNATURE: CASE MANAGEMENT/SOCIAL WORK SECTION    Inpatient Status Date: 5/10    Readmission Risk Assessment Score:  Readmission Risk              Risk of Unplanned Readmission:        12           Discharging to Facility/ Agency   · Name: [facility]   · Phone: [phone]  · Fax: [phone]      / signature: Electronically signed by Xiomara Cheng RN on 5/14/21 at 1:13 PM EDT    PHYSICIAN SECTION    Prognosis: Fair    Condition at Discharge: Stable    Rehab Potential (if transferring to Rehab): Fair    Recommended Labs or Other Treatments After Discharge: [facility] on the 31st.  [facility]    Physician Certification: I certify the above information and transfer of Sammie Boo  is necessary for the continuing treatment of the diagnosis listed and that he requires Home Care for greater 30 days.      Update Admission H&P: No change in H&P    PHYSICIAN SIGNATURE:  Electronically signed by Nathaniel Barrett DO on 5/15/21 at 10:17 AM EDT

## 2021-05-14 NOTE — PLAN OF CARE
Nutrition Problem #1: Severe malnutrition  Intervention: Food and/or Nutrient Delivery: Continue Current Diet, Start Oral Nutrition Supplement  Nutritional Goals: Diet advancement with PO intake >50% when diet advances

## 2021-05-14 NOTE — PROGRESS NOTES
Occupational Therapy  Facility/Department: [facility] PROGRESSIVE CARE  Daily Treatment Note  NAME: Lavern Alexander  : 1955  MRN: [id number]    RHEA Robles reports patient is medically stable for therapy treatment this date. Chart reviewed prior to treatment and patient is agreeable for therapy. All lines intact and patient positioned comfortably at end of treatment. All patient needs addressed prior to ending therapy session. Due to recent hospitalization and medical condition, pt would benefit from additional therapy at time of discharge to ensure safety. Please refer to the AM-PAC score for current functional status. Date of Service: 2021    Discharge Recommendations:  Patient would benefit from continued therapy after discharge  OT Equipment Recommendations  Walker: Rolling  ADL Assistive Devices: Toileting - 3-in-1 Commode;Grab Bars - shower; Reacher;Long-handled Shoe Horn;Long-handled Sponge;Emergency Alert System    Assessment   Performance deficits / Impairments: Decreased functional mobility ; Decreased safe awareness;Decreased balance;Decreased ADL status; Decreased coordination;Decreased endurance;Decreased high-level IADLs;Decreased sensation;Decreased posture;Decreased cognition;Decreased vision/visual deficit  Assessment: Skilled OT is indicated to increase act roney, balance and I/safety in function to return home as able and with assist as needed. Prognosis: Fair  OT Education: OT Role;Plan of Care;Family Education;Transfer Training;Energy Conservation  Patient Education: pursed lip breathing, safety in function, call light use/fall prevention, postural control, recommendation for continued therapy, benefits of being up OOB as able and use of AD  REQUIRES OT FOLLOW UP: Yes  Activity Tolerance  Activity Tolerance: Patient limited by fatigue;Patient limited by pain; Patient Tolerated treatment well  Activity Tolerance: fair minus  Safety Devices  Safety Devices in place: Yes  Type of devices: Call light within reach; Patient at risk for falls;Gait belt;Nurse notified; Chair alarm in place; Left in chair(spouse in with pt up on exit)         Patient Diagnosis(es): The primary encounter diagnosis was Urinary retention. A diagnosis of Hematuria, unspecified type was also pertinent to this visit. has a past medical history of Hypertension. has a past surgical history that includes hernia repair; back surgery; Tonsillectomy; and Cystoscopy (N/A, 5/12/2021). Restrictions  Restrictions/Precautions  Restrictions/Precautions: Fall Risk  Position Activity Restriction  Other position/activity restrictions: RUE IV, monge, clear liquids, up as roney and up with assist, alarms  Subjective   General  Chart Reviewed: Yes  Patient assessed for rehabilitation services?: Yes  Family / Caregiver Present: Yes(spouse)  Pre Treatment Pain Screening  Intervention List: Nurse/Physician notified  Comments / Details: Pt c/o abd pain and rated 5/10. Vital Signs  Patient Currently in Pain: Yes   Orientation  Orientation  Overall Orientation Status: Within Functional Limits  Objective    ADL  Grooming: Setup;Stand by assistance(seated for washing face and B hands)  UE Bathing: Setup;Stand by assistance(for sponge bath seated EOB)  LE Bathing: Setup;Contact guard assistance(to stand with RW and wash adilia area; verbal instruction to keep 1 UE support on AD to increase safety and pt with fair carry over)  UE Dressing: Setup;Minimal assistance(donning clean hosp gown)  LE Dressing: Setup;Minimal assistance(pt needed assist threading monge throught pull-ups as well as threading RLE/pt was able to thread LLE with increased time and CG for balance with clothing mgt up standing with RW.   Pt was able to leila clean pair of B  socks seated EOB with set up/SBA and crossing BLE's)  Toileting: Dependent/Total(monge)  Additional Comments: Pt was edu to pace self and rest/take breaks as needed and sitting vs standing as able for EC/WS tech. Pt with fair carry over. Balance  Sitting Balance: Stand by assistance  Standing Balance: Contact guard assistance(with RW)  Standing Balance  Time: stand roney 1-2 min with RW for functional tasks  Functional Mobility  Functional - Mobility Device: Rolling Walker  Activity: (R side of bed and around to chair on L)  Assist Level: Contact guard assistance  Functional Mobility Comments: MAX-MOD verbal instruction/tactile assist for upright posture, RW safety/mgt and keeping AD on floor and with pt AAT's, scanning room, pacing, as well as awareness/assist with lines to increase overall safety/reduce falls. Toilet Transfers  Toilet Transfers Comments: N/T and pt with monge  Bed mobility  Supine to Sit: Stand by assistance;Supervision  Sit to Supine: Unable to assess(pt was agreeable to sit up in chair after edu on the benefits of being up OOB as able)  Comment: MIN cues needed for pursed lip breathing vs holding breath and pt with good use of rails. Transfers  Stand Step Transfers: Contact guard assistance(with RW)  Sit to stand: Contact guard assistance  Stand to sit: Contact guard assistance  Transfer Comments: MAX-MOD verbal instruction/tactile assist for B hand placement, upright posture, RW safety/mgt and keeping AD on floor and with AAT's, scanning, pacing, squaring self/AD up to surface prior to sitting and controlled as well as awareness/assist with lines to increase overall safety. Cognition  Overall Cognitive Status: Exceptions  Arousal/Alertness: Appropriate responses to stimuli  Following Commands:  Follows all commands without difficulty  Attention Span: Appears intact  Memory: Decreased short term memory  Safety Judgement: Decreased awareness of need for assistance;Decreased awareness of need for safety  Problem Solving: Assistance required to correct errors made;Assistance required to identify errors made;Decreased awareness of errors;Assistance required to generate solutions;Assistance required to implement solutions  Insights: Decreased awareness of deficits  Initiation: Requires cues for some  Sequencing: Requires cues for some  Cognition Comment: *continue to assess as needed                                         Plan   Plan  Times per week: 4-5x/week 1x/day as roney  Current Treatment Recommendations: Strengthening, Balance Training, Functional Mobility Training, Safety Education & Training, Positioning, Endurance Training, Neuromuscular Re-education, Patient/Caregiver Education & Training, Self-Care / ADL, Equipment Evaluation, Education, & procurement, Home Management Training, Pain Management, Cognitive/Perceptual Training                                               AM-PAC Score    17        Goals  Short term goals  Time Frame for Short term goals: by discharge, pt to demo  Short term goal 1: bed mob tasks with use of rail as needed to MOD I. Short term goal 2: increase in B hand strength by a 1/2 grade and be I with BUE HEP with use of handouts as needed to maintain strength. Short term goal 3: total body ADL tasks with use of AD/AE and grab bar as needed to SUP. Short term goal 4: ADL transfers and functional mob with AD to SUP level. Short term goal 5: standing to SUP with AD roney > 6 mins as able to reduce falls in function. Long term goals  Long term goal 1: Pt/family to be I with EC/WS and fall prevention tech as well as DME/AD and AE recommendations with use of handouts. Patient Goals   Patient goals : Get me home soon!        Therapy Time   Individual Concurrent Group Co-treatment   Time In [address] [place] [address]         Time Out 1018         Minutes [address] [place], [place]

## 2021-05-15 VITALS
WEIGHT: 149.4 LBS | DIASTOLIC BLOOD PRESSURE: 80 MMHG | OXYGEN SATURATION: 99 % | TEMPERATURE: 97.3 F | HEIGHT: 72 IN | SYSTOLIC BLOOD PRESSURE: 137 MMHG | HEART RATE: 115 BPM | BODY MASS INDEX: 20.24 KG/M2 | RESPIRATION RATE: 16 BRPM

## 2021-05-15 LAB
ANION GAP SERPL CALCULATED.3IONS-SCNC: 10 MMOL/L (ref 9–17)
BUN BLDV-MCNC: 12 MG/DL (ref 8–23)
BUN/CREAT BLD: 7 (ref 9–20)
CALCIUM SERPL-MCNC: 8.6 MG/DL (ref 8.6–10.4)
CHLORIDE BLD-SCNC: 111 MMOL/L (ref 98–107)
CO2: 19 MMOL/L (ref 20–31)
CREAT SERPL-MCNC: 1.83 MG/DL (ref 0.7–1.2)
GFR AFRICAN AMERICAN: 45 ML/MIN
GFR NON-AFRICAN AMERICAN: 37 ML/MIN
GFR SERPL CREATININE-BSD FRML MDRD: ABNORMAL ML/MIN/{1.73_M2}
GFR SERPL CREATININE-BSD FRML MDRD: ABNORMAL ML/MIN/{1.73_M2}
GLUCOSE BLD-MCNC: 103 MG/DL (ref 70–99)
HCT VFR BLD CALC: 26.2 % (ref 40.7–50.3)
HEMOGLOBIN: 8.2 G/DL (ref 13–17)
LACTIC ACID, SEPSIS WHOLE BLOOD: NORMAL MMOL/L (ref 0.5–1.9)
LACTIC ACID, SEPSIS: 0.6 MMOL/L (ref 0.5–1.9)
MAGNESIUM: 1.8 MG/DL (ref 1.6–2.6)
MCH RBC QN AUTO: 27.7 PG (ref 25.2–33.5)
MCHC RBC AUTO-ENTMCNC: 31.3 G/DL (ref 28.4–34.8)
MCV RBC AUTO: 88.5 FL (ref 82.6–102.9)
NRBC AUTOMATED: 0 PER 100 WBC
PDW BLD-RTO: 13.5 % (ref 11.8–14.4)
PLATELET # BLD: 314 K/UL (ref 138–453)
PMV BLD AUTO: 8.4 FL (ref 8.1–13.5)
POTASSIUM SERPL-SCNC: 3.5 MMOL/L (ref 3.7–5.3)
PROSTATE SPECIFIC ANTIGEN: 54.65 UG/L
RBC # BLD: 2.96 M/UL (ref 4.21–5.77)
SODIUM BLD-SCNC: 140 MMOL/L (ref 135–144)
WBC # BLD: 10.3 K/UL (ref 3.5–11.3)

## 2021-05-15 PROCEDURE — 36415 COLL VENOUS BLD VENIPUNCTURE: CPT

## 2021-05-15 PROCEDURE — 6370000000 HC RX 637 (ALT 250 FOR IP): Performed by: UROLOGY

## 2021-05-15 PROCEDURE — 85027 COMPLETE CBC AUTOMATED: CPT

## 2021-05-15 PROCEDURE — 84153 ASSAY OF PSA TOTAL: CPT

## 2021-05-15 PROCEDURE — 80048 BASIC METABOLIC PNL TOTAL CA: CPT

## 2021-05-15 PROCEDURE — 87040 BLOOD CULTURE FOR BACTERIA: CPT

## 2021-05-15 PROCEDURE — 83735 ASSAY OF MAGNESIUM: CPT

## 2021-05-15 PROCEDURE — 83605 ASSAY OF LACTIC ACID: CPT

## 2021-05-15 PROCEDURE — 99232 SBSQ HOSP IP/OBS MODERATE 35: CPT | Performed by: INTERNAL MEDICINE

## 2021-05-15 PROCEDURE — 2580000003 HC RX 258: Performed by: UROLOGY

## 2021-05-15 PROCEDURE — 6360000002 HC RX W HCPCS: Performed by: NURSE PRACTITIONER

## 2021-05-15 PROCEDURE — 6370000000 HC RX 637 (ALT 250 FOR IP): Performed by: INTERNAL MEDICINE

## 2021-05-15 RX ORDER — CEPHALEXIN 500 MG/1
500 CAPSULE ORAL 2 TIMES DAILY
Qty: 14 CAPSULE | Refills: 0 | Status: SHIPPED | OUTPATIENT
Start: 2021-05-15 | End: 2021-05-22

## 2021-05-15 RX ORDER — POTASSIUM CHLORIDE 20 MEQ/1
40 TABLET, EXTENDED RELEASE ORAL ONCE
Status: COMPLETED | OUTPATIENT
Start: 2021-05-15 | End: 2021-05-15

## 2021-05-15 RX ORDER — OXYBUTYNIN CHLORIDE 5 MG/1
5 TABLET ORAL 3 TIMES DAILY PRN
Qty: 90 TABLET | Refills: 3 | Status: SHIPPED | OUTPATIENT
Start: 2021-05-15

## 2021-05-15 RX ADMIN — FAMOTIDINE 20 MG: 20 TABLET ORAL at 08:17

## 2021-05-15 RX ADMIN — SODIUM CHLORIDE, PRESERVATIVE FREE 10 ML: 5 INJECTION INTRAVENOUS at 08:17

## 2021-05-15 RX ADMIN — OXYBUTYNIN CHLORIDE 5 MG: 5 TABLET ORAL at 09:01

## 2021-05-15 RX ADMIN — HYDRALAZINE HYDROCHLORIDE 10 MG: 20 INJECTION INTRAMUSCULAR; INTRAVENOUS at 04:40

## 2021-05-15 RX ADMIN — POTASSIUM CHLORIDE 40 MEQ: 1500 TABLET, EXTENDED RELEASE ORAL at 11:48

## 2021-05-15 NOTE — PROGRESS NOTES
[facility]  Office: [address], DO, Trevor Mederos, DO, Augusta Sweeney, DO, Elly Gus Garzon, DO, Rey Solis MD, Yasmin Lange MD, Mary Serrato MD, Alise Rangel MD, Cecile Morris MD, Kenia Linares MD, Anushka Duval MD, Anne Barbosa MD, Dipika Lynne, DO, Lissy Hidalgo MD, Gage Carroll, DO, Aster Shah MD,  Tadeo Palmer, DO, Romana Begin, MD, Tai Garcia MD, Heike Bagley MD, Rae Cortes MD, Milena Odalis, [facility], [address], [facility], [facility], CNP, Alexis Pedraza, CNS, Paul Kendall, CNP, Destiny Singh, CNP, Kassidy Jhaveri, CNP, Joby Rodríguez, CNP, Nadeem Mchugh, CNP, Anne Shields PA-C, Marshal Sheikh, [facility], [address], CNP, Brittany Samuel, CNP, Natalie Boss, CNP, Daniel Singleton, CNP, Betzy [facility], CNP, Fabian [facility]    Progress Note    5/15/2021    9:02 AM    Name:   Cindy Durant  MRN:     [id number]     [place]:      [de-identified]   Room:   [address] [place] [address] Day:  5  Admit Date:  5/10/2021  3:02 PM    PCP:   No primary care provider on file. Code Status:  Full Code    Subjective:     C/C:   Chief Complaint   Patient presents with    Hematuria     onset this am, sent from urgent care     Interval History Status: improved. Patient denies any complaints of chest pain, shortness of breath, nausea or vomiting, fevers or chills. Has occasional bladder spasm with the bladder irrigation, hematuria occurs after spasms. Some anxiety due to disturbed sleep in the hospital.    Brief History:      This is a 76-year-old male that presents with new onset of gross hematuria that started in the morning of May 10.  Initially presented to an urgent care and was sent to the emergency room for evaluation. Rosi Head found to have bilateral hydronephrosis as well as bladder outlet obstruction mostly related to prostatic hypertrophy.  His evidence of acute kidney injury versus chronic kidney disease at presentation.  Creatinine has continued to rise and is consistent with acute kidney injury. Review of Systems:     Constitutional:  negative for chills, fevers, sweats  Respiratory:  negative for cough, dyspnea on exertion, shortness of breath, wheezing  Cardiovascular:  negative for chest pain, chest pressure/discomfort, lower extremity edema, palpitations  Gastrointestinal:  negative for abdominal pain, constipation, diarrhea, nausea, vomiting  Neurological:  negative for dizziness, headache    Medications: Allergies: Allergies   Allergen Reactions    Macrobid [Nitrofurantoin]      High intolerance - projectile diarrhea       Current Meds:   Scheduled Meds:    nicotine  1 patch Transdermal Daily    sodium chloride flush  5-40 mL Intravenous 2 times per day    famotidine  20 mg Oral Daily    cefTRIAXone (ROCEPHIN) IV  1,000 mg Intravenous Q24H     Continuous Infusions:    sodium chloride      sodium chloride 75 mL/hr at 21 0824     PRN Meds: hydrALAZINE, senna, metoprolol, oxybutynin, lidocaine, ALPRAZolam, sodium chloride flush, sodium chloride, promethazine **OR** ondansetron, magnesium hydroxide, acetaminophen **OR** acetaminophen, morphine **OR** morphine    Data:     Past Medical History:   has a past medical history of Hypertension. Social History:   reports that he has been smoking cigarettes. He has been smoking about 2.00 packs per day. He has never used smokeless tobacco. He reports that he does not drink alcohol and does not use drugs. Family History: History reviewed. No pertinent family history. Vitals:  /83   Pulse 113   Temp 98.1 °F (36.7 °C) (Oral)   Resp 16   Ht 6' (1.829 m)   Wt 149 lb 6.4 oz (67.8 kg)   SpO2 97%   BMI 20.26 kg/m²   Temp (24hrs), Av.8 °F (36.6 °C), Min:96.7 °F (35.9 °C), Max:98.4 °F (36.9 °C)    No results for input(s): POCGLU in the last 72 hours. I/O (24Hr):     Intake/Output Summary (Last 24 hours) at 5/15/2021 0902  Last data filed at 5/15/2021 6814  Gross per 24 hour   Intake 2622 ml   Output 5085 ml   Net -2463 ml       Labs:  Hematology:  Recent Labs     05/13/21  0507 05/15/21  0613   WBC 12.5* 10.3   RBC 3.05* 2.96*   HGB 8.5* 8.2*   HCT 27.2* 26.2*   MCV 89.2 88.5   MCH 27.9 27.7   MCHC 31.3 31.3   RDW 13.4 13.5    314   MPV 8.4 8.4     Chemistry:  Recent Labs     05/13/21  0507 05/14/21  0507 05/15/21  0415    139 140   K 3.3* 3.5* 3.5*   * 111* 111*   CO2 18* 18* 19*   GLUCOSE 105* 111* 103*   BUN 14 13 12   CREATININE 2.23* 1.91* 1.83*   MG 1.5* 1.9 1.8   ANIONGAP 11 10 10   LABGLOM 30* 35* 37*   GFRAA 36* 43* 45*   CALCIUM 8.0* 8.0* 8.6   No results for input(s): PROT, LABALBU, LABA1C, W2EKOMC, J5OHKDP, FT4, TSH, AST, ALT, LDH, GGT, ALKPHOS, LABGGT, BILITOT, BILIDIR, AMMONIA, AMYLASE, LIPASE, LACTATE, CHOL, HDL, LDLCHOLESTEROL, CHOLHDLRATIO, TRIG, VLDL, KXK84WH, PHENYTOIN, PHENYF, URICACID, POCGLU in the last 72 hours. ABG:No results found for: POCPH, PHART, PH, POCPCO2, ADD3NNK, PCO2, POCPO2, PO2ART, PO2, POCHCO3, EJP5KSW, HCO3, NBEA, PBEA, BEART, BE, THGBART, THB, LHX0KUG, VPBS4DZS, I0RPSAVB, O2SAT, FIO2  Lab Results   Component Value Date/Time    SPECIAL NOT REPORTED 05/12/2021 06:05 PM     Lab Results   Component Value Date/Time    CULTURE NO GROWTH 05/12/2021 06:05 PM       Radiology:  CT ABDOMEN PELVIS WO CONTRAST Additional Contrast? None    Result Date: 5/12/2021  1. Interval placement of Paige catheter and decompression of the bladder. Findings of cystitis are again demonstrated. 2.  No significant change in moderately severe bilateral hydroureteronephrosis. No perinephric stranding. Unchanged position of small layering stone in the distal left ureter. 3.  Bilateral nephrolithiasis. CT ABDOMEN PELVIS WO CONTRAST Additional Contrast? None    Result Date: 5/10/2021  1.   Findings compatible with bladder outlet obstruction, likely secondary to prostatomegaly, with associated bilateral moderately severe hydroureteronephrosis. Underlying cystitis is also likely present. 2.  3 mm layering nonobstructing stone in the distal left ureter. Bilateral nephrolithiasis is also present. Physical Examination:        General appearance:  alert, cooperative and no distress  Mental Status:  oriented to person, place and time and normal affect  Lungs:  clear to auscultation bilaterally, normal effort  Heart:  regular rate and rhythm, no murmur  Abdomen:  soft, nontender, nondistended, normal bowel sounds, no masses, hepatomegaly, splenomegaly  Extremities:  no edema, redness, tenderness in the calves  Skin:  no gross lesions, rashes, induration    Assessment:        Hospital Problems         Last Modified POA    * (Principal) Bilateral hydronephrosis due to bladder outlet obstruction 5/11/2021 Yes    Gross hematuria 5/11/2021 Yes    Heavy cigarette smoker (20-39 per day) 5/10/2021 Yes    Family history of stomach cancer 5/10/2021 Yes    Family history of Alzheimer's disease 5/10/2021 Yes    Anxiety about health 5/10/2021 Yes    Elevated PSA 5/11/2021 Yes    SANDEEP (acute kidney injury) ([address] 5/11/2021 Yes    BPH with obstruction/lower urinary tract symptoms 5/11/2021 Yes    Severe malnutrition ([address] 5/12/2021 Yes          Plan:        Continue Paige decompression, to be discharged with Paige per urology recommendations  Avoid nephrotoxic agents  Activity as tolerated  Nutritional supplements  Tobacco cessation  Discharge planning with close outpatient urology follow-up.     Arpita Hodge DO  5/15/2021  9:02 AM

## 2021-05-15 NOTE — PROGRESS NOTES
Dr. Rubina Hylton spoke with writer, patient and family via telephone this a.m. for update. He gave discharge instructions to daughter Grant Napoles. Okay to discharge from urology standpoint today. Patient did mention if we didn't discharge him today then he would sign himself out. Patient very eager to go home. Will educate patient and family on how to irrigate monge catheter when needed at home per Dr. Rubina Hylton, okay to discontinue CBI now. Also see order for PSA, no need to wait for results before discharging. F/U in 3 weeks.

## 2021-05-15 NOTE — DISCHARGE SUMMARY
[facility]  Office: [address], DO, Martha Mercado, DO, Emma Sen, DO, Tyrell Garzon, DO, Jade Quijano MD, Fatmata Yin MD, Timbo Alejandra MD, Trudy Hunter MD, Margoth Hdez MD, Virgilio Evans MD, Augustus Mead MD, Isabel Lim MD, Sandi Gentile DO, Diane Leon MD, Roya Spicer DO, Satnam Marsh MD,  Angie Elmore DO, Tara White MD, Hardeep Heath MD, Jericho Thakur MD, Otoniel Alston MD, Evgeny Los, [facility], [facility], [facility], Harsh Timmonsa, CNP, Riley Mathews, CNS, Bull Miranda, CNP, Juan A Plascencia, CNP, Nixon Santoyos, CNP, [employer], CNP, Brandan Rajput, CNP, Faith Rodriguez PA-C, Vandana Restrepo, [facility], Megan Morales, CNP, Hermilo Spencer, CNP, Reny Estrada, CNP, Aura Ahn, CNP, Justine Mayorga, [facility], Antonella Llamas, [facility]    Discharge Summary     Patient ID: Robert Cross  :  1955   MRN: [id number]     ACCOUNT:  [de-identified]   Patient's PCP: No primary care provider on file. Admit Date: 5/10/2021   Discharge Date: 5/15/2021     Length of Stay: 5  Code Status:  Full Code  Admitting Physician: Ryan Khan DO  Discharge Physician: Ryan Khan DO     Active Discharge Diagnoses:     Hospital Problem Lists:  Principal Problem:    Bilateral hydronephrosis due to bladder outlet obstruction  Active Problems:    Gross hematuria    Heavy cigarette smoker (20-39 per day)    Family history of stomach cancer    Family history of Alzheimer's disease    Anxiety about health    Elevated PSA    SANDEEP (acute kidney injury) ([facility]    BPH with obstruction/lower urinary tract symptoms    Severe malnutrition ([facility]  Resolved Problems:    * No resolved hospital problems. *      Admission Condition:  fair     Discharged Condition: stable    Hospital Stay:     Hospital Course:   Robert Cross is a 77 y.o. male who was admitted for the management of  Bilateral hydronephrosis , presented to 05/15/2021    GFR NOT REPORTED 05/15/2021        Radiology:  CT ABDOMEN PELVIS WO CONTRAST Additional Contrast? None    Result Date: 5/12/2021  1. Interval placement of Paige catheter and decompression of the bladder. Findings of cystitis are again demonstrated. 2.  No significant change in moderately severe bilateral hydroureteronephrosis. No perinephric stranding. Unchanged position of small layering stone in the distal left ureter. 3.  Bilateral nephrolithiasis. CT ABDOMEN PELVIS WO CONTRAST Additional Contrast? None    Result Date: 5/10/2021  1. Findings compatible with bladder outlet obstruction, likely secondary to prostatomegaly, with associated bilateral moderately severe hydroureteronephrosis. Underlying cystitis is also likely present. 2.  3 mm layering nonobstructing stone in the distal left ureter. Bilateral nephrolithiasis is also present. Consultations:    Consults:     Final Specialist Recommendations/Findings:   IP CONSULT TO UROLOGY  IP CONSULT TO SOCIAL WORK      The patient was seen and examined on day of discharge and this discharge summary is in conjunction with any daily progress note from day of discharge. Discharge plan:     Disposition: Home    Physician Follow Up: Follow-up with PCP in 1 week  Chele Vines MD  [address]   [facility]          [address] [address]  [phone]    they will provide home care        Requiring Further Evaluation/Follow Up POST HOSPITALIZATION/Incidental Findings: BMP on the 31st    Diet: regular diet    Activity: As tolerated    Instructions to Patient: Take medications as prescribed.     Discharge Medications:      Medication List      START taking these medications    cephALEXin 500 MG capsule  Commonly known as: KEFLEX  Take 1 capsule by mouth 2 times daily for 7 days     oxybutynin 5 MG tablet  Commonly known as: DITROPAN  Take 1 tablet by mouth 3 times daily as needed (bladder spasms)        CONTINUE taking these medications    CRANBERRY PO     NONFORMULARY     sennosides-docusate sodium 8.6-50 MG tablet  Commonly known as: SENOKOT-S           Where to Get Your Medications      These medications were sent to [facility] [facility] [address] [phone]  [place] [address] [id number]    Phone: [phone]   cephALEXin 500 MG capsule  oxybutynin 5 MG tablet         Discharge Procedure Orders   Basic Metabolic Panel   Standing Status: Future Standing Exp. Date: 05/15/22       Time Spent on discharge is  27 minutes in patient examination, evaluation, counseling as well as medication reconciliation, prescriptions for required medications, discharge plan and follow up. Electronically signed by   Jil Bocanegra DO  5/15/2021  10:13 AM      Thank you Dr. Gene Garcia primary care provider on file. for the opportunity to be involved in this patient's care.

## 2021-05-16 LAB
CULTURE: NORMAL
CULTURE: NORMAL
Lab: NORMAL
Lab: NORMAL
SPECIMEN DESCRIPTION: NORMAL
SPECIMEN DESCRIPTION: NORMAL

## 2021-05-18 LAB — UROTHELIAL CANCER DETECTION: NORMAL

## 2021-06-08 ENCOUNTER — HOSPITAL ENCOUNTER (EMERGENCY)
Age: 66
Discharge: HOME | End: 2021-06-08
Attending: EMERGENCY MEDICINE
Payer: MEDICARE

## 2021-06-08 VITALS
HEART RATE: 104 BPM | BODY MASS INDEX: 19.63 KG/M2 | SYSTOLIC BLOOD PRESSURE: 153 MMHG | WEIGHT: 144.9 LBS | TEMPERATURE: 98.6 F | DIASTOLIC BLOOD PRESSURE: 115 MMHG | RESPIRATION RATE: 16 BRPM | OXYGEN SATURATION: 99 % | HEIGHT: 72 IN

## 2021-06-08 DIAGNOSIS — R33.9 URINARY RETENTION: Primary | ICD-10-CM

## 2021-06-08 PROCEDURE — 87077 CULTURE AEROBIC IDENTIFY: CPT

## 2021-06-08 PROCEDURE — 87086 URINE CULTURE/COLONY COUNT: CPT

## 2021-06-08 PROCEDURE — 99283 EMERGENCY DEPT VISIT LOW MDM: CPT

## 2021-06-08 PROCEDURE — 6370000000 HC RX 637 (ALT 250 FOR IP): Performed by: PHYSICIAN ASSISTANT

## 2021-06-08 PROCEDURE — 87186 SC STD MICRODIL/AGAR DIL: CPT

## 2021-06-08 RX ORDER — CEPHALEXIN 500 MG/1
500 CAPSULE ORAL DAILY
Qty: 14 CAPSULE | Refills: 0 | Status: SHIPPED | OUTPATIENT
Start: 2021-06-08 | End: 2021-06-22

## 2021-06-08 RX ORDER — LIDOCAINE HYDROCHLORIDE 20 MG/ML
5 JELLY TOPICAL ONCE
Status: COMPLETED | OUTPATIENT
Start: 2021-06-08 | End: 2021-06-08

## 2021-06-08 RX ADMIN — LIDOCAINE HYDROCHLORIDE 5 ML: 20 JELLY TOPICAL at 19:19

## 2021-06-08 ASSESSMENT — PAIN SCALES - GENERAL: PAINLEVEL_OUTOF10: 2

## 2021-06-08 NOTE — ED PROVIDER NOTES
[address] [place] [address] ED  [facility]      Pt Name: Luis Burden  MRN: [id number]  [place] 1955  Date ofevaluation: 6/8/2021  Provider: Claude Elkins PA-C    CHIEF COMPLAINT       Chief Complaint   Patient presents with    Other     needs cath inserted, removed in urology office today         HISTORY OF PRESENT ILLNESS  (Location/Symptom, Timing/Onset, Context/Setting, Quality, Duration, Modifying Factors, Severity.)   Luis Burden is a 77 y.o. male who presents to the emergency department with urinary retention. Patient had a catheter placed to the bladder for over 30 days. It was moved by his urologist dr. Franco Arriola around 11 AM today. Patient has been unable to urinate despite drinking multiple bottles of water today. Patient denies any chest pain, shortness of breath, dysuria. Denies any abdominal pain. Symptom described as moderate constant. He was instructed by his urologist to come to ER for catheter reinsertion. Nursing Notes were reviewed. ALLERGIES     Macrobid [nitrofurantoin]    CURRENT MEDICATIONS       Previous Medications    CRANBERRY PO    Take by mouth daily 3 tabs daily    NONFORMULARY    superbeta prostate BID    OXYBUTYNIN (DITROPAN) 5 MG TABLET    Take 1 tablet by mouth 3 times daily as needed (bladder spasms)    SENNOSIDES-DOCUSATE SODIUM (SENOKOT-S) 8.6-50 MG TABLET    Take 1 tablet by mouth daily       PAST MEDICAL HISTORY         Diagnosis Date    Hypertension        SURGICAL HISTORY           Procedure Laterality Date    BACK SURGERY      CYSTOSCOPY N/A 5/12/2021    CYSTOSCOPY BLADDER IRRIGATION CLOT EVACUATION performed by Marissa Baltazar MD at [address] [address] [place]     History reviewed. No pertinent family history. No family status information on file. SOCIAL HISTORY      reports that he has been smoking cigarettes. He has been smoking about 2.00 packs per day.  He has never used smokeless tobacco. He reports that he does not drink alcohol and does not use drugs. REVIEW OFSYSTEMS    (2-9 systems for level 4, 10 or more for level 5)   Review of Systems    Except as noted above the remainder of the review of systems was reviewed and negative. PHYSICAL EXAM    (up to 7 for level 4, 8 or more for level 5)     ED Triage Vitals [06/08/21 1809]   BP Temp Temp Source Pulse Resp SpO2 Height Weight   (!) 153/115 98.6 °F (37 °C) Oral 104 16 99 % 6' (1.829 m) 144 lb 14.4 oz (65.7 kg)      Physical Exam  Constitutional:       Appearance: He is well-developed. HENT:      Head: Normocephalic and atraumatic. Cardiovascular:      Rate and Rhythm: Normal rate and regular rhythm. Pulmonary:      Effort: Pulmonary effort is normal.      Breath sounds: Normal breath sounds. Abdominal:      General: There is no distension. Palpations: Abdomen is soft. Tenderness: There is no abdominal tenderness. Musculoskeletal:         General: Normal range of motion. Cervical back: Normal range of motion and neck supple. Skin:     General: Skin is warm. Neurological:      Mental Status: He is alert and oriented to person, place, and time. Psychiatric:         Behavior: Behavior normal.                 DIAGNOSTIC RESULTS     EKG: All EKG's are interpreted by the Emergency Department Physician who either signs or Co-signs this chart in the absence of a cardiologist.        RADIOLOGY:   Non-plain film images such as CT, Ultrasound and MRI are read by the radiologist. Plain radiographic images arevisualized and preliminarily interpreted by the emergency physician with the below findings:        Interpretation per the Radiologist below, if available at thetime of this note:          ED BEDSIDE ULTRASOUND:   Performed by ED Physician - none    LABS:  Labs Reviewed   CULTURE, URINE       All other labs were within normal range or not returned as of this dictation.     EMERGENCY DEPARTMENT COURSE and DIFFERENTIAL DIAGNOSIS/MDM:   Vitals:    Vitals:    06/08/21 1809   BP: (!) 153/115   Pulse: 104   Resp: 16   Temp: 98.6 °F (37 °C)   TempSrc: Oral   SpO2: 99%   Weight: 144 lb 14.4 oz (65.7 kg)   Height: 6' (1.829 m)     MONGE INSERTED AND IS WORKING.      7:53 PM EDT  SPOKE with dr Ang Love And he will see patient in office and we will keep monge in. He recommends to give 1 dose of antibiotic daily due to catheter being placed and replaced recently. CONSULTS:  IP CONSULT TO UROLOGY    PROCEDURES:  Procedures        FINAL IMPRESSION      1. Urinary retention          DISPOSITION/PLAN   DISPOSITION Decision To Discharge 06/08/2021 07:54:43 PM      PATIENTREFERRED TO:   No follow-up provider specified.     DISCHARGE MEDICATIONS:     New Prescriptions    CEPHALEXIN (KEFLEX) 500 MG CAPSULE    Take 1 capsule by mouth daily for 14 doses           (Please note that portions of this note were completed with a voice recognition program.  Efforts were made to edit thedictations but occasionally words are mis-transcribed.)    ALEXIS Madrigal PA-C  06/08/21 1955

## 2021-06-08 NOTE — ED PROVIDER NOTES
The patient was seen and examined by me in conjunction with the mid-level provider. I agree with his/her assessment and treatment plan. Paige catheter has been placed by nursing staff and its functioning normally.      Anish Colon MD  06/08/21 1946

## 2021-06-11 LAB
CULTURE: ABNORMAL
CULTURE: ABNORMAL
Lab: ABNORMAL
SPECIMEN DESCRIPTION: ABNORMAL

## 2021-06-14 ENCOUNTER — HOSPITAL ENCOUNTER (OUTPATIENT)
Dept: LAB | Age: 66
Setting detail: SPECIMEN
Discharge: HOME | End: 2021-06-14
Payer: MEDICARE

## 2021-06-14 DIAGNOSIS — Z01.818 PREOP TESTING: Primary | ICD-10-CM

## 2021-06-14 PROCEDURE — U0005 INFEC AGEN DETEC AMPLI PROBE: HCPCS

## 2021-06-14 PROCEDURE — U0003 INFECTIOUS AGENT DETECTION BY NUCLEIC ACID (DNA OR RNA); SEVERE ACUTE RESPIRATORY SYNDROME CORONAVIRUS 2 (SARS-COV-2) (CORONAVIRUS DISEASE [COVID-19]), AMPLIFIED PROBE TECHNIQUE, MAKING USE OF HIGH THROUGHPUT TECHNOLOGIES AS DESCRIBED BY CMS-2020-01-R: HCPCS

## 2021-06-15 LAB
SARS-COV-2: NORMAL
SARS-COV-2: NOT DETECTED
SOURCE: NORMAL

## 2021-06-18 ENCOUNTER — ANESTHESIA EVENT (OUTPATIENT)
Dept: OPERATING ROOM | Age: 66
End: 2021-06-18
Payer: MEDICARE

## 2021-06-18 ENCOUNTER — HOSPITAL ENCOUNTER (OUTPATIENT)
Age: 66
Setting detail: OUTPATIENT SURGERY
Discharge: HOME | End: 2021-06-18
Attending: UROLOGY | Admitting: UROLOGY
Payer: MEDICARE

## 2021-06-18 ENCOUNTER — ANESTHESIA (OUTPATIENT)
Dept: OPERATING ROOM | Age: 66
End: 2021-06-18
Payer: MEDICARE

## 2021-06-18 ENCOUNTER — HOSPITAL ENCOUNTER (OUTPATIENT)
Dept: ULTRASOUND IMAGING | Age: 66
Discharge: HOME | End: 2021-06-20
Attending: UROLOGY
Payer: MEDICARE

## 2021-06-18 VITALS
OXYGEN SATURATION: 97 % | RESPIRATION RATE: 19 BRPM | WEIGHT: 144 LBS | DIASTOLIC BLOOD PRESSURE: 88 MMHG | HEIGHT: 72 IN | BODY MASS INDEX: 19.5 KG/M2 | TEMPERATURE: 97.2 F | SYSTOLIC BLOOD PRESSURE: 145 MMHG | HEART RATE: 94 BPM

## 2021-06-18 VITALS — OXYGEN SATURATION: 100 % | DIASTOLIC BLOOD PRESSURE: 34 MMHG | SYSTOLIC BLOOD PRESSURE: 70 MMHG

## 2021-06-18 LAB
BILIRUBIN URINE: NEGATIVE
COLOR: YELLOW
COMMENT UA: NORMAL
GLUCOSE URINE: NEGATIVE
KETONES, URINE: NEGATIVE
LEUKOCYTE ESTERASE, URINE: NEGATIVE
NITRITE, URINE: NEGATIVE
PH UA: 5.5 (ref 5–8)
PROTEIN UA: NEGATIVE
SPECIFIC GRAVITY UA: 1.01 (ref 1–1.03)
TURBIDITY: CLEAR
URINE HGB: NEGATIVE
UROBILINOGEN, URINE: NORMAL

## 2021-06-18 PROCEDURE — 2500000003 HC RX 250 WO HCPCS: Performed by: NURSE ANESTHETIST, CERTIFIED REGISTERED

## 2021-06-18 PROCEDURE — 3700000001 HC ADD 15 MINUTES (ANESTHESIA): Performed by: UROLOGY

## 2021-06-18 PROCEDURE — 7100000010 HC PHASE II RECOVERY - FIRST 15 MIN: Performed by: UROLOGY

## 2021-06-18 PROCEDURE — 81003 URINALYSIS AUTO W/O SCOPE: CPT

## 2021-06-18 PROCEDURE — 2580000003 HC RX 258: Performed by: ANESTHESIOLOGY

## 2021-06-18 PROCEDURE — 3700000000 HC ANESTHESIA ATTENDED CARE: Performed by: UROLOGY

## 2021-06-18 PROCEDURE — 55700 US BIOPSY PROSTATE NEEDLE/PUNCH: CPT

## 2021-06-18 PROCEDURE — 2709999900 HC NON-CHARGEABLE SUPPLY: Performed by: UROLOGY

## 2021-06-18 PROCEDURE — 3600000002 HC SURGERY LEVEL 2 BASE: Performed by: UROLOGY

## 2021-06-18 PROCEDURE — 88305 TISSUE EXAM BY PATHOLOGIST: CPT

## 2021-06-18 PROCEDURE — 3600000012 HC SURGERY LEVEL 2 ADDTL 15MIN: Performed by: UROLOGY

## 2021-06-18 PROCEDURE — 6360000002 HC RX W HCPCS: Performed by: NURSE ANESTHETIST, CERTIFIED REGISTERED

## 2021-06-18 PROCEDURE — 7100000011 HC PHASE II RECOVERY - ADDTL 15 MIN: Performed by: UROLOGY

## 2021-06-18 PROCEDURE — 6370000000 HC RX 637 (ALT 250 FOR IP): Performed by: UROLOGY

## 2021-06-18 RX ORDER — SODIUM CHLORIDE, SODIUM LACTATE, POTASSIUM CHLORIDE, CALCIUM CHLORIDE 600; 310; 30; 20 MG/100ML; MG/100ML; MG/100ML; MG/100ML
INJECTION, SOLUTION INTRAVENOUS CONTINUOUS
Status: DISCONTINUED | OUTPATIENT
Start: 2021-06-18 | End: 2021-06-18 | Stop reason: HOSPADM

## 2021-06-18 RX ORDER — SODIUM CHLORIDE 0.9 % (FLUSH) 0.9 %
10 SYRINGE (ML) INJECTION EVERY 12 HOURS SCHEDULED
Status: DISCONTINUED | OUTPATIENT
Start: 2021-06-18 | End: 2021-06-18 | Stop reason: HOSPADM

## 2021-06-18 RX ORDER — PROPOFOL 10 MG/ML
INJECTION, EMULSION INTRAVENOUS PRN
Status: DISCONTINUED | OUTPATIENT
Start: 2021-06-18 | End: 2021-06-18 | Stop reason: SDUPTHER

## 2021-06-18 RX ORDER — SODIUM CHLORIDE 0.9 % (FLUSH) 0.9 %
10 SYRINGE (ML) INJECTION PRN
Status: DISCONTINUED | OUTPATIENT
Start: 2021-06-18 | End: 2021-06-18 | Stop reason: HOSPADM

## 2021-06-18 RX ORDER — LIDOCAINE HYDROCHLORIDE 20 MG/ML
INJECTION, SOLUTION EPIDURAL; INFILTRATION; INTRACAUDAL; PERINEURAL PRN
Status: DISCONTINUED | OUTPATIENT
Start: 2021-06-18 | End: 2021-06-18 | Stop reason: SDUPTHER

## 2021-06-18 RX ORDER — CEFAZOLIN SODIUM 1 G/3ML
INJECTION, POWDER, FOR SOLUTION INTRAMUSCULAR; INTRAVENOUS PRN
Status: DISCONTINUED | OUTPATIENT
Start: 2021-06-18 | End: 2021-06-18 | Stop reason: SDUPTHER

## 2021-06-18 RX ORDER — SODIUM CHLORIDE 9 MG/ML
25 INJECTION, SOLUTION INTRAVENOUS PRN
Status: DISCONTINUED | OUTPATIENT
Start: 2021-06-18 | End: 2021-06-18 | Stop reason: HOSPADM

## 2021-06-18 RX ORDER — LIDOCAINE HYDROCHLORIDE 20 MG/ML
JELLY TOPICAL PRN
Status: DISCONTINUED | OUTPATIENT
Start: 2021-06-18 | End: 2021-06-18 | Stop reason: ALTCHOICE

## 2021-06-18 RX ORDER — SODIUM CHLORIDE 9 MG/ML
INJECTION, SOLUTION INTRAVENOUS CONTINUOUS
Status: DISCONTINUED | OUTPATIENT
Start: 2021-06-18 | End: 2021-06-18 | Stop reason: HOSPADM

## 2021-06-18 RX ORDER — FENTANYL CITRATE 50 UG/ML
INJECTION, SOLUTION INTRAMUSCULAR; INTRAVENOUS PRN
Status: DISCONTINUED | OUTPATIENT
Start: 2021-06-18 | End: 2021-06-18 | Stop reason: SDUPTHER

## 2021-06-18 RX ORDER — TOBRAMYCIN SULFATE 40 MG/ML
INJECTION, SOLUTION INTRAMUSCULAR; INTRAVENOUS PRN
Status: DISCONTINUED | OUTPATIENT
Start: 2021-06-18 | End: 2021-06-18 | Stop reason: SDUPTHER

## 2021-06-18 RX ORDER — LIDOCAINE HYDROCHLORIDE 10 MG/ML
1 INJECTION, SOLUTION EPIDURAL; INFILTRATION; INTRACAUDAL; PERINEURAL
Status: DISCONTINUED | OUTPATIENT
Start: 2021-06-18 | End: 2021-06-18 | Stop reason: HOSPADM

## 2021-06-18 RX ADMIN — PROPOFOL 50 MG: 10 INJECTION, EMULSION INTRAVENOUS at 12:00

## 2021-06-18 RX ADMIN — PROPOFOL 50 MG: 10 INJECTION, EMULSION INTRAVENOUS at 11:49

## 2021-06-18 RX ADMIN — Medication 25 MCG: at 11:54

## 2021-06-18 RX ADMIN — PROPOFOL 30 MG: 10 INJECTION, EMULSION INTRAVENOUS at 11:52

## 2021-06-18 RX ADMIN — LIDOCAINE HYDROCHLORIDE 100 MG: 20 INJECTION, SOLUTION EPIDURAL; INFILTRATION; INTRACAUDAL; PERINEURAL at 11:49

## 2021-06-18 RX ADMIN — Medication 25 MCG: at 12:00

## 2021-06-18 RX ADMIN — Medication 25 MCG: at 11:49

## 2021-06-18 RX ADMIN — TOBRAMYCIN SULFATE 80 MG: 40 INJECTION, SOLUTION INTRAMUSCULAR; INTRAVENOUS at 11:59

## 2021-06-18 RX ADMIN — PROPOFOL 20 MG: 10 INJECTION, EMULSION INTRAVENOUS at 11:58

## 2021-06-18 RX ADMIN — PROPOFOL 20 MG: 10 INJECTION, EMULSION INTRAVENOUS at 11:55

## 2021-06-18 RX ADMIN — SODIUM CHLORIDE, POTASSIUM CHLORIDE, SODIUM LACTATE AND CALCIUM CHLORIDE: 600; 310; 30; 20 INJECTION, SOLUTION INTRAVENOUS at 11:01

## 2021-06-18 RX ADMIN — CEFAZOLIN 2000 MG: 1 INJECTION, POWDER, FOR SOLUTION INTRAMUSCULAR; INTRAVENOUS at 11:57

## 2021-06-18 RX ADMIN — Medication 25 MCG: at 12:07

## 2021-06-18 ASSESSMENT — PULMONARY FUNCTION TESTS
PIF_VALUE: 0

## 2021-06-18 ASSESSMENT — PAIN SCALES - GENERAL
PAINLEVEL_OUTOF10: 0

## 2021-06-18 ASSESSMENT — LIFESTYLE VARIABLES: SMOKING_STATUS: 1

## 2021-06-18 ASSESSMENT — PAIN - FUNCTIONAL ASSESSMENT: PAIN_FUNCTIONAL_ASSESSMENT: 0-10

## 2021-06-18 NOTE — H&P
History and Physical Service   [place] [id number]    HISTORY AND PHYSICAL EXAMINATION            Date of Evaluation: 6/18/2021  Patient name:  Danielle Snell  MRN:   [id number]  YOB: 1955  PCP:    No primary care provider on file. History Obtained From:     Patient, medical records    History of Present Illness: This is Danielle Snell a 77 y.o. male who presents today for a cystoscopy prostate biopsy with ultrasound by Dr. Jeannette Prasad for elevated PSA. Patient hospitalized 5/10/2021 for 5 days for gross hematuria, bilateral hydronephrosis due to bladder outlet obstruction and SANDEEP. Patient had three-way catheter placed for bladder irrigation. S/p cystoscopy on 5/12/2021 showing thickened bladder wall with hemorrhagic cystitis and enlarged prostate. Patient was discharged home with monge catheter on 5/15/2021. Patient had follow up appointment with Dr. Jeannette Prasad on 6/8/2021 where monge catheter was removed. Patient presented to [facility] Emergency Department 6/8/2021 for urinary retention per Dr. Jeannette Prasad recommendation because he was unable to urinate throughout the day despite increase fluid intake. Patient had new monge catheter placed and he was discharged home. Denies hematuria, dysuria, flank pain. Denies fever, chills, shortness of breath, cough, chest pain, open sores or wounds. Denies hx diabetes. Denies taking any blood thinning medications in the last 10 days.         Past Medical History:     Past Medical History:   Diagnosis Date    GERD (gastroesophageal reflux disease)     Hemorrhoid     Hypertension     PSA elevation         Past Surgical History:     Past Surgical History:   Procedure Laterality Date    BACK SURGERY      CYSTOSCOPY N/A 5/12/2021    CYSTOSCOPY BLADDER IRRIGATION CLOT EVACUATION performed by Mayda Cooley MD at [address] [place] [address]          Medications Prior to Admission:     Prior to Admission medications    Medication Sig Start Date extremities  BEHAVIOR/PSYCH: Anxiety negative for depression    Physical Exam:   BP (!) 155/101   Pulse 95   Temp 97 °F (36.1 °C) (Temporal)   Resp 16   Ht 6' (1.829 m)   Wt 144 lb (65.3 kg)   SpO2 100%   BMI 19.53 kg/m²   No results for input(s): POCGLU in the last 72 hours. General Appearance:  alert, well appearing, and in no acute distress  Mental status: oriented to person, place, and time with normal affect  Head:  normocephalic, atraumatic. Eye: no icterus, redness, pupils equal and reactive, extraocular eye movements intact, conjunctiva clear  Ear: normal external ear, no discharge, hearing intact  Nose:  no drainage noted  Mouth: mucous membranes moist  Neck: supple, no carotid bruits, thyroid not palpable  Lungs: Bilateral equal air entry, clear to ausculation, no wheezing, rales or rhonchi, normal effort  Cardiovascular: HR 95 normal rate, regular rhythm, no murmur, gallop, rub. Abdomen: Paige catheter intact and draining with yellow clear urine. Soft, nontender, nondistended, normal bowel sounds  Neurologic: There are no new focal motor or sensory deficits, normal muscle tone and bulk, no abnormal sensation, normal speech, cranial nerves II through XII grossly intact  Skin: No gross lesions, rashes, bruising or bleeding on exposed skin area  Extremities:  peripheral pulses palpable, no pedal edema or calf pain with palpation  Psych: normal affect     Investigations:      Laboratory Testing:  No results found for this or any previous visit (from the past 24 hour(s)). No results for input(s): HGB, HCT, WBC, MCV, PLATELET, NA, K, CL, CO2, BUN, CREATININE, GLUCOSE, INR, PROTIME, APTT, AST, ALT, LABALBU, HCG in the last 720 hours. Recent Labs     06/14/21  1110   COVID19       Not Detected     Imaging/Diagnostics:    No results found. Diagnosis:      1. Elevated PSA    Plans:     1.  Cystoscopy prostate biopsy with ultrasound       FARHAD Griffith CNP  6/18/2021  11:04 AM

## 2021-06-18 NOTE — ANESTHESIA POSTPROCEDURE EVALUATION
POST- ANESTHESIA EVALUATION       Pt Name: Merlinda Albright  MRN: [id number]  YOB: 1955  Date of evaluation: 6/18/2021  Time:  1:31 PM      BP (!) 145/88   Pulse 94   Temp 97.2 °F (36.2 °C) (Temporal)   Resp 19   Ht 6' (1.829 m)   Wt 144 lb (65.3 kg)   SpO2 97%   BMI 19.53 kg/m²      Consciousness Level  Awake  Cardiopulmonary Status  Stable  Pain Adequately Treated YES  Nausea / Vomiting  NO  Adequate Hydration  YES  Anesthesia Related Complications NONE      Electronically signed by Pete Yanez MD on 6/18/2021 at 1:31 PM       Department of Anesthesiology  Postprocedure Note    Patient: Merlinda Albright  MRN: [id number]  [place]: 1955  Date of evaluation: 6/18/2021  Time:  1:30 PM     Procedure Summary     Date: 06/18/21 Room / Location: [facility]    Anesthesia Start: 1147 Anesthesia Stop: 1217    Procedure: CYSTOSCOPY PROSTATE BIOPSY WITH U/S (N/A ) Diagnosis: (DX ELEVATED PSA)    Surgeons: Carol Bradford MD Responsible Provider: Pete Yanez MD    Anesthesia Type: MAC ASA Status: 2          Anesthesia Type: MAC    Devante Phase I:      Devante Phase II: Devante Score: 9    Last vitals: Reviewed and per EMR flowsheets.        Anesthesia Post Evaluation

## 2021-06-18 NOTE — ANESTHESIA PRE PROCEDURE
Department of Anesthesiology  Preprocedure Note       Name:  Pat Richardson   Age:  77 y.o.  :  1955                                          MRN:  [id number]         Date:  2021      Surgeon: Lynn Walker):  Iker Dumont MD    Procedure: Procedure(s):  CYSTOSCOPY PROSTATE BIOPSY WITH U/S    Medications prior to admission:   Prior to Admission medications    Medication Sig Start Date End Date Taking? Authorizing Provider   cephALEXin (KEFLEX) 500 MG capsule Take 1 capsule by mouth daily for 14 doses 21  Betzy Hui PA-C   oxybutynin (DITROPAN) 5 MG tablet Take 1 tablet by mouth 3 times daily as needed (bladder spasms) 5/15/21   Sakina [place] DO   NONFORMULARY superbeta prostate BID    Historical Provider, MD   sennosides-docusate sodium (SENOKOT-S) 8.6-50 MG tablet Take 1 tablet by mouth daily    Historical Provider, MD   CRANBERRY PO Take by mouth daily 3 tabs daily    Historical Provider, MD       Current medications:    Current Facility-Administered Medications   Medication Dose Route Frequency Provider Last Rate Last Admin    0.9 % sodium chloride infusion   Intravenous Continuous Ndal Toño Barbosa MD        lactated ringers infusion   Intravenous Continuous Trey Carreno MD        sodium chloride flush 0.9 % injection 10 mL  10 mL Intravenous 2 times per day Trey Carreno MD        sodium chloride flush 0.9 % injection 10 mL  10 mL Intravenous PRN Trey Carreno MD        0.9 % sodium chloride infusion  25 mL Intravenous PRN Trey Carreno MD        lidocaine PF 1 % injection 1 mL  1 mL Intradermal Once PRN Trey Carreno MD           Allergies:     Allergies   Allergen Reactions    Macrobid [Nitrofurantoin]      High intolerance - projectile diarrhea       Problem List:    Patient Active Problem List   Diagnosis Code    Gross hematuria R31.0    Heavy cigarette smoker (20-39 per day) F17.210    Family history of stomach cancer Z80.0    Family history of Alzheimer's disease Z82.0    Anxiety about health F41.8    Elevated PSA R97.20    Bilateral hydronephrosis due to bladder outlet obstruction N13.30    SANDEEP (acute kidney injury) ([address] N17.9    BPH with obstruction/lower urinary tract symptoms N40.1, N13.8    Severe malnutrition (HCC) E43       Past Medical History:        Diagnosis Date    Hypertension        Past Surgical History:        Procedure Laterality Date    BACK SURGERY      CYSTOSCOPY N/A 5/12/2021    CYSTOSCOPY BLADDER IRRIGATION CLOT EVACUATION performed by Gareth Almeida MD at [address] [place] [address]         Social History:    Social History     Tobacco Use    Smoking status: Current Every Day Smoker     Packs/day: 2.00     Types: Cigarettes    Smokeless tobacco: Never Used   Substance Use Topics    Alcohol use: Never                                Ready to quit: Not Answered  Counseling given: Not Answered      Vital Signs (Current):   Vitals:    06/18/21 1045   BP: (!) 155/101   Pulse: 95   Resp: 16   Temp: 97 °F (36.1 °C)   TempSrc: Temporal   SpO2: 100%                                              BP Readings from Last 3 Encounters:   06/18/21 (!) 155/101   06/08/21 (!) 153/115   05/15/21 137/80       NPO Status:                                                                                 BMI:   Wt Readings from Last 3 Encounters:   06/08/21 144 lb 14.4 oz (65.7 kg)   05/15/21 149 lb 6.4 oz (67.8 kg)   05/30/19 186 lb (84.4 kg)     There is no height or weight on file to calculate BMI.    CBC:   Lab Results   Component Value Date    WBC 10.3 05/15/2021    RBC 2.96 05/15/2021    HGB 8.2 05/15/2021    HCT 26.2 05/15/2021    MCV 88.5 05/15/2021    RDW 13.5 05/15/2021     05/15/2021       CMP:   Lab Results   Component Value Date     05/15/2021    K 3.5 05/15/2021     05/15/2021    CO2 19 05/15/2021    BUN 12 05/15/2021    CREATININE 1.83 05/15/2021    GFRAA 45 05/15/2021    LABGLOM 37 05/15/2021    GLUCOSE 103 05/15/2021    CALCIUM 8.6 05/15/2021       POC Tests: No results for input(s): POCGLU, POCNA, POCK, POCCL, POCBUN, POCHEMO, POCHCT in the last 72 hours. Coags: No results found for: PROTIME, INR, APTT    HCG (If Applicable): No results found for: PREGTESTUR, PREGSERUM, HCG, HCGQUANT     ABGs: No results found for: PHART, PO2ART, JNK1EJZ, BSP3TGW, BEART, [id number]     Type & Screen (If Applicable):  No results found for: LABABO, LABRH    Drug/Infectious Status (If Applicable):  No results found for: HIV, HEPCAB    COVID-19 Screening (If Applicable):   Lab Results   Component Value Date    COVID19 Not Detected 06/14/2021           Anesthesia Evaluation  Patient summary reviewed and Nursing notes reviewed no history of anesthetic complications:   Airway: Mallampati: I  TM distance: >3 FB   Neck ROM: limited  Mouth opening: > = 3 FB Dental:    (+) edentulous      Pulmonary:normal exam    (+) current smoker                           Cardiovascular:    (+) hypertension:,                   Neuro/Psych:               GI/Hepatic/Renal: Neg GI/Hepatic/Renal ROS            Endo/Other: Negative Endo/Other ROS                    Abdominal:           Vascular:                                      Anesthesia Plan      MAC     ASA 2           MIPS: Postoperative opioids intended and Prophylactic antiemetics administered. Anesthetic plan and risks discussed with patient. Plan discussed with CRNA.                   Tuan Dumont MD   6/18/2021

## 2021-06-18 NOTE — OP NOTE
Operative Note      Patient: Roxana Tellez  YOB: 1955  MRN: [id number]    Date of Procedure: 6/18/2021    Pre-Op Diagnosis: DX ELEVATED PSA, urinary retention, gross hematuria    Post-Op Diagnosis: Same and With hypotonic bladder, bladder outlet obstruction, resolution of hemorrhagic cystitis       Procedure(s):  CYSTOSCOPY PROSTATE BIOPSY WITH U/S    Surgeon(s):  Alyssa Calhoun MD    Assistant:   * No surgical staff found *    Anesthesia: General    Estimated Blood Loss (mL): Minimal    Complications: None    Specimens:   ID Type Source Tests Collected by Time Destination   1 : cysto urine Urine Bladder URINE RT REFLEX TO CULTURE Alyssa Calhoun MD 6/18/2021 1202    A : RIGHT BASE LATERAL Tissue Prostate SURGICAL PATHOLOGY Alyssa Calhoun MD 6/18/2021 1123    B : RIGHT MID LATERAL Tissue Prostate SURGICAL PATHOLOGY Alyssa Calhoun MD 6/18/2021 1123    C : RIGHT BASE MEDIAL Tissue Prostate SURGICAL PATHOLOGY Alyssa Calhoun MD 6/18/2021 1123    D : RIGHT MID MEDIAL Tissue Prostate SURGICAL PATHOLOGY Alyssa Calhoun MD 6/18/2021 1123    E : RIGHT APEX LATERAL Tissue Prostate SURGICAL PATHOLOGY Alyssa Calhoun MD 6/18/2021 1123    F : right apex medial Tissue Prostate SURGICAL PATHOLOGY Alyssa Calhoun MD 6/18/2021 1155    G : LEFT BASE LATERAL Tissue Prostate SURGICAL PATHOLOGY Alyssa Calhoun MD 6/18/2021 1123    H : LEFT BASE MEDIAL Tissue Prostate SURGICAL PATHOLOGY Alyssa Calhoun MD 6/18/2021 1123    I : LEFT MID LATERAL Tissue Prostate SURGICAL PATHOLOGY Alyssa Calhoun MD 6/18/2021 1123    J : LEFT MID MEDIAL Tissue Prostate SURGICAL PATHOLOGY Alyssa Calhoun MD 6/18/2021 1123    K : LEFT APEX LATERAL Tissue Prostate SURGICAL PATHOLOGY Alyssa Calhoun MD 6/18/2021 1123    L : LEFT APEX MEDIAL Tissue Prostate SURGICAL PATHOLOGY Alyssa Calhoun MD 6/18/2021 1123        Implants:  * No implants in log *      Drains:   [REMOVED] Urethral Catheter 22 fr (Removed)       [REMOVED] Urethral Catheter Double-lumen 16 fr (Removed)   Catheter Indications Urinary retention (acute or chronic), continuous bladder irrigation or bladder outlet obstruction 06/08/21 1935   Urine Color Yellow 06/18/21 1100   Urine Appearance Clear 06/18/21 1100       Findings: 58-year-old male, the patient was seen in consultation for evaluation with gross hematuria urinary retention bilateral hydronephrosis. The patient had severe hemorrhagic cystitis documented with severe bladder trabeculations, he also was found to have a markedly elevated PSA at 52    Patient scheduled for ultrasound-guided biopsy of the prostate    Also repeat cystoscopy after decompression of the bladder with an indwelling Paige catheter    Detailed Description of Procedure:   Patient was brought to the operating room, positioned in dorsolithotomy, proper patient identification procedure identification we entered the bladder with the cystoscope, prostate hypertrophy and bladder outlet obstruction identified. The anterior the bladder was examined, there was significant improvement in the hemorrhagic cystitis, bladder trabeculations are present ureteral orifices effluxing clear urine, the urgent there is resolution of the inflammation noted at the time of the cystoscopy about 1 month ago. No tumors identified. After completing the cystoscopy the patient was positioned in left lateral position. Transrectal ultrasonography was then initiated. Volume measurement of the prostate was obtained. Also scanning of the prostate demonstrated prostate calcifications and heterogeneous appearance of the prostate gland. We then proceeded with ultrasound-guided biopsy    A total of 12 specimens were obtained from the apex the mid gland and the base of the prostate and sent to pathology separately. At the completion the patient requested a Paige catheter to be reinserted.     A #16 Paige was inserted connected to drainage bag and the patient returned to

## 2021-06-23 LAB — SURGICAL PATHOLOGY REPORT: NORMAL

## 2021-07-01 ENCOUNTER — TELEPHONE (OUTPATIENT)
Dept: RADIATION ONCOLOGY | Facility: MEDICAL CENTER | Age: 66
End: 2021-07-01

## 2021-07-01 NOTE — TELEPHONE ENCOUNTER
Incoming referral from Dr. Alyson Starr to Dr. Araceli Jimenez. I called pt to schedule, no anser, left messg. Daughter, Adair patricio, called back stating she makes all of pts appts. She is on vacation next week, therefore requested appt week of 7/12/21. Consult scheduled 7/12/21 @2:00. Address given.

## 2021-07-07 ENCOUNTER — TELEPHONE (OUTPATIENT)
Dept: ONCOLOGY | Age: 66
End: 2021-07-07

## 2021-07-07 NOTE — TELEPHONE ENCOUNTER
Writer called pt daughter/hippa Arthuro [place] to introduce self as navigator. No answer, detailed VM left. Will await a return call.

## 2021-07-12 ENCOUNTER — TELEPHONE (OUTPATIENT)
Dept: RADIATION ONCOLOGY | Facility: MEDICAL CENTER | Age: 66
End: 2021-07-12

## 2021-07-12 ENCOUNTER — HOSPITAL ENCOUNTER (OUTPATIENT)
Dept: RADIATION ONCOLOGY | Facility: MEDICAL CENTER | Age: 66
Discharge: HOME | End: 2021-07-12
Payer: MEDICARE

## 2021-07-12 VITALS
DIASTOLIC BLOOD PRESSURE: 103 MMHG | SYSTOLIC BLOOD PRESSURE: 171 MMHG | OXYGEN SATURATION: 99 % | HEART RATE: 100 BPM | WEIGHT: 145.13 LBS | BODY MASS INDEX: 19.66 KG/M2 | TEMPERATURE: 96.9 F | RESPIRATION RATE: 16 BRPM | HEIGHT: 72 IN

## 2021-07-12 DIAGNOSIS — C61 MALIGNANT NEOPLASM OF PROSTATE (HCC): Primary | ICD-10-CM

## 2021-07-12 PROCEDURE — 99213 OFFICE O/P EST LOW 20 MIN: CPT | Performed by: RADIOLOGY

## 2021-07-12 PROCEDURE — 99215 OFFICE O/P EST HI 40 MIN: CPT | Performed by: RADIOLOGY

## 2021-07-12 RX ORDER — CEPHALEXIN 500 MG/1
500 CAPSULE ORAL 2 TIMES DAILY
COMMUNITY
End: 2021-08-03

## 2021-07-12 ASSESSMENT — PAIN SCALES - GENERAL: PAINLEVEL_OUTOF10: 5

## 2021-07-12 ASSESSMENT — PAIN DESCRIPTION - PAIN TYPE: TYPE: CHRONIC PAIN

## 2021-07-12 ASSESSMENT — PAIN DESCRIPTION - ONSET: ONSET: ON-GOING

## 2021-07-12 ASSESSMENT — PAIN DESCRIPTION - LOCATION: LOCATION: ABDOMEN

## 2021-07-12 ASSESSMENT — PAIN DESCRIPTION - FREQUENCY: FREQUENCY: INTERMITTENT

## 2021-07-12 ASSESSMENT — PAIN DESCRIPTION - ORIENTATION: ORIENTATION: RIGHT

## 2021-07-12 ASSESSMENT — PAIN DESCRIPTION - PROGRESSION: CLINICAL_PROGRESSION: NOT CHANGED

## 2021-07-12 NOTE — TELEPHONE ENCOUNTER
Dr Alonso Nicholas ordered Bone scan. I scheduled it at [facility] on Thursday 7/15/2021 @ 7:30am. Copy of order given to patient. Follow up schedued on 8/23/2021 @ 11am. Patient will need to see Dr Radha Vance for injection also. Daughter will call to schedule that appointment.

## 2021-07-12 NOTE — PROGRESS NOTES
Referring Physician: Dina Kumar    Pt here for consult. Patient has prostate in. PSA in 40's. Dr. Steffi Sandhoff to al.  Vitals:    21 1413   BP: (!) 171/103   Pulse:    Resp:    Temp:    SpO2:     :  Patient Currently in Pain: Yes  Pain Assessment: 0-10  Pain Level: 5       Wt Readings from Last 1 Encounters:   21 145 lb 2 oz (65.8 kg)        Body mass index is 19.68 kg/m². Height: 6' (182.9 cm)         Immunizations:    Influenza status:    []   Current   [x]   Patient declined    Pneumococcal status:  []   Current  [x]   Patient declined  Covid status:   []  Dose #1:                     []  Dose #2:               [x]   Patient declined    Smoking Status:    [x] Smoker - PPD:2 packs daily   [] Nonsmoker - Quit Date:               [] Never a smoker      No chief complaint on file. Cancer Staging  No matching staging information was found for the patient. Prior Radiation Therapy? No   If yes, site treated:   Facility:                             Date:    Concurrent Chemo/radiation? No   If yes, start date:    Prior Chemotherapy? No   If yes    Facility:                             Date:    Prior Hormonal Therapy? No   If yes   Facility:                             Date:    Head and Neck Cancer? No   If yes, please remind physician to place swallow study order and speech therapy order.       Pacemaker/Defibulator/ICD:  No             BREAST/GYN  Patient only:     LMP:    Age at first Menses:    Para:    :    Cup size:    Lymphedema Evaluation[de-identified]   [] left arm      [] right arm  Location:     Measurement (cm)    Upper Bicep :    Lower Bicep :           Current Outpatient Medications   Medication Sig Dispense Refill    cephALEXin (KEFLEX) 500 MG capsule Take 500 mg by mouth 2 times daily      oxybutynin (DITROPAN) 5 MG tablet Take 1 tablet by mouth 3 times daily as needed (bladder spasms) 90 tablet 3    sennosides-docusate sodium (SENOKOT-S) 8.6-50 MG tablet Take 1 tablet by mouth daily      CRANBERRY PO Take by mouth daily 3 tabs daily      NONFORMULARY superbeta prostate BID       No current facility-administered medications for this encounter. Past Medical History:   Diagnosis Date    GERD (gastroesophageal reflux disease)     Hemorrhoid     Hypertension     PSA elevation        Past Surgical History:   Procedure Laterality Date    BACK SURGERY      CYSTOSCOPY N/A 5/12/2021    CYSTOSCOPY BLADDER IRRIGATION CLOT EVACUATION performed by Russell Voss MD at [facility] N/A 6/18/2021    CYSTOSCOPY PROSTATE BIOPSY WITH U/S performed by Russell Voss MD at [address]  6/18/2021    US PROSTATE NEEDLE PUNCH 6/18/2021 [facility]TRASOUND       Family History   Problem Relation Age of Onset    Cancer Mother     Cancer Sister        Social History     Socioeconomic History    Marital status:      Spouse name: Not on file    Number of children: 2    Years of education: Not on file    Highest education level: Not on file   Occupational History    Not on file   Tobacco Use    Smoking status: Current Every Day Smoker     Packs/day: 2.00     Types: Cigarettes    Smokeless tobacco: Never Used   Vaping Use    Vaping Use: Never used   Substance and Sexual Activity    Alcohol use: Never    Drug use: Never    Sexual activity: Not on file   Other Topics Concern    Not on file   Social History Narrative    Not on file     Social Determinants of Health     Financial Resource Strain:     Difficulty of Paying Living Expenses:    Food Insecurity:     Worried About Running Out of Food in the Last Year:     [address] in the Last Year:    Transportation Needs:     Lack of Transportation (Medical):      Lack of Transportation (Non-Medical):    Physical Activity:     Days of Exercise per Week:     Minutes of Exercise per Session:    Stress:     Feeling of Stress :    Social Connections:     Frequency of Communication with Friends and Family:     Frequency of Social Gatherings with Friends and Family:     Attends [employer] Services:     Active Member of Clubs or Organizations:     Attends Club or Organization Meetings:     Marital Status:    Intimate Partner Violence:     Fear of Current or Ex-Partner:     Emotionally Abused:     Physically Abused:     Sexually Abused:              FALLS RISK SCREEN  Instructions:  Assess the patient and enter the appropriate indicators that are present for fall risk identification. Total the numbers entered and assign a fall risk score from Table 2.  Reassess patient at a minimum every 12 weeks or with status change. Assessment   Date  7/12/2021     1. Mental Ability: confusion/cognitively impaired 0     2. Elimination Issues: incontinence, frequency 3       3. Ambulatory: use of assistive devices (walker, cane, off-loading devices),        attached to equipment (IV pole, oxygen) 0     4. Sensory Limitations: dizziness, vertigo, impaired vision 0     5. Age less than 65        0     6. Age 72 or greater 1     7. Medication: diuretics, strong analgesics, hypnotics, sedatives,        antihypertensive agents 0   8. Falls:  recent history of falls within the last 3 months (not to include slipping or        tripping) 7   TOTAL 11    If score of 4 or greater was education given? Yes       TABLE 2   Risk Score Risk Level Plan of Care   0-3 Little or  No Risk 1. Provide assistance as indicated for ambulation activities  2. Reorient confused/cognitively impaired patient  3. Call-light/bell within patient's reach  4. Chair/bed in low position, stretcher/bed with siderails up except when performing patient care activities  5. Educate patient/family/caregiver on falls prevention  6.  Reassess in 12 weeks or with any noted change in patient condition which places them at a risk for a fall   4-6 Moderate Risk 1.   Provide assistance as indicated for ambulation

## 2021-07-13 ENCOUNTER — TELEPHONE (OUTPATIENT)
Dept: RADIATION ONCOLOGY | Facility: MEDICAL CENTER | Age: 66
End: 2021-07-13

## 2021-07-13 NOTE — TELEPHONE ENCOUNTER
Tamara from DR. Madeline Swift called me to let me know she would like patient to come in August 9th 2021 for Eligard shot. I updated Dr Eduardo Ventura and he states we need shot sooner. .he needs shot and then 1 month later we see him. Tamara suggested July 26th and that was agreed upon. Tamara stated she will call the daughter and update the new date. Patient is set to have bone scan July 15th.

## 2021-07-13 NOTE — TELEPHONE ENCOUNTER
Patient's daughter called this am asking help with getting a shot at Dr. Carol Samson office. Daughter states that Tamara the nurse stated we needed to provide an order for what shot and dose we wanted? I informed daughter I would contact Tamara and take care of it. Called and spoke to [facility] at Dr. Carol Samson office and let her know we would like patient to get Lupron or Eligard due to elevated PSA level and that we usually have Dr. Clay Hill assess what dose and med to give with an appt to see him. She stated she will get the patient an appt.

## 2021-07-15 ENCOUNTER — HOSPITAL ENCOUNTER (OUTPATIENT)
Dept: NUCLEAR MEDICINE | Age: 66
Discharge: HOME | End: 2021-07-17
Payer: MEDICARE

## 2021-07-15 DIAGNOSIS — C61 MALIGNANT NEOPLASM OF PROSTATE (HCC): ICD-10-CM

## 2021-07-15 PROCEDURE — 78306 BONE IMAGING WHOLE BODY: CPT

## 2021-07-15 PROCEDURE — 3430000000 HC RX DIAGNOSTIC RADIOPHARMACEUTICAL: Performed by: RADIOLOGY

## 2021-07-15 PROCEDURE — A9503 TC99M MEDRONATE: HCPCS | Performed by: RADIOLOGY

## 2021-07-15 RX ORDER — TC 99M MEDRONATE 20 MG/10ML
25 INJECTION, POWDER, LYOPHILIZED, FOR SOLUTION INTRAVENOUS
Status: COMPLETED | OUTPATIENT
Start: 2021-07-15 | End: 2021-07-15

## 2021-07-15 RX ADMIN — TC 99M MEDRONATE 24.6 MILLICURIE: 20 INJECTION, POWDER, LYOPHILIZED, FOR SOLUTION INTRAVENOUS at 07:45

## 2021-07-16 ENCOUNTER — TELEPHONE (OUTPATIENT)
Dept: ONCOLOGY | Age: 66
End: 2021-07-16

## 2021-07-16 NOTE — TELEPHONE ENCOUNTER
Name: Marion Hernandez  : 1955  MRN: [id number]    Oncology Navigation- Initial Note:    Intake-  Contact Type: Telephone    Diagnosis: Prostate    Home Disposition: Lives alone    Patient needs and barriers to care: Nio Barriers Identified     Referral Source: Health Professional    Receptive to Advanced Care Planning/ Palliative Care:  n/a    Interventions-   General Interventions: none     Education/Screenings:  no     Currently on HRT: no     Referrals: none     Biopsy site status: prostate       Continuum of Care: Diagnosis/Active Treatment    Notes: Writer called patient and introduced self as navigator. Name and contact number provided. Writer explained my role in his care moving forward. No barriers identified today. Writer did encourage him to reach out to me if needed. Pt did ask if theres a way he can find out his bone scan results from yesterday. He relays he's very anxious about result. Writer did note scan was resulted. Writer called Dr. Jin Cyr to ask if he or I could give pt result over phone. Dr. Meet Lockwood to do so. Writer called patient back to inform him of his negative bone scan result. Pt was very appreciative and states a weight was lifted off of him getting those results. Pt to have hormone injection on  with Dr. Robert Wall and then one month later will have f/u with Dr. Jin Cyr. Will continue to follow.     Electronically signed by Micki Snowden RN on 2021 at 2:18 PM

## 2021-08-03 ENCOUNTER — HOSPITAL ENCOUNTER (EMERGENCY)
Age: 66
Discharge: HOME | End: 2021-08-03
Attending: STUDENT IN AN ORGANIZED HEALTH CARE EDUCATION/TRAINING PROGRAM
Payer: MEDICARE

## 2021-08-03 ENCOUNTER — TELEPHONE (OUTPATIENT)
Dept: RADIATION ONCOLOGY | Facility: MEDICAL CENTER | Age: 66
End: 2021-08-03

## 2021-08-03 VITALS
OXYGEN SATURATION: 99 % | DIASTOLIC BLOOD PRESSURE: 119 MMHG | HEART RATE: 94 BPM | RESPIRATION RATE: 16 BRPM | WEIGHT: 143.2 LBS | HEIGHT: 72 IN | BODY MASS INDEX: 19.39 KG/M2 | SYSTOLIC BLOOD PRESSURE: 189 MMHG | TEMPERATURE: 98.2 F

## 2021-08-03 DIAGNOSIS — R31.9 URINARY TRACT INFECTION WITH HEMATURIA, SITE UNSPECIFIED: Primary | ICD-10-CM

## 2021-08-03 DIAGNOSIS — N39.0 URINARY TRACT INFECTION WITH HEMATURIA, SITE UNSPECIFIED: Primary | ICD-10-CM

## 2021-08-03 LAB
-: ABNORMAL
AMORPHOUS: ABNORMAL
BACTERIA: ABNORMAL
BILIRUBIN URINE: NEGATIVE
CASTS UA: ABNORMAL /LPF
COLOR: YELLOW
COMMENT UA: ABNORMAL
CRYSTALS, UA: ABNORMAL /HPF
EPITHELIAL CELLS UA: ABNORMAL /HPF (ref 0–5)
GLUCOSE URINE: NEGATIVE
KETONES, URINE: NEGATIVE
LEUKOCYTE ESTERASE, URINE: ABNORMAL
MUCUS: ABNORMAL
NITRITE, URINE: POSITIVE
OTHER OBSERVATIONS UA: ABNORMAL
PH UA: 6 (ref 5–8)
PROTEIN UA: NEGATIVE
RBC UA: ABNORMAL /HPF (ref 0–2)
RENAL EPITHELIAL, UA: ABNORMAL /HPF
SPECIFIC GRAVITY UA: 1 (ref 1–1.03)
TRICHOMONAS: ABNORMAL
TURBIDITY: ABNORMAL
URINE HGB: ABNORMAL
UROBILINOGEN, URINE: NORMAL
WBC UA: ABNORMAL /HPF (ref 0–5)
YEAST: ABNORMAL

## 2021-08-03 PROCEDURE — 6370000000 HC RX 637 (ALT 250 FOR IP): Performed by: STUDENT IN AN ORGANIZED HEALTH CARE EDUCATION/TRAINING PROGRAM

## 2021-08-03 PROCEDURE — 99283 EMERGENCY DEPT VISIT LOW MDM: CPT

## 2021-08-03 PROCEDURE — 81001 URINALYSIS AUTO W/SCOPE: CPT

## 2021-08-03 PROCEDURE — 87077 CULTURE AEROBIC IDENTIFY: CPT

## 2021-08-03 PROCEDURE — 87186 SC STD MICRODIL/AGAR DIL: CPT

## 2021-08-03 PROCEDURE — 51702 INSERT TEMP BLADDER CATH: CPT

## 2021-08-03 PROCEDURE — 87086 URINE CULTURE/COLONY COUNT: CPT

## 2021-08-03 RX ORDER — CEPHALEXIN 500 MG/1
500 CAPSULE ORAL ONCE
Status: COMPLETED | OUTPATIENT
Start: 2021-08-03 | End: 2021-08-03

## 2021-08-03 RX ORDER — CEPHALEXIN 500 MG/1
500 CAPSULE ORAL 2 TIMES DAILY
Qty: 14 CAPSULE | Refills: 0 | Status: SHIPPED | OUTPATIENT
Start: 2021-08-03 | End: 2021-08-10

## 2021-08-03 RX ORDER — LIDOCAINE HYDROCHLORIDE 20 MG/ML
5 JELLY TOPICAL ONCE
Status: COMPLETED | OUTPATIENT
Start: 2021-08-03 | End: 2021-08-03

## 2021-08-03 RX ORDER — LIDOCAINE HYDROCHLORIDE 20 MG/ML
JELLY TOPICAL ONCE
Status: DISCONTINUED | OUTPATIENT
Start: 2021-08-03 | End: 2021-08-03

## 2021-08-03 RX ADMIN — LIDOCAINE HYDROCHLORIDE 5 ML: 20 JELLY TOPICAL at 13:15

## 2021-08-03 RX ADMIN — CEPHALEXIN 500 MG: 500 CAPSULE ORAL at 13:53

## 2021-08-03 ASSESSMENT — PAIN DESCRIPTION - DESCRIPTORS: DESCRIPTORS: BURNING;CONSTANT

## 2021-08-03 ASSESSMENT — PAIN DESCRIPTION - FREQUENCY: FREQUENCY: CONTINUOUS

## 2021-08-03 ASSESSMENT — PAIN SCALES - GENERAL: PAINLEVEL_OUTOF10: 5

## 2021-08-03 ASSESSMENT — PAIN DESCRIPTION - LOCATION: LOCATION: PENIS

## 2021-08-03 NOTE — ED NOTES
Discharge instructions and follow up care reviewed and all questions answered at this time. Patient stable and ambulatory at time of discharge.       Johanna White RN  08/03/21 [id number]

## 2021-08-03 NOTE — TELEPHONE ENCOUNTER
Jaycee Bamberger called on behalf of the patient stating patient got Eligard shot at Dr. Kaylyn Jacome office and now is experiencing blood in urine, burning, and discharge. Patient has had a catheter for 3.5 months. Jaycee Bamberger stated they tried to get into dr. Kaylyn Jacome office and they are    not able to get in soon. I took down phone number and asked to call Brisa back to see what I could do to help patient, pt has not started treatment with us yet. I called Dr. Kaylyn Jacome office and left msg with Carmina. She called me back and left vm stating patient got Eligard 7.5ms on 7-26-21. I then called her back and left 2nd  stating patient symptoms and what course of action the patient should take bc patient is not on treatment with us yet.

## 2021-08-04 ASSESSMENT — ENCOUNTER SYMPTOMS
NAUSEA: 0
BACK PAIN: 0
ABDOMINAL PAIN: 0
SHORTNESS OF BREATH: 0
EYE DISCHARGE: 0
VOMITING: 0
EYE REDNESS: 0

## 2021-08-04 NOTE — ED PROVIDER NOTES
[facility] ED  Emergency Department Encounter     Pt Name: Artem Heck  MRN: [id number]  [place] 1955  Date of evaluation: 8/4/21  PCP:  Greg Suggs MD    [address] [place] [address]       Chief Complaint   Patient presents with    Urinary Catheter Problem     latex cath placed a week ago    Allergic Reaction     latex       HISTORY OFPRESENT ILLNESS  (Location/Symptom, Timing/Onset, Context/Setting, Quality, Duration, Modifying Factors,Severity.)      Artem Heck is a 74 y.o. male who presents with history of prostate cancer not on active chemotherapy or radiation presenting with dysuria. Has been following with Dr. Catie Alvarenga and has had multiple Paige placements for urinary retention. States a new Paige was placed recently which reportedly has latex and patient has latex allergy. Reportedly have attempted a voiding trial however urologist requesting Paige remain in until end of radiation therapy. Denies any back pain, nausea, vomiting, fevers, chills, abdominal pain. PAST MEDICAL / SURGICAL / SOCIAL / FAMILY HISTORY      has a past medical history of GERD (gastroesophageal reflux disease), Hemorrhoid, Hypertension, and PSA elevation. has a past surgical history that includes hernia repair; back surgery; Tonsillectomy; Cystoscopy (N/A, 5/12/2021); US BIOPSY PROSTATE NEEDLE/PUNCH (6/18/2021); and Prostate biopsy (N/A, 6/18/2021).     Social History     Socioeconomic History    Marital status:      Spouse name: Not on file    Number of children: 2    Years of education: Not on file    Highest education level: Not on file   Occupational History    Not on file   Tobacco Use    Smoking status: Current Every Day Smoker     Packs/day: 2.00     Types: Cigarettes    Smokeless tobacco: Never Used   Vaping Use    Vaping Use: Never used   Substance and Sexual Activity    Alcohol use: Never    Drug use: Never    Sexual activity: Not on file   Other Topics Concern    Not on file   Social History Narrative    Not on file     Social Determinants of Health     Financial Resource Strain:     Difficulty of Paying Living Expenses:    Food Insecurity:     Worried About Running Out of Food in the Last Year:     [address] in the Last Year:    Transportation Needs:     Lack of Transportation (Medical):  Lack of Transportation (Non-Medical):    Physical Activity:     Days of Exercise per Week:     Minutes of Exercise per Session:    Stress:     Feeling of Stress :    Social Connections:     Frequency of Communication with Friends and Family:     Frequency of Social Gatherings with Friends and Family:     Attends [employer] Services:     Active Member of Clubs or Organizations:     Attends Club or Organization Meetings:     Marital Status:    Intimate Partner Violence:     Fear of Current or Ex-Partner:     Emotionally Abused:     Physically Abused:     Sexually Abused:        Family History   Problem Relation Age of Onset    Cancer Mother     Cancer Sister        Allergies:  Latex and Macrobid [nitrofurantoin]    Home Medications:  Prior to Admission medications    Medication Sig Start Date End Date Taking? Authorizing Provider   cephALEXin (KEFLEX) 500 MG capsule Take 1 capsule by mouth 2 times daily for 7 days 8/3/21 8/10/21 Yes Howard Wright, DO   oxybutynin (DITROPAN) 5 MG tablet Take 1 tablet by mouth 3 times daily as needed (bladder spasms) 5/15/21  Yes Tim Hodge, DO   NONFORMULARY superbeta prostate BID   Yes Historical Provider, MD   sennosides-docusate sodium (SENOKOT-S) 8.6-50 MG tablet Take 1 tablet by mouth daily   Yes Historical Provider, MD   CRANBERRY PO Take by mouth daily 3 tabs daily   Yes Historical Provider, MD       REVIEW OF SYSTEMS    (2-9 systems for level 4, 10 or more for level 5)      Review of Systems   Constitutional: Negative for chills and fever. Eyes: Negative for discharge and redness.    Respiratory: Negative for shortness of breath. Cardiovascular: Negative for chest pain. Gastrointestinal: Negative for abdominal pain, nausea and vomiting. Genitourinary: Positive for dysuria. Musculoskeletal: Negative for back pain. Skin: Negative for rash. Allergic/Immunologic: Positive for environmental allergies. Neurological: Negative for headaches. Psychiatric/Behavioral: Negative for agitation and confusion. PHYSICAL EXAM   (up to 7 for level 4, 8 or more for level 5)     INITIAL VITALS:    height is 6' (1.829 m) and weight is 143 lb 3.2 oz (65 kg). His oral temperature is 98.2 °F (36.8 °C). His blood pressure is 189/119 (abnormal) and his pulse is 94. His respiration is 16 and oxygen saturation is 99%. Physical Exam  Vitals and nursing note reviewed. Constitutional:       Appearance: He is well-developed. HENT:      Head: Normocephalic and atraumatic. Nose: Nose normal.      Mouth/Throat:      Mouth: Mucous membranes are moist.   Eyes:      General: No scleral icterus. Conjunctiva/sclera: Conjunctivae normal.      Pupils: Pupils are equal, round, and reactive to light. Neck:      Trachea: No tracheal deviation. Cardiovascular:      Rate and Rhythm: Normal rate and regular rhythm. Heart sounds: Normal heart sounds. No murmur heard. No friction rub. No gallop. Pulmonary:      Effort: Pulmonary effort is normal. No respiratory distress. Breath sounds: Normal breath sounds. No wheezing or rales. Abdominal:      General: Bowel sounds are normal. There is no distension. Palpations: Abdomen is soft. There is no mass. Tenderness: There is no abdominal tenderness. There is no guarding or rebound. Genitourinary:     Comments: Paige in place draining clear urine, no external rash on genitalia visualized  Musculoskeletal:         General: Normal range of motion. Cervical back: Neck supple. Skin:     General: Skin is warm and dry. Findings: No erythema or rash. Neurological:      Mental Status: He is alert and oriented to person, place, and time. Psychiatric:         Behavior: Behavior normal.         DIFFERENTIAL  DIAGNOSIS     PLAN (LABS / IMAGING / EKG):  Orders Placed This Encounter   Procedures    Culture, Urine    Urinalysis with Microscopic    Insert indwelling urinary catheter       MEDICATIONS ORDERED:  Orders Placed This Encounter   Medications    DISCONTD: lidocaine (XYLOCAINE) 2 % jelly    lidocaine (XYLOCAINE) 2 % uro-jet 5 mL    cephALEXin (KEFLEX) capsule 500 mg     Order Specific Question:   Antimicrobial Indications     Answer:   Urinary Tract Infection    cephALEXin (KEFLEX) 500 MG capsule     Sig: Take 1 capsule by mouth 2 times daily for 7 days     Dispense:  14 capsule     Refill:  0       DDX: Latex allergy versus urinary tract infection    Initial MDM/Plan: 74 y.o. male who presents with dysuria. Will replace Paige with latex free Paige however symptomatology could certainly be from urinary tract infection. Will check urine sent for culture. No systemic symptoms. Low suspicion for sepsis or pyelonephritis. Dissipate discharge. DIAGNOSTIC RESULTS / EMERGENCY DEPARTMENT COURSE / MDM     LABS:  Labs Reviewed   URINALYSIS WITH MICROSCOPIC - Abnormal; Notable for the following components:       Result Value    Turbidity UA SLIGHTLY CLOUDY (*)     Urine Hgb 3+ (*)     Nitrite, Urine POSITIVE (*)     Leukocyte Esterase, Urine LARGE (*)     Bacteria, UA MANY (*)     All other components within normal limits   CULTURE, URINE         RADIOLOGY:  No results found. EMERGENCY DEPARTMENT COURSE:  ED Course as of Aug 04 0247   Tue Aug 03, 2021   1301 Confirmed with patient that this is the standard Paige cath Paige. Does have a creatinine currently. Patient's daughter saying minimal difficulty passing however pain Urojet given. Send urinalysis off new Paige if unable to place will replace. No abdominal pain. No fever. No back pain. Joel White DO  EmergencyMedicine Attending    (Please note that portions of this note were completed with a voice recognition program.  Efforts were made to edit the dictations but occasionally words are mis-transcribed.)       Joel White DO  08/04/21 0255

## 2021-08-05 LAB
CULTURE: ABNORMAL
CULTURE: ABNORMAL
Lab: ABNORMAL
SPECIMEN DESCRIPTION: ABNORMAL

## 2021-08-16 ENCOUNTER — TELEPHONE (OUTPATIENT)
Dept: RADIATION ONCOLOGY | Facility: MEDICAL CENTER | Age: 66
End: 2021-08-16

## 2021-08-16 NOTE — TELEPHONE ENCOUNTER
Patient's daughter called stating that patient has infection and had to have catheter changed out. Wondering if patient should come to the next appt on 8-23-21? The appt is a follow up and patient had bone scan done and got ADH shot 7-26-21 and will get his next one 8-31-21 per Daughter. Dr Eddie Rosen updated and he would still like to see him Monday and develop a plan. Daughter stated PSA is rising.

## 2021-08-20 NOTE — CONSULTS
[facility]            Radiation Oncology          [address] [facility]          [address]        Umair Snow: [phone]        F: [phone]       [place]ileon Heels           2021    Patient: Dinah Pettit   YOB: 1955       Dear Dr Lara Muro: Thank you for referring Dinah Pettit to me for evaluation. Below are the relevant portions of my assessment and plan of care. If you have questions, please do not hesitate to call me. I look forward to following this patient along with you. Sincerely,    Electronically signed by Marium Altman MD on 21 at 9:35 AM EDT    CC: Patient Care Team:  Matt Stern MD as PCP - General (Urology)  Marion Murillo RN as Nurse Navigator (Oncology)  Marium Altman MD as Consulting Physician (Radiation Oncology)  ------------------------------------------------------------------------------------------------------------------------------------------------------------------------------------------      RADIATION ONCOLOGY NEW PATIENT VISIT:    Date of Service: 2021    Location:  [facility]     Patient ID:   Dinah Pettit  : 1955   MRN: [id number]    CHIEF COMPLAINT: \"prostate cancer\"    DIAGNOSIS: high risk prostate cancer [id number], PSA 40    HISTORY OF PRESENT ILLNESS:   Dinah Pettit is a 74 y.o. male who presents for consultation of radiotherapy treatment options of the above diagnosis. He's been seen by Chetan Don at the hospital with gross hematuria clot and urinary retention. While at the hospital his PSA was found to be in the 40's. He was discharged with a catheter. Biopsy of the prostate occurred 21 and pathology revealed 2/12 cores positive for adenocarcinoma, GS 4+3 (35% and 20%). PSA on 5/15/21 was 54.65. He saw Urology to discuss treatment options and he was referred to Radiation oncology to discuss non-surgical management.      He still has an indwelling catheter today but denies any hematuria, hematochezia, or problems with diarrhea/constipation. He has not had any ADT yet. He has not been diagnosed with connective tissue disease or crohns/ulcerative colitis. CT abdomen/pelvis 5/12/21 showed no change in hydroureteteronephrosis and no evidence of metastatic disease.     PRIOR RADIATION HISTORY:  No prior history of radiation therapy    PACEMAKER: None    PAST MEDICAL HISTORY:  Past Medical History:   Diagnosis Date    GERD (gastroesophageal reflux disease)     Hemorrhoid     Hypertension     PSA elevation        PAST SURGICAL HISTORY:  Past Surgical History:   Procedure Laterality Date    BACK SURGERY      CYSTOSCOPY N/A 5/12/2021    CYSTOSCOPY BLADDER IRRIGATION CLOT EVACUATION performed by Drew Sal MD at [facility] N/A 6/18/2021    CYSTOSCOPY PROSTATE BIOPSY WITH U/S performed by Drew Sal MD at [address] [facility]  6/18/2021    [address] [place] [address] 6/18/2021 [facility] ULTRASOUND       MEDICATIONS:    Current Outpatient Medications:     oxybutynin (DITROPAN) 5 MG tablet, Take 1 tablet by mouth 3 times daily as needed (bladder spasms), Disp: 90 tablet, Rfl: 3    sennosides-docusate sodium (SENOKOT-S) 8.6-50 MG tablet, Take 1 tablet by mouth daily, Disp: , Rfl:     CRANBERRY PO, Take by mouth daily 3 tabs daily, Disp: , Rfl:     NONFORMULARY, superbeta prostate BID, Disp: , Rfl:     ALLERGIES:   Allergies   Allergen Reactions    Latex     Macrobid [Nitrofurantoin]      High intolerance - projectile diarrhea       SOCIAL HISTORY:  Social History     Socioeconomic History    Marital status:      Spouse name: None    Number of children: 2    Years of education: None    Highest education level: None   Occupational History    None   Tobacco Use    Smoking status: Current Every Day Smoker     Packs/day: 2.00     Types: Cigarettes    Smokeless tobacco: Never Used   Vaping Use    Vaping Use: Never used   Substance and Sexual Activity    Alcohol use: Never    Drug use: Never    Sexual activity: None   Other Topics Concern    None   Social History Narrative    None     Social Determinants of Health     Financial Resource Strain:     Difficulty of Paying Living Expenses:    Food Insecurity:     Worried About Running Out of Food in the Last Year:     [address] in the Last Year:    Transportation Needs:     Lack of Transportation (Medical):  Lack of Transportation (Non-Medical):    Physical Activity:     Days of Exercise per Week:     Minutes of Exercise per Session:    Stress:     Feeling of Stress :    Social Connections:     Frequency of Communication with Friends and Family:     Frequency of Social Gatherings with Friends and Family:     Attends Nondenominational Services:     Active Member of Clubs or Organizations:     Attends Club or Organization Meetings:     Marital Status:    Intimate Partner Violence:     Fear of Current or Ex-Partner:     Emotionally Abused:     Physically Abused:     Sexually Abused:        FAMILY HISTORY:  Family History   Problem Relation Age of Onset    Cancer Mother     Cancer Sister        REVIEW OF SYSTEMS:    GENERAL/CONSTITUTIONAL: The patient denies fever, fatigue, weakness, weight gain or weight loss. HEENT: The patient denies pain, redness, loss of vision, double or blurred vision. The patient denies ringing in the ears, loss of hearing, hoarseness, trouble swallowing, or painful swallowing. CARDIOVASCULAR: The patient denies chest pain, irregular heartbeats, sudden changes in heartbeat or palpitation. RESPIRATORY: The patient denies shortness of breath, cough, hemoptysis. GASTROINTESTINAL: The patient denies nausea, vomiting, diarrhea, constipation, blood in the stools. GENITOURINARY: The patient denies difficult urination, pain or burning with urination, blood in the urine.   MUSCULOSKELETAL: The patient denies arm, buttock, thigh or calf cramps. No joint or muscle pain. SKIN: The patient denies easy bruising, skin redness, skin rash, hives. NEUROLOGIC: The patient denies headache, dizziness, fainting, muscle spasm, loss of consciousness. ENDOCRINE: The patient denies intolerance to hot or cold temperature, flushing. HEMATOLOGIC/LYMPHATIC: The patient denies anemia, bleeding tendency or clotting tendency. ALLERGIC/IMMUNOLOGIC: The patient denies rhinitis, asthma, skin sensitivity. PYSCHOLOGIC: The patient denies any depression, anxiety, or confusion. A full 14 point review of systems was performed and assessed and found to be negative except as noted above. PHYSICAL EXAMINATION:    CHAPERONE: Not Required    ECO Asymptomatic    VITAL SIGNS: BP (!) 171/103   Pulse 100   Temp 96.9 °F (36.1 °C) (Temporal)   Resp 16   Ht 6' (1.829 m)   Wt 145 lb 2 oz (65.8 kg)   SpO2 99%   BMI 19.68 kg/m²   GENERAL:  General appearance is that of a well-nourished, well-developed in no apparent distress. HEENT: Normocephalic, atraumatic, EOMI, moist mucosa, no erythema. Indirect exam .  NECK:  No adenopathy or a palpable thyroid mass, trachea is midline. LYMPHATICS: No cervical, supraclavicular, or axillary adenopathy. HEART:  Regular rate and rhythm, S1, S2, no murmurs. LUNGS:  Clear to auscultation bilaterally with no wheezing or crackles. ABDOMEN:  Soft, nontender, non distended, and no hepatosplenomegaly. EXTREMITIES:  No clubbing, cyanosis, or edema. No calf tenderness. MSK:  No CVA or spinal tenderness. NEUROLOGICAL: No focal deficits. CN II-XII intact. Strength and sensation intact bilaterally. SKIN: No erythema or desquamation.       LABS:  WBC   Date Value Ref Range Status   05/15/2021 10.3 3.5 - 11.3 k/uL Final     Segs Absolute   Date Value Ref Range Status   2021 9.40 (H) 1.50 - 8.10 k/uL Final     Hemoglobin   Date Value Ref Range Status   05/15/2021 8.2 (L) 13.0 - 17.0 g/dL Final     Platelets   Date Value Ref Range Status   05/15/2021 314 138 - 453 k/uL Final     No results found for: , CEA  No results found for:   PSA   Date Value Ref Range Status   05/15/2021 54.65 (H) <4.1 ug/L Final     Comment:     The Roche \"ECLIA\" assay is used. Results obtained with different assay methods cannot be   used interchangeably. 05/10/2021 43.54 (H) <4.1 ug/L Final     Comment:     The Roche \"ECLIA\" assay is used. Results obtained with different assay methods cannot be   used interchangeably. IMAGING:   CT A/P 5/2021 reviewed    PATHOLOGY:  Biopsy report reviewed         ASSESSMENT AND PLAN:   Catrachita Whitten is a 74 y.o. male with High risk prostate cancer GS 4+3, PSA 54, [id number] who presents for discussion of radiotherapy treatment options. Mr. Yaima Gomez has high risk disease because of the elevated PSA. I told him we will need to obtain a bone scan to complete his staging and make sure there is no metastatic disease as that would also change his prognosis and my recommendations. As long as the cancer is localized to the prostate, I recommend radiation therapy with concurrent and long term ADT. The indications, risks, and benefits or treatment were discussed at length and after all questions were answered he expressed understanding and agreement of the plan. He would like to proceed. We will obtain a bone scan first and if that's clear we will send him to Urology to start ADT. I'll let the ADT work on his PSA and prostate (hopefully shrink it some) and then we will bring him back for CT simulation. I will review the results of the bone scan once available and we'll go from there. Patient was in agreement with my recommendations. All questions were answered to their satisfaction. Patient was advised to contact us anytime should they have any questions or concerns.      I spent greater than 65 minutes counseling and evaluating the different treatment options available to the patient and formulating a plan of action today.       Margie Soares MD MD  Electronically signed by Margie Soares MD on 8/20/21 at 9:35 AM EDT      Drugs Prescribed:  New Prescriptions    No medications on file       Orders Placed:     Orders Placed This Encounter   Procedures    NM BONE SCAN WHOLE BODY         CC:  Patient Care Team:  Ketan Mendez MD as PCP - General (Urology)  Evelia Garcia RN as Nurse Navigator (Oncology)  Margie Soares MD as Consulting Physician (Radiation Oncology)

## 2021-08-23 ENCOUNTER — HOSPITAL ENCOUNTER (OUTPATIENT)
Dept: RADIATION ONCOLOGY | Facility: MEDICAL CENTER | Age: 66
Discharge: HOME | End: 2021-08-23
Payer: MEDICARE

## 2021-08-23 VITALS
WEIGHT: 142.5 LBS | TEMPERATURE: 96.8 F | RESPIRATION RATE: 16 BRPM | BODY MASS INDEX: 19.33 KG/M2 | DIASTOLIC BLOOD PRESSURE: 98 MMHG | OXYGEN SATURATION: 98 % | HEART RATE: 98 BPM | SYSTOLIC BLOOD PRESSURE: 172 MMHG

## 2021-08-23 DIAGNOSIS — K92.1 HEMATOCHEZIA: Primary | ICD-10-CM

## 2021-08-23 PROCEDURE — 99213 OFFICE O/P EST LOW 20 MIN: CPT | Performed by: RADIOLOGY

## 2021-08-23 PROCEDURE — 99212 OFFICE O/P EST SF 10 MIN: CPT | Performed by: RADIOLOGY

## 2021-08-23 PROCEDURE — 99999 PR OFFICE/OUTPT VISIT,PROCEDURE ONLY: CPT | Performed by: RADIOLOGY

## 2021-08-23 ASSESSMENT — PAIN SCALES - GENERAL: PAINLEVEL_OUTOF10: 2

## 2021-08-23 ASSESSMENT — PAIN DESCRIPTION - FREQUENCY: FREQUENCY: INTERMITTENT

## 2021-08-23 ASSESSMENT — PAIN DESCRIPTION - PAIN TYPE: TYPE: ACUTE PAIN

## 2021-08-23 ASSESSMENT — PAIN DESCRIPTION - LOCATION: LOCATION: ABDOMEN

## 2021-08-23 ASSESSMENT — PAIN DESCRIPTION - ONSET: ONSET: ON-GOING

## 2021-08-23 ASSESSMENT — PAIN DESCRIPTION - ORIENTATION: ORIENTATION: RIGHT

## 2021-08-23 NOTE — PROGRESS NOTES
Camilla Freya  8/23/2021  10:59 AM    Pt here for follow up visit. ADT received 7-26-21 and another one due 8-31-21. Pt states done with Antibiotic, but having green discharge around the catheter. Dr. Fahad Bender to [facility]. Follow up teach and sim first week of Sept made.     Vitals:    08/23/21 1057   BP: (!) 172/98   Pulse: 98   Resp: 16   Temp: 96.8 °F (36 °C)   SpO2: 98%    :  Patient Currently in Pain: Yes  Pain Assessment: 0-10  Pain Level: 2       Wt Readings from Last 1 Encounters:   08/23/21 142 lb 8 oz (64.6 kg)                Current Outpatient Medications:     oxybutynin (DITROPAN) 5 MG tablet, Take 1 tablet by mouth 3 times daily as needed (bladder spasms), Disp: 90 tablet, Rfl: 3    NONFORMULARY, superbeta prostate BID, Disp: , Rfl:     sennosides-docusate sodium (SENOKOT-S) 8.6-50 MG tablet, Take 1 tablet by mouth daily, Disp: , Rfl:     CRANBERRY PO, Take by mouth daily 3 tabs daily, Disp: , Rfl:     Immunizations:    Influenza status:    []   Current   [x]   Patient declined    Pneumococcal status:  []   Current  [x]   Patient declined  Covid status:   [x]  Dose #1: of a two shot series                     []  Dose #2:               []   Patient declined    Smoking Status:    [x] Smoker - PPD:2 packs daily   [] Nonsmoker - Quit Date:               [] Never a smoker      Cancer Screening:  Colonoscopy   [] Current       [] Not current   [] Not current, but scheduled   [] NA  Mammogram   [] Current       [] Not current   [] Not current, but scheduled   [] NA  Prostate           [] Current       [] Not current   [] Not current, but scheduled   [] NA  PAP/Pelvic      [] Current       [] Not current   [] Not current, but scheduled   [] NA  Skin                 [] Current       [] Not current   [] Not current, but scheduled   [] NA     Hormone:   Lupron []   Last dose given:          Next dose due:   Eligard [x]   Last dose given: 7-26-21           Next dose due: 8-31-21  Aromatase Inhibitors [] Medication name:   N/A:  []             *BREAST Patient only:    Lymphedema Eval:   [] left arm      [] right arm  Location:     Measurement (cm)    Upper Bicep :    Lower Bicep :         FALLS RISK SCREEN  Instructions:  Assess the patient and enter the appropriate indicators that are present for fall risk identification. Total the numbers entered and assign a fall risk score from Table 2.  Reassess patient at a minimum every 12 weeks or with status change. Assessment   Date  8/23/2021     1. Mental Ability: confusion/cognitively impaired 0     2. Elimination Issues: incontinence, frequency 1       3. Ambulatory: use of assistive devices (walker, cane, off-loading devices),        attached to equipment (IV pole, oxygen) 0     4. Sensory Limitations: dizziness, vertigo, impaired vision 0     5. Age less than 65        0     6. Age 72 or greater 1     7. Medication: diuretics, strong analgesics, hypnotics, sedatives,        antihypertensive agents 0   8. Falls:  recent history of falls within the last 3 months (not to include slipping or        tripping) 0   TOTAL 2    If score of 4 or greater was education given? No           TABLE 2   Risk Score Risk Level Plan of Care   0-3 Little or  No Risk 1. Provide assistance as indicated for ambulation activities  2. Reorient confused/cognitively impaired patient  3. Chair/bed in low position, stretcher/bed with siderails up except when performing patient care activities  5. Educate patient/family/caregiver on falls prevention  6.  Reassess in 12 weeks or with any noted change in patient condition which places them at a risk for a fall   4-6 Moderate Risk 1. Provide assistance as indicated for ambulation activities  2. Reorient confused/cognitively impaired patient  3. Chair/bed in low position, stretcher/bed with siderails up except when performing patient care activities  4.   Educate patient/family/caregiver on falls prevention     7 or   Higher High Risk 1. Place patient in easily observable treatment room  2. Patient attended at all times by family member or staff  3. Provide assistance as indicated for ambulation activities  4. Reorient confused/cognitively impaired patient  5. Chair/bed in low position, stretcher/bed with siderails up except when performing patient care activities  6.   Educate patient/family/caregiver on falls prevention         PLAN: Patient is seen today in follow up        Jojo Vasquez RN

## 2021-08-23 NOTE — PROGRESS NOTES
[facility]            Radiation Oncology          [address] [facility]          [address], [address]        Michelle Eugenia: [phone]        F: [phone]       [email or web address]         Date of Service: 2021     Location:  [facility]        RADIATION ONCOLOGY FOLLOW UP NOTE    Patient ID:   Augustina Alvarado  : 1955   MRN: [id number]    DIAGNOSIS:  HR prostate cancer, PSA 40, [id number]    TREATMENT HISTORY: 1st injection of 1 month lupron by Urology last month    INTERVAL HISTORY:   Augustina Alvarado is a 74 y.o.. male who presents for follow up of the above diagnosis and treatment history. He received a 1 month injection of Eligard a few weeks ago and has not experienced any significant untoward effects of it as of yet. He had issues getting his catheter exchanged and ended up getting a bladder infection. He got done with antibiotics for it a few days ago and the burning sensation in his penis has gone away completely. PSA draw recently was up to 48 (40 previously). He is scheduled to see Dr. Jimmy Jose on the  for follow up and another injection of ADT. MEDICATIONS:    Current Outpatient Medications:     oxybutynin (DITROPAN) 5 MG tablet, Take 1 tablet by mouth 3 times daily as needed (bladder spasms), Disp: 90 tablet, Rfl: 3    NONFORMULARY, superbeta prostate BID, Disp: , Rfl:     sennosides-docusate sodium (SENOKOT-S) 8.6-50 MG tablet, Take 1 tablet by mouth daily, Disp: , Rfl:     CRANBERRY PO, Take by mouth daily 3 tabs daily, Disp: , Rfl:     ALLERGIES:  Allergies   Allergen Reactions    Latex     Macrobid [Nitrofurantoin]      High intolerance - projectile diarrhea         REVIEW OF SYSTEMS:    A full 14 point review of systems was performed and assessed and found to be negative except as noted above.       PHYSICAL EXAMINATION:    ECO Symptomatic but completely ambulatory    VITAL SIGNS: BP (!) 172/98   Pulse 98   Temp 96.8 °F (36 °C) (Temporal)   Resp 16 Wt 142 lb 8 oz (64.6 kg)   SpO2 98%   BMI 19.33 kg/m²   GENERAL:  General appearance is that of a well-nourished, well-developed in no apparent distress. HEENT: Normocephalic, atraumatic, EOMI, moist mucosa, no erythema. Indirect exam .  NECK:  No adenopathy or a palpable thyroid mass, trachea is midline. LYMPHATICS: No cervical, supraclavicular, or axillary adenopathy. HEART:  Regular rate and rhythm, S1, S2, no murmurs. LUNGS:  Clear to auscultation bilaterally with no wheezing or crackles. ABDOMEN:  Soft, nontender, non distended, and no hepatosplenomegaly. EXTREMITIES:  No clubbing, cyanosis, or edema. No calf tenderness. MSK:  No CVA or spinal tenderness. NEUROLOGICAL: No focal deficits. CN II-XII intact. Strength and sensation intact bilaterally. SKIN: No erythema or desquamation. LABS:  WBC   Date Value Ref Range Status   05/15/2021 10.3 3.5 - 11.3 k/uL Final     Segs Absolute   Date Value Ref Range Status   05/13/2021 9.40 (H) 1.50 - 8.10 k/uL Final     Hemoglobin   Date Value Ref Range Status   05/15/2021 8.2 (L) 13.0 - 17.0 g/dL Final     Platelets   Date Value Ref Range Status   05/15/2021 314 138 - 453 k/uL Final     No results found for: , CEA  PSA   Date Value Ref Range Status   05/15/2021 54.65 (H) <4.1 ug/L Final     Comment:     The Roche \"ECLIA\" assay is used. Results obtained with different assay methods cannot be   used interchangeably. 05/10/2021 43.54 (H) <4.1 ug/L Final     Comment:     The Roche \"ECLIA\" assay is used. Results obtained with different assay methods cannot be   used interchangeably. IMAGING:   None new      ASSESSMENT AND PLAN:  Juliet Hernandez is a 74 y.o. male with HR prostate cancer who received his first injection of ADT a few weeks ago and presents for follow up. I told him it's likely the PSA was high bc of the cather issues/infection and I'm confident the eligard he got a few weeks ago is working.  He is seeing Dr. Kerri Downing in a few

## 2021-08-24 ENCOUNTER — TELEPHONE (OUTPATIENT)
Dept: ONCOLOGY | Age: 66
End: 2021-08-24

## 2021-08-24 NOTE — TELEPHONE ENCOUNTER
Name: Deric Verduzco  : 1955  MRN: [id number]    Oncology Navigation Follow-Up Note    Contact Type:  Telephone    Notes: Writer called patient to check on him and spoke to his daughter Otis Sutton. She states that pt did understand everything at his appt yesterday and knows his sim/teach will be in September. She did state however that another family went to his appt with her Dad. She says that for about a week now her Dad has been having rectal bleeding that isnt always associated with bowel movements. She also states that he's been constipated recently even while taking senekot twice a day. Writer did call Dr. Latosha Castillo to update those symptoms. Dr. Latosha Castillo is ordering a CT today. Writer will schedule and call pt with time and date. Pt daughter Otis Sutton was updated on above and states that she did call her Dad to let him know she reported his symptoms. Will continue to follow.     Electronically signed by Kaitlynn Torres RN on 2021 at 12:51 PM

## 2021-08-24 NOTE — TELEPHONE ENCOUNTER
CT abd/pelvis scheduled for 9/1/21 at [facility] with arrival of 7am. Pt aware of appt and to be NPO for 2 hours prior to test.

## 2021-08-31 ENCOUNTER — TELEPHONE (OUTPATIENT)
Dept: RADIATION ONCOLOGY | Facility: MEDICAL CENTER | Age: 66
End: 2021-08-31

## 2021-08-31 NOTE — TELEPHONE ENCOUNTER
Sonu Skinner called asking if it was okay to get CT scan done tomorrow with contrast bc they were told by Dr Rangel Orellana that he could not have it bc it would damage his kidneys. 8-30-21 results faxed to us and DR. Roth reviewed and okay to have CT with contrast tomorrow. I called Brisa and relayed info to daughter what the levels are and that CT scan was okay to do tomorrow. Brisa verbalized understanding.

## 2021-09-01 ENCOUNTER — HOSPITAL ENCOUNTER (OUTPATIENT)
Dept: CT IMAGING | Age: 66
Discharge: HOME | End: 2021-09-03
Payer: MEDICARE

## 2021-09-01 DIAGNOSIS — K92.1 HEMATOCHEZIA: ICD-10-CM

## 2021-09-01 PROCEDURE — 2580000003 HC RX 258: Performed by: RADIOLOGY

## 2021-09-01 PROCEDURE — 74177 CT ABD & PELVIS W/CONTRAST: CPT

## 2021-09-01 PROCEDURE — 6360000004 HC RX CONTRAST MEDICATION: Performed by: RADIOLOGY

## 2021-09-01 RX ORDER — 0.9 % SODIUM CHLORIDE 0.9 %
80 INTRAVENOUS SOLUTION INTRAVENOUS ONCE
Status: COMPLETED | OUTPATIENT
Start: 2021-09-01 | End: 2021-09-01

## 2021-09-01 RX ORDER — SODIUM CHLORIDE 0.9 % (FLUSH) 0.9 %
10 SYRINGE (ML) INJECTION ONCE
Status: COMPLETED | OUTPATIENT
Start: 2021-09-01 | End: 2021-09-01

## 2021-09-01 RX ADMIN — IOPAMIDOL 75 ML: 755 INJECTION, SOLUTION INTRAVENOUS at 07:57

## 2021-09-01 RX ADMIN — SODIUM CHLORIDE 80 ML: 9 INJECTION, SOLUTION INTRAVENOUS at 08:06

## 2021-09-01 RX ADMIN — SODIUM CHLORIDE, PRESERVATIVE FREE 10 ML: 5 INJECTION INTRAVENOUS at 08:06

## 2021-09-01 RX ADMIN — IOHEXOL 30 ML: 300 INJECTION, SOLUTION INTRAVENOUS at 08:06

## 2021-09-08 ENCOUNTER — HOSPITAL ENCOUNTER (OUTPATIENT)
Dept: RADIATION ONCOLOGY | Facility: MEDICAL CENTER | Age: 66
Discharge: HOME | End: 2021-09-08
Payer: MEDICARE

## 2021-09-08 ENCOUNTER — HOSPITAL ENCOUNTER (OUTPATIENT)
Dept: RADIATION ONCOLOGY | Facility: MEDICAL CENTER | Age: 66
End: 2021-09-08
Attending: RADIOLOGY
Payer: MEDICARE

## 2021-09-08 VITALS
TEMPERATURE: 96.3 F | RESPIRATION RATE: 16 BRPM | DIASTOLIC BLOOD PRESSURE: 106 MMHG | SYSTOLIC BLOOD PRESSURE: 177 MMHG | HEART RATE: 92 BPM | OXYGEN SATURATION: 98 % | BODY MASS INDEX: 19.17 KG/M2 | WEIGHT: 141.38 LBS

## 2021-09-08 DIAGNOSIS — K62.5 BRIGHT RED BLOOD PER RECTUM: Primary | ICD-10-CM

## 2021-09-08 PROCEDURE — 99212 OFFICE O/P EST SF 10 MIN: CPT | Performed by: RADIOLOGY

## 2021-09-08 PROCEDURE — 99999 PR OFFICE/OUTPT VISIT,PROCEDURE ONLY: CPT | Performed by: RADIOLOGY

## 2021-09-08 NOTE — PROGRESS NOTES
Augustina Charleston  9/8/2021  9:04 AM    Pt here for follow up. Had two ADT shots. PSA was done and dropped. Patient has catheter in still and complains of \"seepage around tube that is colored\" also urgency. Patient finished antibiotic yesterday. Dr Kurt Humphrey to eval. Order for colonoscopy placed. Pt will see Dr Jimmy Jose and return for follow up teach and sim. Encouraged to monitor any urinary symptoms. Vitals:    09/08/21 0901   BP: (S) (!) 177/106   Pulse: 92   Resp: 16   Temp: 96.3 °F (35.7 °C)   SpO2: 98%    :  Patient Currently in Pain: No             Wt Readings from Last 1 Encounters:   09/08/21 141 lb 6 oz (64.1 kg)                Current Outpatient Medications:     oxybutynin (DITROPAN) 5 MG tablet, Take 1 tablet by mouth 3 times daily as needed (bladder spasms), Disp: 90 tablet, Rfl: 3    NONFORMULARY, superbeta prostate BID, Disp: , Rfl:     sennosides-docusate sodium (SENOKOT-S) 8.6-50 MG tablet, Take 1 tablet by mouth daily, Disp: , Rfl:     CRANBERRY PO, Take by mouth daily 3 tabs daily, Disp: , Rfl:          *BREAST Patient only:    Lymphedema Eval:   [] left arm      [] right arm  Location:     Measurement (cm)    Upper Bicep :    Lower Bicep :         FALLS RISK SCREEN  Instructions:  Assess the patient and enter the appropriate indicators that are present for fall risk identification. Total the numbers entered and assign a fall risk score from Table 2.  Reassess patient at a minimum every 12 weeks or with status change. Assessment   Date  9/8/2021     1. Mental Ability: confusion/cognitively impaired 0     2. Elimination Issues: incontinence, frequency 3       3. Ambulatory: use of assistive devices (walker, cane, off-loading devices),        attached to equipment (IV pole, oxygen) 0     4. Sensory Limitations: dizziness, vertigo, impaired vision 0     5. Age less than 65        0     6. Age 72 or greater 1     7.   Medication: diuretics, strong analgesics, hypnotics, sedatives, antihypertensive agents 0   8. Falls:  recent history of falls within the last 3 months (not to include slipping or        tripping) 0   TOTAL 4    If score of 4 or greater was education given? No           TABLE 2   Risk Score Risk Level Plan of Care   0-3 Little or  No Risk 1. Provide assistance as indicated for ambulation activities  2. Reorient confused/cognitively impaired patient  3. Chair/bed in low position, stretcher/bed with siderails up except when performing patient care activities  5. Educate patient/family/caregiver on falls prevention  6.  Reassess in 12 weeks or with any noted change in patient condition which places them at a risk for a fall   4-6 Moderate Risk 1. Provide assistance as indicated for ambulation activities  2. Reorient confused/cognitively impaired patient  3. Chair/bed in low position, stretcher/bed with siderails up except when performing patient care activities  4. Educate patient/family/caregiver on falls prevention     7 or   Higher High Risk 1. Place patient in easily observable treatment room  2. Patient attended at all times by family member or staff  3. Provide assistance as indicated for ambulation activities  4. Reorient confused/cognitively impaired patient  5. Chair/bed in low position, stretcher/bed with siderails up except when performing patient care activities  6.   Educate patient/family/caregiver on falls prevention         PLAN: Patient is seen today in follow up        Abe Licona RN

## 2021-09-09 ENCOUNTER — TELEPHONE (OUTPATIENT)
Dept: GASTROENTEROLOGY | Age: 66
End: 2021-09-09

## 2021-09-09 ENCOUNTER — TELEPHONE (OUTPATIENT)
Dept: ONCOLOGY | Age: 66
End: 2021-09-09

## 2021-09-09 DIAGNOSIS — K62.5 BRIGHT RED BLOOD PER RECTUM: ICD-10-CM

## 2021-09-09 DIAGNOSIS — Z12.11 COLON CANCER SCREENING: Primary | ICD-10-CM

## 2021-09-09 NOTE — TELEPHONE ENCOUNTER
Patient's Daughter Vilma Garcia called to re-schedule patients procedure. Patient scheduled with Dr Kim Guerin on [email or web address]. Please call to reschedule.

## 2021-09-09 NOTE — TELEPHONE ENCOUNTER
Writer spoke to pt daughter Brunilda Phelan who's inquiring about when and who will schedule her Dads colonoscopy. Writer informs her that referral is in and that office will reach out to patient to schedule. Pt is asking if they will call his daughter for scheduling since she will be his . Writer called [facility] and updated staff to call Brunilda Phelan when theyre ready to schedule. Also updated that pt second COVID vaccine is next Wednesday and will have to be atleast a week after that. Writer updated pt daughter Brunilda Phelan and she is appreciative for help. Will continue to follow.

## 2021-09-09 NOTE — TELEPHONE ENCOUNTER
Writer returned Libby's phone call, but there was no answer and her vm is not set up; unable to leave a msg.

## 2021-09-09 NOTE — TELEPHONE ENCOUNTER
Spoke w/[facility] Oncology Nurse Navigator) and adv pt needs to schedule a colonoscopy w/, requesting us to speak with daughter Shea Valdez [phone] to schedule the colonoscopy. She is being added to the [place] per Jose David Metcalf. Pt is getting his 2nd dose of the Covid vaccine next week. Treatment plan for radiation begins 10/18 and will need to be scheduled prior to that.

## 2021-09-09 NOTE — TELEPHONE ENCOUNTER
Writer spoke to Fatoumata Thornton, pt's daughter, over the phone and after going colon screen questionnaire w/ Fatoumata Thornton, pt is sched w/ Marlin at [address] 10/6/21 @ 9:30am proc time, 7:30am arrival time. Golytely order will be sent to [address] [place] [address] on [address], [address]. Bowel prep instructions given to Fatoumata Thornton over the phone and hard copy will be e-mailed to [email or web address] Fatoumata Thornton instructed pt will need a  and to bring proof of Covid-19 vaccinations on day of proc.  (pt's 2nd vaccination is sched 9/16/21). PAT phone call is sched 9/24/21 @ 10:45am.  Fatoumata Thornton voices her understanding.

## 2021-09-10 ENCOUNTER — TELEPHONE (OUTPATIENT)
Dept: RADIATION ONCOLOGY | Facility: MEDICAL CENTER | Age: 66
End: 2021-09-10

## 2021-09-10 RX ORDER — POLYETHYLENE GLYCOL 3350, SODIUM SULFATE ANHYDROUS, SODIUM BICARBONATE, SODIUM CHLORIDE, POTASSIUM CHLORIDE 236; 22.74; 6.74; 5.86; 2.97 G/4L; G/4L; G/4L; G/4L; G/4L
4 POWDER, FOR SOLUTION ORAL ONCE
Qty: 4000 ML | Refills: 0 | Status: SHIPPED | OUTPATIENT
Start: 2021-09-10 | End: 2021-09-10

## 2021-09-10 NOTE — TELEPHONE ENCOUNTER
Writer returned phone call to Sophia Walsh and spoke to her in regards to rescheduling 10/6/21 colon proc. Pt wants to go to [facility] instead of [facility]. Sophia Walsh did a three way call between [facility] and pt. Pt is resched w/ Marlin at [facility] 10/8/21 @ 7:45am proc time, 6:15am arrival time. Writer informed Libby & pt that pt's bowel prep was changed to Golytely and prep instructions given to Sophia Walsh & pt over the phone. Hard copy of instructions will be e-mailed to [email or web address] Pt & Sophia Walsh reminded pt will need a  and to bring proof of covid-19 vaccination on day of proc. They were also informed that they will not need a PAT PC and the 9/24/21 @ 10:45am call has been cancelled. They voice their understanding.

## 2021-09-10 NOTE — TELEPHONE ENCOUNTER
Received call from José Luis Burgess, pts daughter, stating the GI called them to schedule colonoscopy but they will only do at [facility]. Pt refuses to go to [facility], and wants to go to [address] [place] [address], which GI said they will not go there. Daughter is requesting a new referral to diff GI.  Note given to Kan Ventura

## 2021-09-15 ENCOUNTER — TELEPHONE (OUTPATIENT)
Dept: RADIATION ONCOLOGY | Facility: MEDICAL CENTER | Age: 66
End: 2021-09-15

## 2021-09-15 NOTE — TELEPHONE ENCOUNTER
Jaycee Bamberger called asking us to place a new rx to the pharmacy for the colonoscopy. I called her back and informed her we did not give a prescription and she would need to contact Dr Valeria Suggs office. I provided her with the phone number.

## 2021-09-16 NOTE — TELEPHONE ENCOUNTER
Writer spoke to Kristie Shin from [address] and she confirms they do have the generic for Golytely in stock.  She states they take all insurances unless they are not contracted and if it is not covered, it has a cost of $27.49 and they have Good Rx prescription program.

## 2021-09-16 NOTE — TELEPHONE ENCOUNTER
Writer called & spoke to pt's dtr, Jade Rickygarry, over the phone informing her we sent Golytely order to his pharmacy. She states that the pharmacy is out of stock and they need it sent to another pharmacy. Jade Matson states she spoke w/ [facility] on [address] [place], [place] and they do have the Golytely in stock, but she wasn't sure if they took MON[place]J. She said we could also try [facility] at [facility], [place]. Writer informed Jade Matson we would call and find out information and we will call her back & let her know where we send the Golytely order. Jade Matson voices her understanding.

## 2021-09-16 NOTE — TELEPHONE ENCOUNTER
A female left a message stating that the prep given he cannot take due to kidney issues. It was suggested for him to take golytely. Please follow up.

## 2021-09-17 RX ORDER — POLYETHYLENE GLYCOL 3350, SODIUM SULFATE ANHYDROUS, SODIUM BICARBONATE, SODIUM CHLORIDE, POTASSIUM CHLORIDE 236; 22.74; 6.74; 5.86; 2.97 G/4L; G/4L; G/4L; G/4L; G/4L
4 POWDER, FOR SOLUTION ORAL ONCE
Qty: 4000 ML | Refills: 0 | Status: SHIPPED | OUTPATIENT
Start: 2021-09-17 | End: 2021-09-17

## 2021-10-08 ENCOUNTER — HOSPITAL ENCOUNTER (OUTPATIENT)
Age: 66
Setting detail: OUTPATIENT SURGERY
Discharge: HOME | End: 2021-10-08
Attending: INTERNAL MEDICINE | Admitting: INTERNAL MEDICINE
Payer: MEDICARE

## 2021-10-08 ENCOUNTER — ANESTHESIA (OUTPATIENT)
Dept: OPERATING ROOM | Age: 66
End: 2021-10-08
Payer: MEDICARE

## 2021-10-08 ENCOUNTER — ANESTHESIA EVENT (OUTPATIENT)
Dept: OPERATING ROOM | Age: 66
End: 2021-10-08
Payer: MEDICARE

## 2021-10-08 VITALS
OXYGEN SATURATION: 100 % | DIASTOLIC BLOOD PRESSURE: 59 MMHG | SYSTOLIC BLOOD PRESSURE: 93 MMHG | RESPIRATION RATE: 17 BRPM

## 2021-10-08 VITALS
SYSTOLIC BLOOD PRESSURE: 174 MMHG | DIASTOLIC BLOOD PRESSURE: 81 MMHG | HEART RATE: 78 BPM | OXYGEN SATURATION: 98 % | HEIGHT: 71 IN | RESPIRATION RATE: 16 BRPM | TEMPERATURE: 97.3 F | WEIGHT: 140 LBS | BODY MASS INDEX: 19.6 KG/M2

## 2021-10-08 PROCEDURE — 2709999900 HC NON-CHARGEABLE SUPPLY: Performed by: INTERNAL MEDICINE

## 2021-10-08 PROCEDURE — 45378 DIAGNOSTIC COLONOSCOPY: CPT | Performed by: INTERNAL MEDICINE

## 2021-10-08 PROCEDURE — 7100000010 HC PHASE II RECOVERY - FIRST 15 MIN: Performed by: INTERNAL MEDICINE

## 2021-10-08 PROCEDURE — 3700000001 HC ADD 15 MINUTES (ANESTHESIA): Performed by: INTERNAL MEDICINE

## 2021-10-08 PROCEDURE — 3700000000 HC ANESTHESIA ATTENDED CARE: Performed by: INTERNAL MEDICINE

## 2021-10-08 PROCEDURE — 2580000003 HC RX 258: Performed by: NURSE ANESTHETIST, CERTIFIED REGISTERED

## 2021-10-08 PROCEDURE — 2500000003 HC RX 250 WO HCPCS: Performed by: NURSE ANESTHETIST, CERTIFIED REGISTERED

## 2021-10-08 PROCEDURE — 7100000011 HC PHASE II RECOVERY - ADDTL 15 MIN: Performed by: INTERNAL MEDICINE

## 2021-10-08 PROCEDURE — 6360000002 HC RX W HCPCS: Performed by: NURSE ANESTHETIST, CERTIFIED REGISTERED

## 2021-10-08 PROCEDURE — 3609027000 HC COLONOSCOPY: Performed by: INTERNAL MEDICINE

## 2021-10-08 RX ORDER — FENTANYL CITRATE 50 UG/ML
25 INJECTION, SOLUTION INTRAMUSCULAR; INTRAVENOUS EVERY 5 MIN PRN
Status: DISCONTINUED | OUTPATIENT
Start: 2021-10-08 | End: 2021-10-08 | Stop reason: HOSPADM

## 2021-10-08 RX ORDER — SODIUM CHLORIDE 9 MG/ML
INJECTION, SOLUTION INTRAVENOUS CONTINUOUS
Status: DISCONTINUED | OUTPATIENT
Start: 2021-10-08 | End: 2021-10-08 | Stop reason: HOSPADM

## 2021-10-08 RX ORDER — ONDANSETRON 2 MG/ML
4 INJECTION INTRAMUSCULAR; INTRAVENOUS
Status: DISCONTINUED | OUTPATIENT
Start: 2021-10-08 | End: 2021-10-08 | Stop reason: HOSPADM

## 2021-10-08 RX ORDER — SODIUM CHLORIDE, SODIUM LACTATE, POTASSIUM CHLORIDE, CALCIUM CHLORIDE 600; 310; 30; 20 MG/100ML; MG/100ML; MG/100ML; MG/100ML
INJECTION, SOLUTION INTRAVENOUS CONTINUOUS
Status: DISCONTINUED | OUTPATIENT
Start: 2021-10-08 | End: 2021-10-08 | Stop reason: HOSPADM

## 2021-10-08 RX ORDER — SODIUM CHLORIDE 0.9 % (FLUSH) 0.9 %
10 SYRINGE (ML) INJECTION PRN
Status: DISCONTINUED | OUTPATIENT
Start: 2021-10-08 | End: 2021-10-08 | Stop reason: HOSPADM

## 2021-10-08 RX ORDER — SODIUM CHLORIDE 0.9 % (FLUSH) 0.9 %
10 SYRINGE (ML) INJECTION EVERY 12 HOURS SCHEDULED
Status: DISCONTINUED | OUTPATIENT
Start: 2021-10-08 | End: 2021-10-08 | Stop reason: HOSPADM

## 2021-10-08 RX ORDER — LIDOCAINE HYDROCHLORIDE 10 MG/ML
1 INJECTION, SOLUTION EPIDURAL; INFILTRATION; INTRACAUDAL; PERINEURAL
Status: DISCONTINUED | OUTPATIENT
Start: 2021-10-08 | End: 2021-10-08 | Stop reason: HOSPADM

## 2021-10-08 RX ORDER — PROPOFOL 10 MG/ML
INJECTION, EMULSION INTRAVENOUS PRN
Status: DISCONTINUED | OUTPATIENT
Start: 2021-10-08 | End: 2021-10-08 | Stop reason: SDUPTHER

## 2021-10-08 RX ORDER — SODIUM CHLORIDE 9 MG/ML
25 INJECTION, SOLUTION INTRAVENOUS PRN
Status: DISCONTINUED | OUTPATIENT
Start: 2021-10-08 | End: 2021-10-08 | Stop reason: HOSPADM

## 2021-10-08 RX ORDER — LIDOCAINE HYDROCHLORIDE 20 MG/ML
INJECTION, SOLUTION EPIDURAL; INFILTRATION; INTRACAUDAL; PERINEURAL PRN
Status: DISCONTINUED | OUTPATIENT
Start: 2021-10-08 | End: 2021-10-08 | Stop reason: SDUPTHER

## 2021-10-08 RX ORDER — SODIUM CHLORIDE, SODIUM LACTATE, POTASSIUM CHLORIDE, CALCIUM CHLORIDE 600; 310; 30; 20 MG/100ML; MG/100ML; MG/100ML; MG/100ML
INJECTION, SOLUTION INTRAVENOUS CONTINUOUS PRN
Status: DISCONTINUED | OUTPATIENT
Start: 2021-10-08 | End: 2021-10-08 | Stop reason: SDUPTHER

## 2021-10-08 RX ORDER — HYDROMORPHONE HYDROCHLORIDE 1 MG/ML
0.25 INJECTION, SOLUTION INTRAMUSCULAR; INTRAVENOUS; SUBCUTANEOUS EVERY 5 MIN PRN
Status: DISCONTINUED | OUTPATIENT
Start: 2021-10-08 | End: 2021-10-08 | Stop reason: HOSPADM

## 2021-10-08 RX ADMIN — PROPOFOL 20 MG: 10 INJECTION, EMULSION INTRAVENOUS at 08:11

## 2021-10-08 RX ADMIN — LIDOCAINE HYDROCHLORIDE 60 MG: 20 INJECTION, SOLUTION EPIDURAL; INFILTRATION; INTRACAUDAL; PERINEURAL at 08:07

## 2021-10-08 RX ADMIN — SODIUM CHLORIDE, POTASSIUM CHLORIDE, SODIUM LACTATE AND CALCIUM CHLORIDE: 600; 310; 30; 20 INJECTION, SOLUTION INTRAVENOUS at 08:04

## 2021-10-08 RX ADMIN — PROPOFOL 20 MG: 10 INJECTION, EMULSION INTRAVENOUS at 08:16

## 2021-10-08 RX ADMIN — PROPOFOL 50 MG: 10 INJECTION, EMULSION INTRAVENOUS at 08:07

## 2021-10-08 ASSESSMENT — PULMONARY FUNCTION TESTS
PIF_VALUE: 1

## 2021-10-08 ASSESSMENT — LIFESTYLE VARIABLES: SMOKING_STATUS: 1

## 2021-10-08 ASSESSMENT — PAIN DESCRIPTION - LOCATION: LOCATION: GROIN

## 2021-10-08 ASSESSMENT — PAIN SCALES - GENERAL
PAINLEVEL_OUTOF10: 0
PAINLEVEL_OUTOF10: 0
PAINLEVEL_OUTOF10: 1
PAINLEVEL_OUTOF10: 0
PAINLEVEL_OUTOF10: 0

## 2021-10-08 ASSESSMENT — PAIN DESCRIPTION - PAIN TYPE: TYPE: CHRONIC PAIN

## 2021-10-08 ASSESSMENT — PAIN DESCRIPTION - ORIENTATION: ORIENTATION: RIGHT

## 2021-10-08 ASSESSMENT — PAIN DESCRIPTION - DESCRIPTORS: DESCRIPTORS: ACHING

## 2021-10-08 NOTE — ANESTHESIA POSTPROCEDURE EVALUATION
Department of Anesthesiology  Postprocedure Note    Patient: Kimmy Daniels  MRN: [id number]  [place]: 1955  Date of evaluation: 10/8/2021  Time:  1:39 PM     Procedure Summary     Date: 10/08/21 Room / Location: Nikolas Ewing  / [facility] - INPATIENT    Anesthesia Start: 6079 Anesthesia Stop: 0830    Procedure: COLORECTAL CANCER SCREENING, NOT HIGH RISK (N/A Anus) Diagnosis: (DX SCREENING)    Surgeons: Francisco Rothman MD Responsible Provider: Silvia Lowe MD    Anesthesia Type: general, MAC ASA Status: 2          Anesthesia Type: general, MAC    Devante Phase I:      Devante Phase II: Devante Score: 8    Last vitals: Reviewed and per EMR flowsheets.        Anesthesia Post Evaluation    Patient location during evaluation: PACU  Patient participation: complete - patient participated  Level of consciousness: awake  Airway patency: patent  Nausea & Vomiting: no nausea  Complications: no  Cardiovascular status: blood pressure returned to baseline  Respiratory status: acceptable  Hydration status: euvolemic

## 2021-10-08 NOTE — H&P
History and Physical Service   [place] [id number]    HISTORY AND PHYSICAL EXAMINATION            Date of Evaluation: 10/8/2021  Patient name:  David De Leon  MRN:   [id number]  YOB: 1955  PCP:    Justyna Lorenz MD    History Obtained From:     Patient, medical records     History of Present Illness: This is David De Leon a 74 y.o. male who presents today for a colorectal cancer screening  by Dr. Gianna Cervantes for colorectal cancer screening. The patient completed the prep as directed until watery clear. Bowel movements are harder and takes senokot and has hemorrhoids with occasional blood when wiping. No FH colon cancer or polyps. No previous colonoscopy. Patient in the past 6 months was diagnosed with prostate cancer and has RLQ discomfort which he states are \"meng lymph nodes\". He follows with Dr Juan Sparks and Oncologist, Dr Anne Cain (refer to epic for notes) He has a monge catheter in place. The patient smokes with occasional cough and has nasal congestion  Today denies bloody tarry stools, diarrhea alternating with constipation, fever, chills, night sweats, pain or unexplained weight loss.      Past Medical History:     Past Medical History:   Diagnosis Date    Bilateral hydronephrosis     BPH (benign prostatic hyperplasia)     Cancer (HCC)     prostate    GERD (gastroesophageal reflux disease)     Hemorrhoid     Hypertension     PSA elevation     UTI (urinary tract infection)     Weight loss         Past Surgical History:     Past Surgical History:   Procedure Laterality Date    BACK SURGERY      CYSTOSCOPY N/A 5/12/2021    CYSTOSCOPY BLADDER IRRIGATION CLOT EVACUATION performed by Justyna Lorenz MD at [facility] N/A 6/18/2021    CYSTOSCOPY PROSTATE BIOPSY WITH U/S performed by Justyna Lorenz MD at [address] [facility]  6/18/2021    US PROSTATE NEEDLE PUNCH 6/18/2021 [facility] ULTRASOUND Medications Prior to Admission:     Prior to Admission medications    Medication Sig Start Date End Date Taking? Authorizing Provider   oxybutynin (DITROPAN) 5 MG tablet Take 1 tablet by mouth 3 times daily as needed (bladder spasms) 5/15/21  Yes Noreene Hasting Orlop, DO   sennosides-docusate sodium (SENOKOT-S) 8.6-50 MG tablet Take 1 tablet by mouth daily   Yes Historical Provider, MD   CRANBERRY PO Take by mouth daily 3 tabs daily   Yes Historical Provider, MD   NONFORMULARY superbeta prostate BID    Historical Provider, MD        Allergies:     Latex and Macrobid [nitrofurantoin]    Social History:     Tobacco:    reports that he has been smoking cigarettes. He has been smoking about 2.00 packs per day. He has never used smokeless tobacco.  Alcohol:      reports previous alcohol use. Drug Use:  reports no history of drug use. Family History:     Family History   Problem Relation Age of Onset   [facility] Cancer Mother     Cancer Sister        Review of Systems:     Positive and Negative as described in HPI. CONSTITUTIONAL:  negative for fevers, chills, sweats, fatigue, weight loss  HEENT: nasal congestion  negative for vision, hearing changes, runny nose, throat pain  RESPIRATORY:  Smoker negative for shortness of breath, cough, congestion, wheezing. CARDIOVASCULAR:  negative for chest pain, palpitations. GASTROINTESTINAL: acid reflux, + right lower abdominal pain He states \"I have angry lymph nodes from my prostate cancer\".  See HPI  negative for nausea, vomiting, diarrhea, change in bowel habits  GENITOURINARY:  Hx prostate cancer and has monge catheter  Follows with Dr Ralph Reid and Oncologist Dr Anthony Fitch negative for difficulty of urination, burning with urination, frequency   INTEGUMENT:  negative for rash, skin lesions, easy bruising   HEMATOLOGIC/LYMPHATIC:  negative for swelling/edema   ALLERGIC/IMMUNOLOGIC:  negative for urticaria , itching  ENDOCRINE:  negative increase in drinking, increase in urination, hot or cold intolerance  MUSCULOSKELETAL:  negative joint pains, muscle aches, swelling of joints  NEUROLOGICAL:  negative for headaches, dizziness, lightheadedness, numbness, pain, tingling extremities  BEHAVIOR/PSYCH:  negative for depression, anxiety    Physical Exam:   BP (!) 148/86   Pulse 114   Temp 96.6 °F (35.9 °C) (Temporal)   Resp 16   Ht 5' 11\" (1.803 m)   Wt 140 lb (63.5 kg)   SpO2 98%   BMI 19.53 kg/m²       General Appearance: thin male,  alert, well appearing, and in no acute distress  Mental status: oriented to person, place, and time with normal affect  Head:  normocephalic, atraumatic. Eye: no icterus, redness, pupils equal and reactive, extraocular eye movements intact, conjunctiva clear  Ear: normal external ear, no discharge, hearing intact  Nose:  no drainage noted  Mouth: mucous membranes moist  Neck: supple, no carotid bruits, thyroid not palpable  Lungs: Bilateral equal air entry, clear to ausculation, no wheezing, rales or rhonchi, normal effort  No CVA tenderness   Cardiovascular:  asymptomatic tachycardic rate Apical  at present, regular rhythm, no murmur, gallop, rub. Abdomen: mild right lower quadrant pain  Paige catheter to bag draining light yellow urine with sediment Soft, nontender, nondistended, normal bowel sounds, no hepatomegaly or splenomegaly  Neurologic: There are no new focal motor or sensory deficits, normal muscle tone and bulk, no abnormal sensation, normal speech, cranial nerves II through XII grossly intact  Skin: No gross lesions, rashes, bruising or bleeding on exposed skin area  Extremities:  peripheral pulses palpable, no pedal edema or calf pain with palpation  Psych: normal affect     Investigations:      Laboratory Testing:  No results for input(s): COVID19 in the last 720 hours. No results for input(s): POCGLU in the last 72 hours. No results found for this or any previous visit (from the past 24 hour(s)).     No results for input(s): HGB, HCT, WBC, MCV, PLATELET, NA, K, CL, CO2, BUN, CREATININE, GLUCOSE, INR, PROTIME, APTT, AST, ALT, LABALBU, HCG in the last 720 hours. Imaging/Diagnostics    Diagnosis:      1. Colon screening    Plans:     1.  Colorectal cancer screening      FARHAD Noel - CNP  10/8/2021  7:14 AM

## 2021-10-08 NOTE — ANESTHESIA PRE PROCEDURE
Department of Anesthesiology  Preprocedure Note       Name:  Mariah Sample   Age:  74 y.o.  :  1955                                          MRN:  [id number]         Date:  10/8/2021      Surgeon: Juan Oviedo):  Shereen Smith MD    Procedure: Procedure(s):  COLORECTAL CANCER SCREENING, NOT HIGH RISK    Medications prior to admission:   Prior to Admission medications    Medication Sig Start Date End Date Taking? Authorizing Provider   oxybutynin (DITROPAN) 5 MG tablet Take 1 tablet by mouth 3 times daily as needed (bladder spasms) 5/15/21   Salome Barbosa DO   NONFORMULARY superbeta prostate BID    Historical Provider, MD   sennosides-docusate sodium (SENOKOT-S) 8.6-50 MG tablet Take 1 tablet by mouth daily    Historical Provider, MD   CRANBERRY PO Take by mouth daily 3 tabs daily    Historical Provider, MD       Current medications:    No current facility-administered medications for this visit. No current outpatient medications on file. Facility-Administered Medications Ordered in Other Visits   Medication Dose Route Frequency Provider Last Rate Last Admin    0.9 % sodium chloride infusion   IntraVENous Continuous Fritz Lira MD        lactated ringers infusion   IntraVENous Continuous Fritz Lira MD        sodium chloride flush 0.9 % injection 10 mL  10 mL IntraVENous 2 times per day Harvey Burrell MD        sodium chloride flush 0.9 % injection 10 mL  10 mL IntraVENous PRN Fritz Lira MD        0.9 % sodium chloride infusion  25 mL IntraVENous PRN Harvey Burrell MD        lidocaine PF 1 % injection 1 mL  1 mL IntraDERmal Once PRN Harvey Burrell MD           Allergies:     Allergies   Allergen Reactions    Latex     Macrobid [Nitrofurantoin]      High intolerance - projectile diarrhea       Problem List:    Patient Active Problem List   Diagnosis Code    Gross hematuria R31.0    Heavy cigarette smoker (20-39 per day) F17.210    Family history of stomach cancer Z80.0    Family history of Alzheimer's disease Z82.0    Anxiety about health F41.8    Elevated PSA R97.20    Bilateral hydronephrosis due to bladder outlet obstruction N13.30    SANDEEP (acute kidney injury) ([address] N17.9    BPH with obstruction/lower urinary tract symptoms N40.1, N13.8    Severe malnutrition (HCC) E43    Bright red blood per rectum K62.5       Past Medical History:        Diagnosis Date    Bilateral hydronephrosis     BPH (benign prostatic hyperplasia)     Cancer (HCC)     prostate    GERD (gastroesophageal reflux disease)     Hemorrhoid     Hypertension     PSA elevation     UTI (urinary tract infection)     Weight loss        Past Surgical History:        Procedure Laterality Date    BACK SURGERY      CYSTOSCOPY N/A 5/12/2021    CYSTOSCOPY BLADDER IRRIGATION CLOT EVACUATION performed by Lindsey Betancourt MD at [address] [facility] [address] N/A 6/18/2021    CYSTOSCOPY PROSTATE BIOPSY WITH U/S performed by Lindsey Betancourt MD at [address]  6/18/2021    US PROSTATE NEEDLE PUNCH 6/18/2021 [facility] ULTRASOUND       Social History:    Social History     Tobacco Use    Smoking status: Current Every Day Smoker     Packs/day: 2.00     Types: Cigarettes    Smokeless tobacco: Never Used   Substance Use Topics    Alcohol use: Not Currently                                Ready to quit: Not Answered  Counseling given: Not Answered      Vital Signs (Current): There were no vitals filed for this visit.                                            BP Readings from Last 3 Encounters:   10/08/21 (!) 148/86   09/08/21 (S) (!) 177/106   08/23/21 (!) 172/98       NPO Status:                                                                                 BMI:   Wt Readings from Last 3 Encounters:   10/08/21 140 lb (63.5 kg)   09/08/21 141 lb 6 oz (64.1 kg)   08/23/21 142 lb 8 oz (64.6 kg)     There is no height or weight on file to calculate BMI.    CBC:   Lab Results Component Value Date    WBC 10.3 05/15/2021    RBC 2.96 05/15/2021    HGB 8.2 05/15/2021    HCT 26.2 05/15/2021    MCV 88.5 05/15/2021    RDW 13.5 05/15/2021     05/15/2021       CMP:   Lab Results   Component Value Date     05/15/2021    K 3.5 05/15/2021     05/15/2021    CO2 19 05/15/2021    BUN 12 05/15/2021    CREATININE 1.83 05/15/2021    GFRAA 45 05/15/2021    LABGLOM 37 05/15/2021    GLUCOSE 103 05/15/2021    CALCIUM 8.6 05/15/2021       POC Tests: No results for input(s): POCGLU, POCNA, POCK, POCCL, POCBUN, POCHEMO, POCHCT in the last 72 hours. Coags: No results found for: PROTIME, INR, APTT    HCG (If Applicable): No results found for: PREGTESTUR, PREGSERUM, HCG, HCGQUANT     ABGs: No results found for: PHART, PO2ART, TVS3SUH, IOA7HQC, BEART, I7OIUPFU     Type & Screen (If Applicable):  No results found for: LABABO, LABRH    Drug/Infectious Status (If Applicable):  No results found for: HIV, HEPCAB    COVID-19 Screening (If Applicable):   Lab Results   Component Value Date    COVID19 Not Detected 06/14/2021           Anesthesia Evaluation  Patient summary reviewed and Nursing notes reviewed no history of anesthetic complications:   Airway: Mallampati: I  TM distance: >3 FB   Neck ROM: limited  Mouth opening: > = 3 FB Dental:    (+) edentulous      Pulmonary:normal exam    (+) COPD:  current smoker                           Cardiovascular:  Exercise tolerance: good (>4 METS),   (+) hypertension:,     (-)  angina                Neuro/Psych:               GI/Hepatic/Renal:   (+) GERD:,           Endo/Other: Negative Endo/Other ROS                    Abdominal:             Vascular: Other Findings:             Anesthesia Plan      TIVA     ASA 2     (Has monge)  Induction: intravenous. MIPS: Prophylactic antiemetics administered. Anesthetic plan and risks discussed with patient. Plan discussed with CRNA.     Attending anesthesiologist reviewed and agrees with Preprocedure content          Jamey Cuba MD   10/8/2021

## 2021-10-08 NOTE — OP NOTE
Operative Note  PROCEDURE NOTE    DATE OF PROCEDURE: 10/8/2021    SURGEON: Lilian Dent MD  Facility : [facility]  ASSISTANT: None  Anesthesia: MAC   PREOPERATIVE DIAGNOSIS:   Screening  Prostate cancer to rule out invasion    POSTOPERATIVE DIAGNOSIS: as described below    OPERATION: Total colonoscopy     ANESTHESIA: Moderate Sedation    ESTIMATED BLOOD LOSS: less than 50     COMPLICATIONS: None. SPECIMENS:  Was Not Obtained    HISTORY: The patient is a 74y.o. year old male with history of above preop diagnosis. I recommended colonoscopy with possible biopsy or polypectomy and I explained the risk, benefits, expected outcome, and alternatives to the procedure. Risks included but are not limited to bleeding, infection, respiratory distress, hypotension, and perforation of the colon and possibility of missing a lesion. The patient understands and is in agreement. The patient was counseled at length about the risks of kajal Covid-19 during their perioperative period and any recovery window from their procedure. The patient was made aware that kajal Covid-19  may worsen their prognosis for recovering from their procedure  and lend to a higher morbidity and/or mortality risk. All material risks, benefits, and reasonable alternatives including postponing the procedure were discussed. The patient does wish to proceed with the procedure at this time. PROCEDURE: The patient was given IV conscious sedation. The patient's SPO2 remained above 90% throughout the procedure. The colonoscope was inserted per rectum and advanced under direct vision to the cecum without difficulty. Post sedation note : The patient's SPO2 remained above 90% throughout the procedure. the vital signs remained stable , and no immediate complication form the procedure noted, patient will be ready for d/c when criteria is met . The prep was fair.       Findings:  Terminal ileum: normal    Cecum/Ascending colon: normal    Transverse colon: normal    Descending/Sigmoid colon: normal    Rectum/Anus: examined in normal and retroflexed positions and was abnormal: Engorged and congested internal hemorrhoids, no invasion noted    Withdrawal Time was (minutes): 10    The colon was decompressed and the scope was removed. The patient tolerated the procedure well. Recommendations/Plan:   1. Lifestyle and dietary modifications as discussed  2. F/U Biopsies  3. F/U In OfficeYes  4. Discussed with the family  5.  Repeat colonoscopy [id number]    Electronically signed by Army Alexis MD  on 10/8/2021 at 8:21 AM

## 2021-10-11 ENCOUNTER — TELEPHONE (OUTPATIENT)
Dept: RADIATION ONCOLOGY | Facility: MEDICAL CENTER | Age: 66
End: 2021-10-11

## 2021-10-11 NOTE — TELEPHONE ENCOUNTER
Per instructions from Dr Dave Barron called Jayne Birch to ask him to change his follow up with teach & sim to this week.  I spoke with his daughter and she agreed to change it to Thursday 10/14/2021 @ 10:30am

## 2021-10-14 ENCOUNTER — TELEPHONE (OUTPATIENT)
Dept: ONCOLOGY | Age: 66
End: 2021-10-14

## 2021-10-14 ENCOUNTER — HOSPITAL ENCOUNTER (OUTPATIENT)
Dept: RADIATION ONCOLOGY | Facility: MEDICAL CENTER | Age: 66
Discharge: HOME | End: 2021-10-14
Attending: RADIOLOGY
Payer: MEDICARE

## 2021-10-14 VITALS
DIASTOLIC BLOOD PRESSURE: 108 MMHG | OXYGEN SATURATION: 99 % | RESPIRATION RATE: 16 BRPM | WEIGHT: 142.25 LBS | BODY MASS INDEX: 19.84 KG/M2 | HEART RATE: 101 BPM | SYSTOLIC BLOOD PRESSURE: 171 MMHG

## 2021-10-14 DIAGNOSIS — C61 MALIGNANT NEOPLASM OF PROSTATE (HCC): ICD-10-CM

## 2021-10-14 PROCEDURE — 99212 OFFICE O/P EST SF 10 MIN: CPT | Performed by: STUDENT IN AN ORGANIZED HEALTH CARE EDUCATION/TRAINING PROGRAM

## 2021-10-14 PROCEDURE — 77263 THER RADIOLOGY TX PLNG CPLX: CPT | Performed by: STUDENT IN AN ORGANIZED HEALTH CARE EDUCATION/TRAINING PROGRAM

## 2021-10-14 PROCEDURE — 77334 RADIATION TREATMENT AID(S): CPT | Performed by: STUDENT IN AN ORGANIZED HEALTH CARE EDUCATION/TRAINING PROGRAM

## 2021-10-14 RX ORDER — FEXOFENADINE HCL 180 MG/1
180 TABLET ORAL DAILY
COMMUNITY

## 2021-10-14 ASSESSMENT — PAIN DESCRIPTION - PAIN TYPE: TYPE: ACUTE PAIN

## 2021-10-14 ASSESSMENT — PAIN DESCRIPTION - ORIENTATION: ORIENTATION: RIGHT

## 2021-10-14 ASSESSMENT — PAIN DESCRIPTION - FREQUENCY: FREQUENCY: INTERMITTENT

## 2021-10-14 ASSESSMENT — PAIN DESCRIPTION - LOCATION: LOCATION: ABDOMEN

## 2021-10-14 ASSESSMENT — PAIN SCALES - GENERAL: PAINLEVEL_OUTOF10: 2

## 2021-10-14 NOTE — PROGRESS NOTES
Radiation Oncology Office Note    Diagnosis/Treatment History:   Mr. Mony Lopez is a 74 y.o. male with NCCN high-risk prostate adenocarcinoma ([id number], [place] 4+3, PSA 54.65). CT abdomen/ pelvis on 2021 and bone scan on 2021 negative for regional or distant metastasis. Patient is s/p monge catheter placement at the direction of Dr. Chaz Kim for persistent urinary obstruction. Patient is s/p ADT with eligard 7.5 mg on 2021, 2021 and 2021. Interval History:   Mr. Mony Lopez returns with his daughter for routine follow-up. Overall, he reports that he is doing well with his monge. He has normal bowel movements. He recently had a colonoscopy which revealed severe hemmoroids. Physical Examination:   BP (!) 171/108   Pulse 101   Resp 16   Wt 142 lb 4 oz (64.5 kg)   SpO2 99%   BMI 19.84 kg/m²   ECO Symptomatic but completely ambulatory  CONSTITUTIONAL: Well developed, well nourished male. Alert and oriented x3. No acute distress. HEENT: Head normocephalic and atraumatic. Extraocular movements intact. NEUROLOGIC EXAM: Cranial nerves II through XII grossly intact. No focal neurologic deficit. Speech is fluent. Gait and posture are steady. PSYCHIATRIC:  Appropriate mood and affect for his clinical situation. Assessment/Plan:  --- I discussed at length with Mr. Mony Lopez his diagnosis and the nature of high-risk prostate adenocarcinoma. This included how considerations such as PSA, Sánchez score, the amount of cancer present on the biopsy report, clinical exam, among other things can influence recommendations. --- I explained different treatment strategies, including surgery versus radiation therapy (including short discussion on brachytherapy), hormonal therapy, as well as differences of active surveillance and watchful waiting. This included pros and cons of the various options; balancing likelihood of successfully management vs possible side effects.  I had a print out of NCCN guidelines prostate cancer, to further facilitate discussion. We explored that with a life expectancy greater than 5 yrs, the national guidelines recommend some form of definitive treatment; either surgery or radiation ± ADT (long course). --- I explained in detail the logistics of EBRT including the planning simulation followed by treatment for approximately 6 weeks. We discussed the potential early and late side effects of RT which include but are not limited to obstructive or irritative bladder symptoms, loose stools or diarrhea and fatigue. In the long term there is risk of bleeding from rectum or bladder and decreased sexual function. --- We also discussed the logistics and potential risks, and benefits of hormonal therapy. --- Mr. Nasima Zambrano seemed to understand our conversation well. He asked very appropriate questions which were answered to the best of our ability and to their apparent satisfaction. --- He would like to proceed with definitive radiation treatment. Informed consent was obtained. --- A CT simulation was subsequently performed. He was instructed to arrive with a full bladder and an empty rectum for the simulation and daily radiation treatments. Lastly, I discussed the case with the patient's urologist, Dr. César Hinds. He would like the patient to remain with the monge catheter, due to his persistent issues with urinary obstruction. He will then evaluate the patient after radiation for potential TURP or straight catheterizing. Total time spent on this case on the day of encounter is more than 15 minutes.  This time includes combination of one or more of the following - review of necessary tests, review of pertinent medical records from the EMR, performing medically appropriate examination and evaluation, counseling and educating the patient/family/caregiver, ordering necessary medical tests, procedures etc., documenting the clinical information in the electronic medical record, care coordination, referring and communicating with other health care providers and interpretation of results independently.

## 2021-10-14 NOTE — PROGRESS NOTES
Elvie Skhienflakita  10/14/2021  10:26 AM    Pt here for follow up post colonscopy. Patient has catheter and no urinary symptoms. Dr Cy Lemus to eval and teach and sim after appointment. Vitals:    10/14/21 1022   BP: (!) 171/108   Pulse: 101   Resp: 16   SpO2: 99%    :  Patient Currently in Pain: Yes  Pain Assessment: 0-10  Pain Level: 2       Wt Readings from Last 1 Encounters:   10/14/21 142 lb 4 oz (64.5 kg)                Current Outpatient Medications:     fexofenadine (MUCINEX ALLERGY) 180 MG tablet, Take 180 mg by mouth daily, Disp: , Rfl:     oxybutynin (DITROPAN) 5 MG tablet, Take 1 tablet by mouth 3 times daily as needed (bladder spasms), Disp: 90 tablet, Rfl: 3    NONFORMULARY, superbeta prostate BID, Disp: , Rfl:     sennosides-docusate sodium (SENOKOT-S) 8.6-50 MG tablet, Take 1 tablet by mouth daily, Disp: , Rfl:     CRANBERRY PO, Take by mouth daily 3 tabs daily, Disp: , Rfl:          *BREAST Patient only:    Lymphedema Eval:   [] left arm      [] right arm  Location:     Measurement (cm)    Upper Bicep :    Lower Bicep :         FALLS RISK SCREEN  Instructions:  Assess the patient and enter the appropriate indicators that are present for fall risk identification. Total the numbers entered and assign a fall risk score from Table 2.  Reassess patient at a minimum every 12 weeks or with status change. Assessment   Date  10/14/2021     1. Mental Ability: confusion/cognitively impaired 0     2. Elimination Issues: incontinence, frequency 3       3. Ambulatory: use of assistive devices (walker, cane, off-loading devices),        attached to equipment (IV pole, oxygen) 0     4. Sensory Limitations: dizziness, vertigo, impaired vision 0     5. Age less than 65        0     6. Age 72 or greater 1     7. Medication: diuretics, strong analgesics, hypnotics, sedatives,        antihypertensive agents 0   8.   Falls:  recent history of falls within the last 3 months (not to include slipping or tripping) 0   TOTAL 4    If score of 4 or greater was education given? Yes           TABLE 2   Risk Score Risk Level Plan of Care   0-3 Little or  No Risk 1. Provide assistance as indicated for ambulation activities  2. Reorient confused/cognitively impaired patient  3. Chair/bed in low position, stretcher/bed with siderails up except when performing patient care activities  5. Educate patient/family/caregiver on falls prevention  6.  Reassess in 12 weeks or with any noted change in patient condition which places them at a risk for a fall   4-6 Moderate Risk 1. Provide assistance as indicated for ambulation activities  2. Reorient confused/cognitively impaired patient  3. Chair/bed in low position, stretcher/bed with siderails up except when performing patient care activities  4. Educate patient/family/caregiver on falls prevention     7 or   Higher High Risk 1. Place patient in easily observable treatment room  2. Patient attended at all times by family member or staff  3. Provide assistance as indicated for ambulation activities  4. Reorient confused/cognitively impaired patient  5. Chair/bed in low position, stretcher/bed with siderails up except when performing patient care activities  6.   Educate patient/family/caregiver on falls prevention         PLAN: Patient is seen today in follow up        Hannah Figueroa RN

## 2021-10-14 NOTE — PROGRESS NOTES
Pt here today for teach and simulation scan. Radiation and You information provided along with additional education regarding radiation therapy and what to expect. Pt verbalizes understanding and all questions answered to the best of my knowledge. Samples of aquaphor and community resource information provided. Pt escorted to CT room without any difficulty. Clamp placed on catheter to get full bladder and will be removed when sim done.  Also emailed [facility].

## 2021-10-14 NOTE — DISCHARGE INSTR - COC
Continuity of Care Form    Patient Name: Grayson Nguyễn   :  1955  MRN:  [id number]    Admit date:  10/14/2021  Discharge date:  ***    Code Status Order: Prior   Advance Directives:     Admitting Physician:  No admitting provider for patient encounter. PCP: Merna Zepeda MD    Discharging Nurse: [facility] Care Unit/Room#: No information available for this encounter.   Discharging Unit Phone Number: ***    Emergency Contact:   Extended Emergency Contact Information  Primary Emergency Contact: Brisa LING  Home Phone: [phone]  Relation: Child  Secondary Emergency Contact: Addis Mitchell [facility] Phone: [phone]  Mobile Phone: [phone]  Relation: Child    Past Surgical History:  Past Surgical History:   Procedure Laterality Date    BACK SURGERY      COLONOSCOPY N/A 10/8/2021    COLORECTAL CANCER SCREENING, NOT HIGH RISK performed by Hugo Mcfadden MD at [address] [place] [address] 2021    CYSTOSCOPY BLADDER IRRIGATION CLOT EVACUATION performed by Merna Zepeda MD at [facility] N/A 2021    CYSTOSCOPY PROSTATE BIOPSY WITH U/S performed by Merna Zepeda MD at [address]  2021    US PROSTATE NEEDLE PUNCH 2021 [facility] ULTRASOUND       Immunization History:   Immunization History   Administered Date(s) Administered   CRESENCIO Casillas, 100mcg/0.5mL 2021, 09/15/2021       Active Problems:  Patient Active Problem List   Diagnosis Code    Gross hematuria R31.0    Heavy cigarette smoker (20-39 per day) F17.210    Family history of stomach cancer Z80.0    Family history of Alzheimer's disease Z82.0    Anxiety about health F41.8    Elevated PSA R97.20    Bilateral hydronephrosis due to bladder outlet obstruction N13.30    SANDEEP (acute kidney injury) ([address] N17.9    BPH with obstruction/lower urinary tract symptoms N40.1, N13.8    Severe malnutrition (HCC) E43    Bright red blood per rectum K62.5       Isolation/Infection:   Isolation          No Isolation        Patient Infection Status     Infection Onset Added Last Indicated Last Indicated By Review Planned Expiration Resolved Resolved By    None active    Resolved    COVID-19 Rule Out 06/14/21 06/14/21 06/14/21 COVID-19 (Ordered)   06/15/21 Rule-Out Test Resulted          Nurse Assessment:  Last Vital Signs: There were no vitals taken for this visit. Last documented pain score (0-10 scale):    Last Weight:   Wt Readings from Last 1 Encounters:   10/14/21 142 lb 4 oz (64.5 kg)     Mental Status:  {IP PT MENTAL STATUS:20030}    IV Access:  { LUANN IV ACCESS:127172839}    Nursing Mobility/ADLs:  Walking   {CHP DME TIVC:020435704}  Transfer  {CHP DME OQGI:766355541}  Bathing  {CHP DME UYGR:677741059}  Dressing  {CHP DME HGWK:275908679}  Toileting  {CHP DME TXZJ:440646369}  Feeding  {CHP DME YZSJ:665077318}  Med Admin  {P DME DDRC:838716693}  Med Delivery   { LUANN MED Delivery:682230308}    Wound Care Documentation and Therapy:        Elimination:  Continence:   · Bowel: {YES / FD:73561}  · Bladder: {YES / FZ:03773}  Urinary Catheter: {Urinary Catheter:663910011}   Colostomy/Ileostomy/Ileal Conduit: {YES / PE:68474}       Date of Last BM: ***  No intake or output data in the 24 hours ending 10/14/21 1059  No intake/output data recorded.     Safety Concerns:     [address] Safety Concerns:040746943}    Impairments/Disabilities:      [address] [employer] Impairments/Disabilities:698118629}    Nutrition Therapy:  Current Nutrition Therapy:   [address] [employer] Diet List:231633783}    Routes of Feeding: {P DME Other Feedings:941755701}  Liquids: {Slp liquid thickness:46026}  Daily Fluid Restriction: {CHP DME Yes amt example:869237692}  Last Modified Barium Swallow with Video (Video Swallowing Test): {Done Not Done NTSK:670851423}    Treatments at the Time of Hospital Discharge:   Respiratory Treatments: ***  Oxygen Therapy:  {Therapy; copd oxygen:78587}  Ventilator: [address] [facility] Vent RWDW:807304359}    Rehab Therapies: {THERAPEUTIC INTERVENTION:0177683483}  Weight Bearing Status/Restrictions: [facility] CC Weight Bearin}  Other Medical Equipment (for information only, NOT a DME order):  {EQUIPMENT:162440937}  Other Treatments: ***    Patient's personal belongings (please select all that are sent with patient):  {CHP DME Belongings:131157622}    RN SIGNATURE:  {Esignature:850343406}    CASE MANAGEMENT/SOCIAL WORK SECTION    Inpatient Status Date: ***    Readmission Risk Assessment Score:  Readmission Risk              Risk of Unplanned Readmission:  0           Discharging to Facility/ Agency   · Name:   · Address:  · Phone:  · Fax:    Dialysis Facility (if applicable)   · Name:  · Address:  · Dialysis Schedule:  · Phone:  · Fax:    / signature: {Esignature:549243897}    PHYSICIAN SECTION    Prognosis: {Prognosis:1783790794}    Condition at Discharge: [address] Patient Condition:601701307}    Rehab Potential (if transferring to Rehab): {Prognosis:3673727307}    Recommended Labs or Other Treatments After Discharge: ***    Physician Certification: I certify the above information and transfer of Akbar Montgomery  is necessary for the continuing treatment of the diagnosis listed and that he requires {Admit to Appropriate Level of Care:49730} for {GREATER/LESS:427273328} 30 days.      Update Admission H&P: {CHP DME Changes in AZUVC:651453530}    PHYSICIAN SIGNATURE:  {Esignature:814465199}

## 2021-10-14 NOTE — TELEPHONE ENCOUNTER
Social work followed up with patient on his psychosocial. States he got denied for financial assistance through [address] because he needs to send in three months of bank statements. States he is going to continue to work on it. Asked him about any anxiety or worries he has right now and he says he is trying to cope and has good support. Patient reports that he was not sure how to access the [facility]. Advised that a referral was made this morning and that all he needs to do is reach out to them and schedule a time to come to the pantry for groceries. Patient asked SW to send picture of the [email or web address] card to daughter. SW dicussed with patient applying for medicaid. States he makes $1400/month in [place] and is wife makes about the same. Reports that he thinks he makes too much. SW advised that with his previous disability, he may still be eligible for medicaid. SW sent link to patient's daughter to apply for medicaid. SW encourage patient to call with any questions or concerns and advised here to help.

## 2021-10-18 ENCOUNTER — APPOINTMENT (OUTPATIENT)
Dept: RADIATION ONCOLOGY | Facility: MEDICAL CENTER | Age: 66
End: 2021-10-18
Attending: RADIOLOGY
Payer: MEDICARE

## 2021-10-18 ENCOUNTER — TELEPHONE (OUTPATIENT)
Dept: RADIATION ONCOLOGY | Facility: MEDICAL CENTER | Age: 66
End: 2021-10-18

## 2021-10-18 ENCOUNTER — TELEPHONE (OUTPATIENT)
Dept: GASTROENTEROLOGY | Age: 66
End: 2021-10-18

## 2021-10-18 NOTE — TELEPHONE ENCOUNTER
LVM for patient to call the office to schedule follow up.     Colon follow up 2-3 months from 10-8-21

## 2021-10-18 NOTE — TELEPHONE ENCOUNTER
Patient called asking questions about diet and about use of soap. I explained they all start when he starts his treatment.

## 2021-10-22 ENCOUNTER — TELEPHONE (OUTPATIENT)
Dept: ONCOLOGY | Age: 66
End: 2021-10-22

## 2021-10-22 NOTE — TELEPHONE ENCOUNTER
Name: Dee Rasheed  : 1955  MRN: [id number]    Oncology Navigation Follow-Up Note    Contact Type:  Telephone    Notes: Writer spoke to pt daughter Jose Stokes. She states her Dad is doing fairly well. She did ask if I had access to see his recent PSA lab result but pt had it drawn at [facility] by Dr. Deanna Duque. Writer instructed her to call his office for result.      Electronically signed by Dana Verduzco RN on 10/22/2021 at 12:20 PM

## 2021-10-25 ENCOUNTER — HOSPITAL ENCOUNTER (OUTPATIENT)
Dept: RADIATION ONCOLOGY | Facility: MEDICAL CENTER | Age: 66
Discharge: HOME | End: 2021-10-25
Attending: STUDENT IN AN ORGANIZED HEALTH CARE EDUCATION/TRAINING PROGRAM
Payer: MEDICARE

## 2021-10-25 PROCEDURE — 77338 DESIGN MLC DEVICE FOR IMRT: CPT | Performed by: STUDENT IN AN ORGANIZED HEALTH CARE EDUCATION/TRAINING PROGRAM

## 2021-10-25 PROCEDURE — 77301 RADIOTHERAPY DOSE PLAN IMRT: CPT | Performed by: STUDENT IN AN ORGANIZED HEALTH CARE EDUCATION/TRAINING PROGRAM

## 2021-10-25 PROCEDURE — 77300 RADIATION THERAPY DOSE PLAN: CPT | Performed by: STUDENT IN AN ORGANIZED HEALTH CARE EDUCATION/TRAINING PROGRAM

## 2021-11-02 ENCOUNTER — HOSPITAL ENCOUNTER (OUTPATIENT)
Dept: RADIATION ONCOLOGY | Facility: MEDICAL CENTER | Age: 66
Discharge: HOME | End: 2021-11-02
Attending: STUDENT IN AN ORGANIZED HEALTH CARE EDUCATION/TRAINING PROGRAM
Payer: MEDICARE

## 2021-11-02 VITALS
HEART RATE: 96 BPM | WEIGHT: 146.25 LBS | RESPIRATION RATE: 16 BRPM | SYSTOLIC BLOOD PRESSURE: 181 MMHG | OXYGEN SATURATION: 97 % | DIASTOLIC BLOOD PRESSURE: 107 MMHG | BODY MASS INDEX: 20.4 KG/M2 | TEMPERATURE: 96.4 F

## 2021-11-02 DIAGNOSIS — C61 MALIGNANT NEOPLASM OF PROSTATE (HCC): Primary | ICD-10-CM

## 2021-11-02 PROCEDURE — 77385 HC NTSTY MODUL RAD TX DLVR SMPL: CPT | Performed by: STUDENT IN AN ORGANIZED HEALTH CARE EDUCATION/TRAINING PROGRAM

## 2021-11-02 PROCEDURE — 77014 PR CT GUIDANCE PLACEMENT RAD THERAPY FIELDS: CPT | Performed by: STUDENT IN AN ORGANIZED HEALTH CARE EDUCATION/TRAINING PROGRAM

## 2021-11-02 NOTE — PROGRESS NOTES
Radiation Oncology On-Treatment Visit:     Fraction # 2 / 44 (3.6 Gy)    Diagnosis:  Mr. Ck Stone is a 74 y.o. male with NCCN high-risk prostate adenocarcinoma ([id number], [place] 4+3, PSA 54.65). CT abdomen/ pelvis on 2021 and bone scan on 2021 negative for regional or distant metastasis. Patient is s/p monge catheter placement at the direction of Dr. Brandan Nath for persistent urinary obstruction.      Patient is s/p ADT with eligard 7.5 mg on 2021, 2021 and 2021. Treatment course: Definitive radiation to prostate and regional lymphatics    Progress note:  Mr. Ck Stone was seen and evaluated. Patient has not noticed any notable side effects of radiation (second treatment today). Normal bowel movements. Patient maintains monge catheter due to urinary outlet obstruction. Physical exam:   VITAL SIGNS: There were no vitals taken for this visit. ECO Symptomatic but completely ambulatory   General: Elderly gentleman answering questions appropriately. Plan:  · Continue radiation therapy as planned. · I have checked the imaging performed to date, which confirm appropriate positioning.

## 2021-11-02 NOTE — PROGRESS NOTES
Luis A Montgomery  11/2/2021  Wt Readings from Last 3 Encounters:   11/02/21 146 lb 4 oz (66.3 kg)   10/14/21 142 lb 4 oz (64.5 kg)   10/08/21 140 lb (63.5 kg)     Body mass index is 20.4 kg/m². Pt here for OTV. Patient states catheter change yesterday and having it every 45 days with Dr Vibha August. Patient states unable to do gas ex because gave him constipation. Dr Sharmaine Carrero to [facility].    Treatment Area:prostate    Patient was seen today for weekly visit. Comfort Alteration    Fatigue: Moderate    Nutritional Alteration  Anorexia: No  Nausea: No  Vomiting: No     Elimination Alterations  Constipation: no  Diarrhea:  no  Urinary Frequency/Urgency: Yes  Urinary Retention: Yes  Dysuria: Yes, at end of urination (cath change yest)  Urinary Incontinence: Yes  Proctitis: No  Nocturia: Yes has catheter     Emotional  Coping: effective      Skin Alteration   Sensation:none    Radiation Dermatitis:  Intact [x]     Erythema  []     Discoloration  []     Rash []     Dry desquamation  []     Moist desquamation []           Injury, potential bleeding or infection: none    Lab Results   Component Value Date    WBC 10.3 05/15/2021     05/15/2021         BP (S) (!) 181/107 Comment: pt has white coat syndrome according to pt and family. Pulse 96   Temp 96.4 °F (35.8 °C) (Temporal)   Resp 16   Wt 146 lb 4 oz (66.3 kg)   SpO2 97%   BMI 20.40 kg/m²   Patient Currently in Pain: No               Assessment/Plan: Patient was seen today for weekly visit.       Goldie Layne RN

## 2021-11-03 ENCOUNTER — HOSPITAL ENCOUNTER (OUTPATIENT)
Dept: RADIATION ONCOLOGY | Facility: MEDICAL CENTER | Age: 66
Discharge: HOME | End: 2021-11-03
Attending: STUDENT IN AN ORGANIZED HEALTH CARE EDUCATION/TRAINING PROGRAM
Payer: MEDICARE

## 2021-11-03 PROCEDURE — 77014 PR CT GUIDANCE PLACEMENT RAD THERAPY FIELDS: CPT | Performed by: STUDENT IN AN ORGANIZED HEALTH CARE EDUCATION/TRAINING PROGRAM

## 2021-11-03 PROCEDURE — 77385 HC NTSTY MODUL RAD TX DLVR SMPL: CPT | Performed by: STUDENT IN AN ORGANIZED HEALTH CARE EDUCATION/TRAINING PROGRAM

## 2021-11-04 ENCOUNTER — HOSPITAL ENCOUNTER (OUTPATIENT)
Dept: RADIATION ONCOLOGY | Facility: MEDICAL CENTER | Age: 66
Discharge: HOME | End: 2021-11-04
Attending: STUDENT IN AN ORGANIZED HEALTH CARE EDUCATION/TRAINING PROGRAM
Payer: MEDICARE

## 2021-11-04 PROCEDURE — 77014 PR CT GUIDANCE PLACEMENT RAD THERAPY FIELDS: CPT | Performed by: STUDENT IN AN ORGANIZED HEALTH CARE EDUCATION/TRAINING PROGRAM

## 2021-11-04 PROCEDURE — 77385 HC NTSTY MODUL RAD TX DLVR SMPL: CPT | Performed by: STUDENT IN AN ORGANIZED HEALTH CARE EDUCATION/TRAINING PROGRAM

## 2021-11-04 NOTE — PROGRESS NOTES
Nutrition Brief: Wt loss trigger    Wt Readings from Last 5 Encounters:   11/02/21 146 lb 4 oz (66.3 kg)   10/14/21 142 lb 4 oz (64.5 kg)   10/08/21 140 lb (63.5 kg)   09/08/21 141 lb 6 oz (64.1 kg)   08/23/21 142 lb 8 oz (64.6 kg)        Evaluation: Patient seen for BMI 20   He reports before getting sick weighing 178-180#. During this time he sat in his recliner and only had popsicle for \"weeks\". He is now fluctuating from 142-146#. He is now starting to eat more nutritious foods. He states his wife is preparing his meals and he has about 1 meal a day with snacks throughout the day. He had some concerns over using protein powder- worried it would interfere with his radiation. Discussed benefits of protein powder however, to monitor the calories due to usually providing less calories and more protein. Encourage high calorie and high protein diet. Discussed benefits of ONS (Ensure Enlive) he does not like taste    Recommendations:  1. Will continue to monitor weights and change in nutrition alterations  2.  Recommend high calorie high protein diet with ONS 1-2x/d as needed       Darrion Aguillon, [address] [place] [address]  Office Number: [phone]

## 2021-11-05 ENCOUNTER — HOSPITAL ENCOUNTER (OUTPATIENT)
Dept: RADIATION ONCOLOGY | Facility: MEDICAL CENTER | Age: 66
Discharge: HOME | End: 2021-11-05
Attending: STUDENT IN AN ORGANIZED HEALTH CARE EDUCATION/TRAINING PROGRAM
Payer: MEDICARE

## 2021-11-05 PROCEDURE — 77385 HC NTSTY MODUL RAD TX DLVR SMPL: CPT | Performed by: STUDENT IN AN ORGANIZED HEALTH CARE EDUCATION/TRAINING PROGRAM

## 2021-11-05 PROCEDURE — 77014 PR CT GUIDANCE PLACEMENT RAD THERAPY FIELDS: CPT | Performed by: STUDENT IN AN ORGANIZED HEALTH CARE EDUCATION/TRAINING PROGRAM

## 2021-11-05 PROCEDURE — 77427 RADIATION TX MANAGEMENT X5: CPT | Performed by: STUDENT IN AN ORGANIZED HEALTH CARE EDUCATION/TRAINING PROGRAM

## 2021-11-08 ENCOUNTER — HOSPITAL ENCOUNTER (OUTPATIENT)
Dept: RADIATION ONCOLOGY | Facility: MEDICAL CENTER | Age: 66
Discharge: HOME | End: 2021-11-08
Attending: STUDENT IN AN ORGANIZED HEALTH CARE EDUCATION/TRAINING PROGRAM
Payer: MEDICARE

## 2021-11-08 PROCEDURE — 77014 PR CT GUIDANCE PLACEMENT RAD THERAPY FIELDS: CPT | Performed by: STUDENT IN AN ORGANIZED HEALTH CARE EDUCATION/TRAINING PROGRAM

## 2021-11-08 PROCEDURE — 77385 HC NTSTY MODUL RAD TX DLVR SMPL: CPT | Performed by: STUDENT IN AN ORGANIZED HEALTH CARE EDUCATION/TRAINING PROGRAM

## 2021-11-09 ENCOUNTER — HOSPITAL ENCOUNTER (OUTPATIENT)
Dept: RADIATION ONCOLOGY | Facility: MEDICAL CENTER | Age: 66
Discharge: HOME | End: 2021-11-09
Attending: STUDENT IN AN ORGANIZED HEALTH CARE EDUCATION/TRAINING PROGRAM
Payer: MEDICARE

## 2021-11-09 VITALS
TEMPERATURE: 96.2 F | WEIGHT: 145 LBS | BODY MASS INDEX: 20.22 KG/M2 | DIASTOLIC BLOOD PRESSURE: 108 MMHG | HEART RATE: 97 BPM | OXYGEN SATURATION: 96 % | SYSTOLIC BLOOD PRESSURE: 173 MMHG | RESPIRATION RATE: 19 BRPM

## 2021-11-09 DIAGNOSIS — C61 MALIGNANT NEOPLASM OF PROSTATE (HCC): Primary | ICD-10-CM

## 2021-11-09 PROCEDURE — 77336 RADIATION PHYSICS CONSULT: CPT | Performed by: STUDENT IN AN ORGANIZED HEALTH CARE EDUCATION/TRAINING PROGRAM

## 2021-11-09 PROCEDURE — 77014 PR CT GUIDANCE PLACEMENT RAD THERAPY FIELDS: CPT | Performed by: STUDENT IN AN ORGANIZED HEALTH CARE EDUCATION/TRAINING PROGRAM

## 2021-11-09 PROCEDURE — 77385 HC NTSTY MODUL RAD TX DLVR SMPL: CPT | Performed by: STUDENT IN AN ORGANIZED HEALTH CARE EDUCATION/TRAINING PROGRAM

## 2021-11-09 NOTE — PROGRESS NOTES
Radiation Oncology On-Treatment Visit:     Fraction # 10 / 44 (10.8 Gy)    Diagnosis:  Mr. Osvaldo Tavera is a 74 y.o. male with NCCN high-risk prostate adenocarcinoma ([id number], [place] 4+3, PSA 54.65). CT abdomen/ pelvis on 2021 and bone scan on 2021 negative for regional or distant metastasis. Patient is s/p monge catheter placement at the direction of Dr. Hari Gamez for persistent urinary obstruction.      Patient is s/p ADT with eligard 7.5 mg on 2021, 2021 and 2021. 22.5 mg on 10/1/2021    Treatment course: Definitive radiation to prostate and regional lymphatics    Progress note:  Mr. Osvaldo Tavera was seen and evaluated. Patient has not noticed any notable side effects of radiation. . Normal bowel movements. Patient maintains monge catheter due to urinary outlet obstruction. Physical exam:   VITAL SIGNS: BP (S) (!) 173/108 Comment: pt always high at doc visits. asymptomatic. Pulse 97   Temp 96.2 °F (35.7 °C) (Temporal)   Resp 19   Wt 145 lb (65.8 kg)   SpO2 96%   BMI 20.22 kg/m²   ECO Symptomatic but completely ambulatory   General: Elderly gentleman answering questions appropriately. Plan:  · Continue radiation therapy as planned. · I have checked the imaging performed to date, which confirm appropriate positioning.

## 2021-11-09 NOTE — PROGRESS NOTES
Tiara Mir  11/9/2021  Wt Readings from Last 3 Encounters:   11/09/21 145 lb (65.8 kg)   11/02/21 146 lb 4 oz (66.3 kg)   10/14/21 142 lb 4 oz (64.5 kg)     Body mass index is 20.22 kg/m². Pt here for OTV. Patient states BM once daily and no change to his urine routine. Dr Efrem Chan to [facility].    Treatment Area:prostate/pelvis    Patient was seen today for weekly visit. Comfort Alteration    Fatigue: Mild    Nutritional Alteration  Anorexia: Yes  Nausea: No  Vomiting: No     Elimination Alterations  Constipation: no  Diarrhea:  no  Urinary Frequency/Urgency: Yes cath  Urinary Retention: Yes cath  Dysuria: No  Urinary Incontinence: Yes  Proctitis: No  Nocturia: Yes #/night: catheter     Emotional  Coping: effective      Skin Alteration   Sensation:none    Radiation Dermatitis:  Intact [x]     Erythema  []     Discoloration  []     Rash []     Dry desquamation  []     Moist desquamation []           Injury, potential bleeding or infection: none    Lab Results   Component Value Date    WBC 10.3 05/15/2021     05/15/2021         BP (S) (!) 173/108 Comment: pt always high at doc visits. asymptomatic. Pulse 97   Temp 96.2 °F (35.7 °C) (Temporal)   Resp 19   Wt 145 lb (65.8 kg)   SpO2 96%   BMI 20.22 kg/m²   Patient Currently in Pain: No               Assessment/Plan: Patient was seen today for weekly visit.       Kristofer Monroy RN

## 2021-11-10 ENCOUNTER — HOSPITAL ENCOUNTER (OUTPATIENT)
Dept: RADIATION ONCOLOGY | Facility: MEDICAL CENTER | Age: 66
Discharge: HOME | End: 2021-11-10
Attending: STUDENT IN AN ORGANIZED HEALTH CARE EDUCATION/TRAINING PROGRAM
Payer: MEDICARE

## 2021-11-10 PROCEDURE — 77385 HC NTSTY MODUL RAD TX DLVR SMPL: CPT | Performed by: STUDENT IN AN ORGANIZED HEALTH CARE EDUCATION/TRAINING PROGRAM

## 2021-11-10 PROCEDURE — 77014 PR CT GUIDANCE PLACEMENT RAD THERAPY FIELDS: CPT | Performed by: STUDENT IN AN ORGANIZED HEALTH CARE EDUCATION/TRAINING PROGRAM

## 2021-11-11 ENCOUNTER — HOSPITAL ENCOUNTER (OUTPATIENT)
Dept: RADIATION ONCOLOGY | Facility: MEDICAL CENTER | Age: 66
Discharge: HOME | End: 2021-11-11
Attending: STUDENT IN AN ORGANIZED HEALTH CARE EDUCATION/TRAINING PROGRAM
Payer: MEDICARE

## 2021-11-11 PROCEDURE — 77385 HC NTSTY MODUL RAD TX DLVR SMPL: CPT | Performed by: STUDENT IN AN ORGANIZED HEALTH CARE EDUCATION/TRAINING PROGRAM

## 2021-11-11 PROCEDURE — 77014 PR CT GUIDANCE PLACEMENT RAD THERAPY FIELDS: CPT | Performed by: STUDENT IN AN ORGANIZED HEALTH CARE EDUCATION/TRAINING PROGRAM

## 2021-11-11 NOTE — PROGRESS NOTES
Nutrition Brief: Wt loss trigger    Wt Readings from Last 5 Encounters:   11/09/21 145 lb (65.8 kg)   11/02/21 146 lb 4 oz (66.3 kg)   10/14/21 142 lb 4 oz (64.5 kg)   10/08/21 140 lb (63.5 kg)   09/08/21 141 lb 6 oz (64.1 kg)        Evaluation: +wt loss of 0.5 kg (0.8%) x past 1 week. Non significant at this time. This may be r/t treatment and reports of mild fatigue and anorexia. Patient is having 1 BM per day. Encourage high calorie and high protein diet. Discussed benefits of ONS (Ensure Enlive) he does not like taste- and states Ensure Enlive cause his diarrhea to be worst.      Recommendations:  1. Will continue to monitor weights and change in nutrition alterations  2.  Recommend high calorie high protein diet with ONS 1-2x/d as needed         Jus Seymour, [address]  Office Number: [phone]

## 2021-11-12 ENCOUNTER — HOSPITAL ENCOUNTER (OUTPATIENT)
Dept: RADIATION ONCOLOGY | Facility: MEDICAL CENTER | Age: 66
Discharge: HOME | End: 2021-11-12
Attending: STUDENT IN AN ORGANIZED HEALTH CARE EDUCATION/TRAINING PROGRAM
Payer: MEDICARE

## 2021-11-12 PROCEDURE — 77014 PR CT GUIDANCE PLACEMENT RAD THERAPY FIELDS: CPT | Performed by: STUDENT IN AN ORGANIZED HEALTH CARE EDUCATION/TRAINING PROGRAM

## 2021-11-12 PROCEDURE — 77385 HC NTSTY MODUL RAD TX DLVR SMPL: CPT | Performed by: STUDENT IN AN ORGANIZED HEALTH CARE EDUCATION/TRAINING PROGRAM

## 2021-11-15 ENCOUNTER — HOSPITAL ENCOUNTER (OUTPATIENT)
Dept: RADIATION ONCOLOGY | Facility: MEDICAL CENTER | Age: 66
Discharge: HOME | End: 2021-11-15
Attending: STUDENT IN AN ORGANIZED HEALTH CARE EDUCATION/TRAINING PROGRAM
Payer: MEDICARE

## 2021-11-15 PROCEDURE — 77338 DESIGN MLC DEVICE FOR IMRT: CPT | Performed by: STUDENT IN AN ORGANIZED HEALTH CARE EDUCATION/TRAINING PROGRAM

## 2021-11-15 PROCEDURE — 77301 RADIOTHERAPY DOSE PLAN IMRT: CPT | Performed by: STUDENT IN AN ORGANIZED HEALTH CARE EDUCATION/TRAINING PROGRAM

## 2021-11-15 PROCEDURE — 77300 RADIATION THERAPY DOSE PLAN: CPT | Performed by: STUDENT IN AN ORGANIZED HEALTH CARE EDUCATION/TRAINING PROGRAM

## 2021-11-15 PROCEDURE — 77427 RADIATION TX MANAGEMENT X5: CPT | Performed by: STUDENT IN AN ORGANIZED HEALTH CARE EDUCATION/TRAINING PROGRAM

## 2021-11-15 PROCEDURE — 77385 HC NTSTY MODUL RAD TX DLVR SMPL: CPT | Performed by: STUDENT IN AN ORGANIZED HEALTH CARE EDUCATION/TRAINING PROGRAM

## 2021-11-16 ENCOUNTER — HOSPITAL ENCOUNTER (OUTPATIENT)
Dept: RADIATION ONCOLOGY | Facility: MEDICAL CENTER | Age: 66
Discharge: HOME | End: 2021-11-16
Attending: STUDENT IN AN ORGANIZED HEALTH CARE EDUCATION/TRAINING PROGRAM
Payer: MEDICARE

## 2021-11-16 VITALS
SYSTOLIC BLOOD PRESSURE: 176 MMHG | WEIGHT: 147.5 LBS | TEMPERATURE: 96.4 F | RESPIRATION RATE: 16 BRPM | OXYGEN SATURATION: 98 % | HEART RATE: 95 BPM | DIASTOLIC BLOOD PRESSURE: 109 MMHG | BODY MASS INDEX: 20.57 KG/M2

## 2021-11-16 DIAGNOSIS — C61 MALIGNANT NEOPLASM OF PROSTATE (HCC): Primary | ICD-10-CM

## 2021-11-16 PROCEDURE — 77385 HC NTSTY MODUL RAD TX DLVR SMPL: CPT | Performed by: STUDENT IN AN ORGANIZED HEALTH CARE EDUCATION/TRAINING PROGRAM

## 2021-11-16 PROCEDURE — 77336 RADIATION PHYSICS CONSULT: CPT | Performed by: STUDENT IN AN ORGANIZED HEALTH CARE EDUCATION/TRAINING PROGRAM

## 2021-11-16 PROCEDURE — 77014 PR CT GUIDANCE PLACEMENT RAD THERAPY FIELDS: CPT | Performed by: STUDENT IN AN ORGANIZED HEALTH CARE EDUCATION/TRAINING PROGRAM

## 2021-11-16 ASSESSMENT — PAIN DESCRIPTION - PAIN TYPE: TYPE: ACUTE PAIN

## 2021-11-16 ASSESSMENT — PAIN SCALES - GENERAL: PAINLEVEL_OUTOF10: 6

## 2021-11-16 NOTE — PROGRESS NOTES
Dee Rasheed  11/16/2021  Wt Readings from Last 3 Encounters:   11/16/21 147 lb 8 oz (66.9 kg)   11/09/21 145 lb (65.8 kg)   11/02/21 146 lb 4 oz (66.3 kg)     Body mass index is 20.57 kg/m². Pt here for OTV. Patient states no issues. Still has catheter in place. Dr Titi Wright to [facility].    Treatment Area:prostate    Patient was seen today for weekly visit. Comfort Alteration    Fatigue: Moderate    Nutritional Alteration  Anorexia: No  Nausea: No  Vomiting: No     Elimination Alterations  Constipation: no  Diarrhea:  no  Urinary Frequency/Urgency: Yes  Urinary Retention: Yes  Dysuria: No  Urinary Incontinence: Yes  Proctitis: No  Nocturia: Yes #/night: catheter     Emotional  Coping: somewhat effective      Skin Alteration   Sensation:none    Radiation Dermatitis:  Intact [x]     Erythema  []     Discoloration  []     Rash []     Dry desquamation  []     Moist desquamation []           Injury, potential bleeding or infection: none    Lab Results   Component Value Date    WBC 10.3 05/15/2021     05/15/2021         BP (S) (!) 176/109 Comment: asymptomatic. Pulse 95   Temp 96.4 °F (35.8 °C) (Temporal)   Resp 16   Wt 147 lb 8 oz (66.9 kg)   SpO2 98%   BMI 20.57 kg/m²   Patient Currently in Pain: Yes  Pain Assessment: 0-10 (last for a second. oxybutynin helping)  Pain Level: 6         Assessment/Plan: Patient was seen today for weekly visit.       Kathryn Sanchez RN

## 2021-11-16 NOTE — PROGRESS NOTES
Radiation Oncology On-Treatment Visit:     Fraction #  (19.8 Gy)    Diagnosis:  Mr. Mony Lopez is a 74 y.o. male with NCCN high-risk prostate adenocarcinoma ([id number], [place] 4+3, PSA 54.65). CT abdomen/ pelvis on 2021 and bone scan on 2021 negative for regional or distant metastasis. Patient is s/p monge catheter placement at the direction of Dr. Chaz Kim for persistent urinary obstruction.      Patient is s/p ADT with eligard 7.5 mg on 2021, 2021 and 2021. 22.5 mg on 10/1/2021    Treatment course: Definitive radiation to prostate and regional lymphatics    Progress note:  Mr. Mony Lopez was seen and evaluated. Patient notes bladder spasms and was started on oxybutynin by urology. Otherwise doing well. Physical exam:   VITAL SIGNS: BP (S) (!) 176/109 Comment: asymptomatic. Pulse 95   Temp 96.4 °F (35.8 °C) (Temporal)   Resp 16   Wt 147 lb 8 oz (66.9 kg)   SpO2 98%   BMI 20.57 kg/m²   ECO Symptomatic but completely ambulatory   General: Elderly gentleman answering questions appropriately. Plan:  · Continue radiation therapy as planned. · Bladder spasm - continue oxybutynin 5 mg TID PRN. · I have checked the imaging performed to date, which confirm appropriate positioning.

## 2021-11-17 ENCOUNTER — HOSPITAL ENCOUNTER (OUTPATIENT)
Dept: RADIATION ONCOLOGY | Facility: MEDICAL CENTER | Age: 66
Discharge: HOME | End: 2021-11-17
Attending: STUDENT IN AN ORGANIZED HEALTH CARE EDUCATION/TRAINING PROGRAM
Payer: MEDICARE

## 2021-11-17 PROCEDURE — 77014 PR CT GUIDANCE PLACEMENT RAD THERAPY FIELDS: CPT | Performed by: STUDENT IN AN ORGANIZED HEALTH CARE EDUCATION/TRAINING PROGRAM

## 2021-11-17 PROCEDURE — 77385 HC NTSTY MODUL RAD TX DLVR SMPL: CPT | Performed by: STUDENT IN AN ORGANIZED HEALTH CARE EDUCATION/TRAINING PROGRAM

## 2021-11-18 ENCOUNTER — HOSPITAL ENCOUNTER (OUTPATIENT)
Dept: RADIATION ONCOLOGY | Facility: MEDICAL CENTER | Age: 66
Discharge: HOME | End: 2021-11-18
Attending: STUDENT IN AN ORGANIZED HEALTH CARE EDUCATION/TRAINING PROGRAM
Payer: MEDICARE

## 2021-11-18 PROCEDURE — 77385 HC NTSTY MODUL RAD TX DLVR SMPL: CPT | Performed by: STUDENT IN AN ORGANIZED HEALTH CARE EDUCATION/TRAINING PROGRAM

## 2021-11-18 PROCEDURE — 77014 PR CT GUIDANCE PLACEMENT RAD THERAPY FIELDS: CPT | Performed by: STUDENT IN AN ORGANIZED HEALTH CARE EDUCATION/TRAINING PROGRAM

## 2021-11-19 ENCOUNTER — HOSPITAL ENCOUNTER (OUTPATIENT)
Dept: RADIATION ONCOLOGY | Facility: MEDICAL CENTER | Age: 66
Discharge: HOME | End: 2021-11-19
Attending: STUDENT IN AN ORGANIZED HEALTH CARE EDUCATION/TRAINING PROGRAM
Payer: MEDICARE

## 2021-11-19 PROCEDURE — 77014 PR CT GUIDANCE PLACEMENT RAD THERAPY FIELDS: CPT | Performed by: STUDENT IN AN ORGANIZED HEALTH CARE EDUCATION/TRAINING PROGRAM

## 2021-11-19 PROCEDURE — 77385 HC NTSTY MODUL RAD TX DLVR SMPL: CPT | Performed by: STUDENT IN AN ORGANIZED HEALTH CARE EDUCATION/TRAINING PROGRAM

## 2021-11-21 ENCOUNTER — HOSPITAL ENCOUNTER (OUTPATIENT)
Dept: RADIATION ONCOLOGY | Facility: MEDICAL CENTER | Age: 66
Discharge: HOME | End: 2021-11-21
Attending: STUDENT IN AN ORGANIZED HEALTH CARE EDUCATION/TRAINING PROGRAM
Payer: MEDICARE

## 2021-11-21 PROCEDURE — 77427 RADIATION TX MANAGEMENT X5: CPT | Performed by: STUDENT IN AN ORGANIZED HEALTH CARE EDUCATION/TRAINING PROGRAM

## 2021-11-21 PROCEDURE — 77014 PR CT GUIDANCE PLACEMENT RAD THERAPY FIELDS: CPT | Performed by: STUDENT IN AN ORGANIZED HEALTH CARE EDUCATION/TRAINING PROGRAM

## 2021-11-21 PROCEDURE — 77385 HC NTSTY MODUL RAD TX DLVR SMPL: CPT | Performed by: STUDENT IN AN ORGANIZED HEALTH CARE EDUCATION/TRAINING PROGRAM

## 2021-11-22 ENCOUNTER — HOSPITAL ENCOUNTER (OUTPATIENT)
Dept: RADIATION ONCOLOGY | Facility: MEDICAL CENTER | Age: 66
Discharge: HOME | End: 2021-11-22
Attending: STUDENT IN AN ORGANIZED HEALTH CARE EDUCATION/TRAINING PROGRAM
Payer: MEDICARE

## 2021-11-22 PROCEDURE — 77385 HC NTSTY MODUL RAD TX DLVR SMPL: CPT | Performed by: STUDENT IN AN ORGANIZED HEALTH CARE EDUCATION/TRAINING PROGRAM

## 2021-11-22 PROCEDURE — 77014 PR CT GUIDANCE PLACEMENT RAD THERAPY FIELDS: CPT | Performed by: STUDENT IN AN ORGANIZED HEALTH CARE EDUCATION/TRAINING PROGRAM

## 2021-11-22 PROCEDURE — 77336 RADIATION PHYSICS CONSULT: CPT | Performed by: STUDENT IN AN ORGANIZED HEALTH CARE EDUCATION/TRAINING PROGRAM

## 2021-11-23 ENCOUNTER — HOSPITAL ENCOUNTER (OUTPATIENT)
Dept: RADIATION ONCOLOGY | Facility: MEDICAL CENTER | Age: 66
Discharge: HOME | End: 2021-11-23
Attending: STUDENT IN AN ORGANIZED HEALTH CARE EDUCATION/TRAINING PROGRAM
Payer: MEDICARE

## 2021-11-23 VITALS
BODY MASS INDEX: 19.94 KG/M2 | TEMPERATURE: 96.3 F | RESPIRATION RATE: 16 BRPM | WEIGHT: 143 LBS | SYSTOLIC BLOOD PRESSURE: 184 MMHG | DIASTOLIC BLOOD PRESSURE: 113 MMHG | OXYGEN SATURATION: 97 % | HEART RATE: 110 BPM

## 2021-11-23 DIAGNOSIS — C61 MALIGNANT NEOPLASM OF PROSTATE (HCC): Primary | ICD-10-CM

## 2021-11-23 PROCEDURE — 77385 HC NTSTY MODUL RAD TX DLVR SMPL: CPT | Performed by: STUDENT IN AN ORGANIZED HEALTH CARE EDUCATION/TRAINING PROGRAM

## 2021-11-23 PROCEDURE — 77014 PR CT GUIDANCE PLACEMENT RAD THERAPY FIELDS: CPT | Performed by: STUDENT IN AN ORGANIZED HEALTH CARE EDUCATION/TRAINING PROGRAM

## 2021-11-23 ASSESSMENT — PAIN DESCRIPTION - DESCRIPTORS: DESCRIPTORS: BURNING

## 2021-11-23 ASSESSMENT — PAIN DESCRIPTION - PAIN TYPE: TYPE: ACUTE PAIN

## 2021-11-23 ASSESSMENT — PAIN SCALES - GENERAL: PAINLEVEL_OUTOF10: 5

## 2021-11-23 NOTE — PROGRESS NOTES
Dennis Moore  11/23/2021  Wt Readings from Last 3 Encounters:   11/23/21 143 lb (64.9 kg)   11/16/21 147 lb 8 oz (66.9 kg)   11/09/21 145 lb (65.8 kg)     Body mass index is 19.94 kg/m². Pt here for OTV. Patient states he had been having diarrhea and then he stopped the immodium then this am he had constipation, but did end up having BM. Patient states he is to see his urologist today because he is still having pain when he pees. Catheter still in place and dark yellow urine present. Dr Lilo Granda to [facility].    Treatment Area:prostate    Patient was seen today for weekly visit. Comfort Alteration    Fatigue: None    Nutritional Alteration  Anorexia: No  Nausea: No  Vomiting: No     Elimination Alterations  Constipation: yes  Diarrhea:  no  Urinary Frequency/Urgency: Yes  Urinary Retention: No  Dysuria: Yes  Urinary Incontinence: Yes  Proctitis: No  Nocturia: Yes #/night: catheter     Emotional  Coping: effective      Skin Alteration   Sensation:none    Radiation Dermatitis:  Intact [x]     Erythema  []     Discoloration  []     Rash []     Dry desquamation  []     Moist desquamation []           Injury, potential bleeding or infection: none    Lab Results   Component Value Date    WBC 10.3 05/15/2021     05/15/2021         BP (!) 184/113   Pulse 110   Temp 96.3 °F (35.7 °C) (Temporal)   Resp 16   Wt 143 lb (64.9 kg)   SpO2 97%   BMI 19.94 kg/m²   Patient Currently in Pain: Yes  Pain Assessment: 0-10  Pain Level: 5         Assessment/Plan: Patient was seen today for weekly visit.       Sejal Chavez RN

## 2021-11-24 ENCOUNTER — HOSPITAL ENCOUNTER (OUTPATIENT)
Dept: RADIATION ONCOLOGY | Facility: MEDICAL CENTER | Age: 66
Discharge: HOME | End: 2021-11-24
Attending: STUDENT IN AN ORGANIZED HEALTH CARE EDUCATION/TRAINING PROGRAM
Payer: MEDICARE

## 2021-11-24 PROCEDURE — 77385 HC NTSTY MODUL RAD TX DLVR SMPL: CPT | Performed by: STUDENT IN AN ORGANIZED HEALTH CARE EDUCATION/TRAINING PROGRAM

## 2021-11-24 PROCEDURE — 77014 PR CT GUIDANCE PLACEMENT RAD THERAPY FIELDS: CPT | Performed by: STUDENT IN AN ORGANIZED HEALTH CARE EDUCATION/TRAINING PROGRAM

## 2021-11-27 ENCOUNTER — HOSPITAL ENCOUNTER (EMERGENCY)
Age: 66
Discharge: HOME | End: 2021-11-27
Attending: EMERGENCY MEDICINE
Payer: MEDICARE

## 2021-11-27 VITALS
BODY MASS INDEX: 19.23 KG/M2 | HEART RATE: 112 BPM | WEIGHT: 142 LBS | RESPIRATION RATE: 16 BRPM | SYSTOLIC BLOOD PRESSURE: 179 MMHG | DIASTOLIC BLOOD PRESSURE: 112 MMHG | HEIGHT: 72 IN | TEMPERATURE: 97.9 F | OXYGEN SATURATION: 98 %

## 2021-11-27 DIAGNOSIS — N30.01 ACUTE CYSTITIS WITH HEMATURIA: ICD-10-CM

## 2021-11-27 DIAGNOSIS — Z46.6 URINARY CATHETER (FOLEY) CHANGE REQUIRED: Primary | ICD-10-CM

## 2021-11-27 LAB
-: ABNORMAL
AMORPHOUS: ABNORMAL
BACTERIA: ABNORMAL
BILIRUBIN URINE: NEGATIVE
CASTS UA: ABNORMAL /LPF
COLOR: YELLOW
COMMENT UA: ABNORMAL
CRYSTALS, UA: ABNORMAL /HPF
EPITHELIAL CELLS UA: ABNORMAL /HPF (ref 0–5)
GLUCOSE URINE: NEGATIVE
KETONES, URINE: NEGATIVE
LEUKOCYTE ESTERASE, URINE: ABNORMAL
MUCUS: ABNORMAL
NITRITE, URINE: NEGATIVE
OTHER OBSERVATIONS UA: ABNORMAL
PH UA: 6 (ref 5–8)
PROTEIN UA: ABNORMAL
RBC UA: ABNORMAL /HPF (ref 0–2)
RENAL EPITHELIAL, UA: ABNORMAL /HPF
SPECIFIC GRAVITY UA: 1.01 (ref 1–1.03)
TRICHOMONAS: ABNORMAL
TURBIDITY: ABNORMAL
URINE HGB: ABNORMAL
UROBILINOGEN, URINE: NORMAL
WBC UA: ABNORMAL /HPF (ref 0–5)
YEAST: ABNORMAL

## 2021-11-27 PROCEDURE — 87086 URINE CULTURE/COLONY COUNT: CPT

## 2021-11-27 PROCEDURE — 87077 CULTURE AEROBIC IDENTIFY: CPT

## 2021-11-27 PROCEDURE — 51702 INSERT TEMP BLADDER CATH: CPT

## 2021-11-27 PROCEDURE — 87186 SC STD MICRODIL/AGAR DIL: CPT

## 2021-11-27 PROCEDURE — 81001 URINALYSIS AUTO W/SCOPE: CPT

## 2021-11-27 PROCEDURE — 99281 EMR DPT VST MAYX REQ PHY/QHP: CPT

## 2021-11-27 RX ORDER — LIDOCAINE HYDROCHLORIDE 20 MG/ML
5 JELLY TOPICAL PRN
Status: DISCONTINUED | OUTPATIENT
Start: 2021-11-27 | End: 2021-11-27 | Stop reason: HOSPADM

## 2021-11-27 RX ORDER — CEPHALEXIN 500 MG/1
500 CAPSULE ORAL ONCE
Status: DISCONTINUED | OUTPATIENT
Start: 2021-11-27 | End: 2021-11-27 | Stop reason: HOSPADM

## 2021-11-27 RX ORDER — CEPHALEXIN 500 MG/1
500 CAPSULE ORAL 2 TIMES DAILY
Qty: 20 CAPSULE | Refills: 0 | Status: SHIPPED | OUTPATIENT
Start: 2021-11-27 | End: 2021-12-07

## 2021-11-27 ASSESSMENT — ENCOUNTER SYMPTOMS
COLOR CHANGE: 0
EYE REDNESS: 0
RHINORRHEA: 0
NAUSEA: 0
COUGH: 0
SHORTNESS OF BREATH: 0
SORE THROAT: 0
VOMITING: 0
DIARRHEA: 0
EYE DISCHARGE: 0

## 2021-11-27 ASSESSMENT — PAIN SCALES - GENERAL: PAINLEVEL_OUTOF10: 10

## 2021-11-27 NOTE — ED PROVIDER NOTES
EMERGENCY DEPARTMENT ENCOUNTER    Pt Name: Jose Manuel Crespo  MRN: [id number]  [place] 1955  Date of evaluation: 11/27/21  CHIEF COMPLAINT       Chief Complaint   Patient presents with    Urinary Catheter Problem     cath clogged     HISTORY OF PRESENT ILLNESS   55-year-old presents with complaints of a clogged Paige catheter. Patient has a history of prostate cancer, he has a Paige catheter that is indwelling, he states that over the past few hours he has noticed that the catheter bag was not feeling, he was having bladder spasms and spasming around the catheter. He denies any fevers or chills, no chest pain or shortness of breath, he called and spoke with his urologist who recommended he come to the ER to have the catheter changed. REVIEW OF SYSTEMS     Review of Systems   Constitutional: Negative for chills and fever. HENT: Negative for rhinorrhea and sore throat. Eyes: Negative for discharge, redness and visual disturbance. Respiratory: Negative for cough and shortness of breath. Cardiovascular: Negative for chest pain, palpitations and leg swelling. Gastrointestinal: Negative for diarrhea, nausea and vomiting. Genitourinary:        Bladder spasms, clogged catheter   Musculoskeletal: Negative for arthralgias, myalgias and neck pain. Skin: Negative for color change and rash. Neurological: Negative for seizures, weakness and headaches. Psychiatric/Behavioral: Negative for hallucinations, self-injury and suicidal ideas.      PASTMEDICAL HISTORY     Past Medical History:   Diagnosis Date    Bilateral hydronephrosis     BPH (benign prostatic hyperplasia)     Cancer (HCC)     prostate    GERD (gastroesophageal reflux disease)     Hemorrhoid     Hypertension     PSA elevation     UTI (urinary tract infection)     Weight loss      Past Problem List  Patient Active Problem List   Diagnosis Code    Gross hematuria R31.0    Heavy cigarette smoker (20-39 per day) F17.210    Family history of stomach cancer Z80.0    Family history of Alzheimer's disease Z82.0    Anxiety about health F41.8    Elevated PSA R97.20    Bilateral hydronephrosis due to bladder outlet obstruction N13.30    SANDEEP (acute kidney injury) ([facility] [address] N17.9    BPH with obstruction/lower urinary tract symptoms N40.1, N13.8    Severe malnutrition (HCC) E43    Bright red blood per rectum K62.5    Malignant neoplasm of prostate ([facility] [address] C61     SURGICAL HISTORY       Past Surgical History:   Procedure Laterality Date    BACK SURGERY      COLONOSCOPY N/A 10/8/2021    COLORECTAL CANCER SCREENING, NOT HIGH RISK performed by Shereen Smith MD at [facility] 2021    CYSTOSCOPY BLADDER IRRIGATION CLOT EVACUATION performed by Maru Villafuerte MD at [address] [place] [address] N/A 2021    CYSTOSCOPY PROSTATE BIOPSY WITH U/S performed by Maru Villafuerte MD at [address]  2021    US PROSTATE NEEDLE PUNCH 2021 [facility] ULTRASOUND     CURRENT MEDICATIONS       Current Discharge Medication List      CONTINUE these medications which have NOT CHANGED    Details   fexofenadine (MUCINEX ALLERGY) 180 MG tablet Take 180 mg by mouth daily      oxybutynin (DITROPAN) 5 MG tablet Take 1 tablet by mouth 3 times daily as needed (bladder spasms)  Qty: 90 tablet, Refills: 3      NONFORMULARY superbeta prostate BID      sennosides-docusate sodium (SENOKOT-S) 8.6-50 MG tablet Take 1 tablet by mouth daily      CRANBERRY PO Take by mouth daily 3 tabs daily           ALLERGIES     is allergic to latex and macrobid [nitrofurantoin]. FAMILY HISTORY     He indicated that his mother is . He indicated that his sister is .      SOCIAL HISTORY       Social History     Tobacco Use    Smoking status: Current Every Day Smoker     Packs/day: 2.00     Types: Cigarettes    Smokeless tobacco: Never Used   Vaping Use    Vaping Use: Never used   Substance Use Topics    Alcohol use: Not Currently    Drug use: Never     PHYSICAL EXAM     INITIAL VITALS: BP (!) 179/112   Pulse 112   Temp 97.9 °F (36.6 °C)   Resp 16   Ht 6' (1.829 m)   Wt 142 lb (64.4 kg)   SpO2 98%   BMI 19.26 kg/m²    Physical Exam  Constitutional:       Appearance: Normal appearance. HENT:      Head: Normocephalic and atraumatic. Eyes:      Extraocular Movements: Extraocular movements intact. Pupils: Pupils are equal, round, and reactive to light. Cardiovascular:      Rate and Rhythm: Normal rate and regular rhythm. Pulmonary:      Effort: Pulmonary effort is normal.      Breath sounds: Normal breath sounds. Abdominal:      General: Abdomen is flat. Palpations: Abdomen is soft. Tenderness: There is no abdominal tenderness. Neurological:      Mental Status: He is alert. MEDICAL DECISION MAKIN-year-old male presents with complaints of bladder spasms from a clogged catheter, plan is catheter removal with catheter reinsertion. CRITICAL CARE:       PROCEDURES:    Procedures    DIAGNOSTIC RESULTS   EKG:All EKG's are interpreted by the Emergency Department Physician who either signs or Co-signs this chart in the absence of a cardiologist.        RADIOLOGY:All plain film, CT, MRI, and formal ultrasound images (except ED bedside ultrasound) are read by the radiologist, see reports below, unless otherwisenoted in MDM or here. No orders to display     LABS: All lab results were reviewed by myself, and all abnormals are listed below.   Labs Reviewed   CULTURE, URINE   URINALYSIS       EMERGENCY DEPARTMENTCOURSE:         Vitals:    Vitals:    21 1518   BP: (!) 179/112   Pulse: 112   Resp: 16   Temp: 97.9 °F (36.6 °C)   SpO2: 98%   Weight: 142 lb (64.4 kg)   Height: 6' (1.829 m)       The patient was given the following medications while in the emergency department:  Orders Placed This Encounter   Medications    lidocaine (XYLOCAINE) 2 % uro-jet 5 mL CONSULTS:  None    FINAL IMPRESSION      1. Urinary catheter (Paige) change required          DISPOSITION/PLAN   DISPOSITION        PATIENT REFERRED TO:  Laurent Sharma MD  [address], [address] [facility]    Schedule an appointment as soon as possible for a visit in 2 days      DISCHARGE MEDICATIONS:  Current Discharge Medication List        The care is provided during an unprecedented national emergency due to the novel coronavirus, COVID 19.   Douglas Spain MD  Attending Emergency Physician                   Douglas Spain MD  11/27/21 [id number]

## 2021-11-29 ENCOUNTER — TELEPHONE (OUTPATIENT)
Dept: RADIATION ONCOLOGY | Facility: MEDICAL CENTER | Age: 66
End: 2021-11-29

## 2021-11-29 ENCOUNTER — HOSPITAL ENCOUNTER (OUTPATIENT)
Dept: RADIATION ONCOLOGY | Facility: MEDICAL CENTER | Age: 66
End: 2021-11-29
Attending: STUDENT IN AN ORGANIZED HEALTH CARE EDUCATION/TRAINING PROGRAM
Payer: MEDICARE

## 2021-11-29 DIAGNOSIS — C61 MALIGNANT NEOPLASM OF PROSTATE (HCC): ICD-10-CM

## 2021-11-29 DIAGNOSIS — C61 MALIGNANT NEOPLASM OF PROSTATE (HCC): Primary | ICD-10-CM

## 2021-11-29 LAB
CULTURE: ABNORMAL
Lab: ABNORMAL
SPECIMEN DESCRIPTION: ABNORMAL

## 2021-11-29 NOTE — TELEPHONE ENCOUNTER
Radiation Therapy held today due to urinary complaints- urine orange/brown. Provided with order for repeat UA/C&S. Currently on Keflex for recently diagnosed UTI. Dr. Titi Wright wants patient to see Dr. Deanna Duque prior to resuming Radiation. Dr. Deanna Duque wants patient to change antibiotic to Cipro and repeat UA on Monday 12/6/21. Dr. Titi Wright wants treatment held until Tuesday 12/7/21. Daughter and Radiation techs notified of treatment plan.

## 2021-11-30 ENCOUNTER — APPOINTMENT (OUTPATIENT)
Dept: RADIATION ONCOLOGY | Facility: MEDICAL CENTER | Age: 66
End: 2021-11-30
Attending: STUDENT IN AN ORGANIZED HEALTH CARE EDUCATION/TRAINING PROGRAM
Payer: MEDICARE

## 2021-12-01 ENCOUNTER — APPOINTMENT (OUTPATIENT)
Dept: RADIATION ONCOLOGY | Facility: MEDICAL CENTER | Age: 66
End: 2021-12-01
Attending: STUDENT IN AN ORGANIZED HEALTH CARE EDUCATION/TRAINING PROGRAM
Payer: MEDICARE

## 2021-12-02 ENCOUNTER — APPOINTMENT (OUTPATIENT)
Dept: RADIATION ONCOLOGY | Facility: MEDICAL CENTER | Age: 66
End: 2021-12-02
Attending: STUDENT IN AN ORGANIZED HEALTH CARE EDUCATION/TRAINING PROGRAM
Payer: MEDICARE

## 2021-12-03 ENCOUNTER — TELEPHONE (OUTPATIENT)
Dept: ONCOLOGY | Age: 66
End: 2021-12-03

## 2021-12-03 ENCOUNTER — APPOINTMENT (OUTPATIENT)
Dept: RADIATION ONCOLOGY | Facility: MEDICAL CENTER | Age: 66
End: 2021-12-03
Attending: STUDENT IN AN ORGANIZED HEALTH CARE EDUCATION/TRAINING PROGRAM
Payer: MEDICARE

## 2021-12-03 NOTE — TELEPHONE ENCOUNTER
Name: David De Leon  : 1955  MRN: [id number]    Oncology Navigation Follow-Up Note    Contact Type:  Telephone    Notes: Writer called patient to check on him and to assess any needs he may have. He states hes doing OK. Pt was just dx with a UTI and he states his symptoms are starting to improve. Pt was appreciative of call. Will continue to follow.     Electronically signed by Simona Perez RN on 12/3/2021 at 3:10 PM

## 2021-12-06 ENCOUNTER — APPOINTMENT (OUTPATIENT)
Dept: RADIATION ONCOLOGY | Facility: MEDICAL CENTER | Age: 66
End: 2021-12-06
Attending: STUDENT IN AN ORGANIZED HEALTH CARE EDUCATION/TRAINING PROGRAM
Payer: MEDICARE

## 2021-12-06 ENCOUNTER — TELEPHONE (OUTPATIENT)
Dept: RADIATION ONCOLOGY | Facility: MEDICAL CENTER | Age: 66
End: 2021-12-06

## 2021-12-06 NOTE — TELEPHONE ENCOUNTER
Results of UA from today obtained and reviewed by Dr. Rehana Dodd. Vidya Watson states he completed his antibiotic from Dr. Nikcy Santa. Culture results to be complete on Wednesday. Vidya Watson was instructed to resume radiation treatments on Thursday unless Dr. Nicky Sanat wants him to be on a longer course of antibiotics. Radiation Techs. Notified of potential return to treatment date. If antibiotic re-started, radiation to resume in one week. Vidya Watson voices understanding.

## 2021-12-07 ENCOUNTER — HOSPITAL ENCOUNTER (OUTPATIENT)
Dept: RADIATION ONCOLOGY | Facility: MEDICAL CENTER | Age: 66
Discharge: HOME | End: 2021-12-07
Attending: STUDENT IN AN ORGANIZED HEALTH CARE EDUCATION/TRAINING PROGRAM
Payer: MEDICARE

## 2021-12-07 ENCOUNTER — APPOINTMENT (OUTPATIENT)
Dept: RADIATION ONCOLOGY | Facility: MEDICAL CENTER | Age: 66
End: 2021-12-07
Attending: STUDENT IN AN ORGANIZED HEALTH CARE EDUCATION/TRAINING PROGRAM
Payer: MEDICARE

## 2021-12-08 ENCOUNTER — APPOINTMENT (OUTPATIENT)
Dept: RADIATION ONCOLOGY | Facility: MEDICAL CENTER | Age: 66
End: 2021-12-08
Attending: STUDENT IN AN ORGANIZED HEALTH CARE EDUCATION/TRAINING PROGRAM
Payer: MEDICARE

## 2021-12-08 ENCOUNTER — TELEPHONE (OUTPATIENT)
Dept: RADIATION ONCOLOGY | Facility: MEDICAL CENTER | Age: 66
End: 2021-12-08

## 2021-12-08 NOTE — TELEPHONE ENCOUNTER
Satnam call to Tamara at Dr. Radha Fink' office re: recent urine culture results. Tamara states she called the lab and they suspect ongoing infection but final organism not yet identified so that therapy can be prescribed. Dr. Efrem Chan does not want to resume therapy until treatment plan for UTI known. Phone call to Richa Man instructing him not to come in tomorrow for his radiation therapy but to wait until plan known. He voices understanding of information.

## 2021-12-09 ENCOUNTER — HOSPITAL ENCOUNTER (OUTPATIENT)
Dept: RADIATION ONCOLOGY | Facility: MEDICAL CENTER | Age: 66
End: 2021-12-09
Attending: STUDENT IN AN ORGANIZED HEALTH CARE EDUCATION/TRAINING PROGRAM
Payer: MEDICARE

## 2021-12-10 ENCOUNTER — APPOINTMENT (OUTPATIENT)
Dept: RADIATION ONCOLOGY | Facility: MEDICAL CENTER | Age: 66
End: 2021-12-10
Attending: STUDENT IN AN ORGANIZED HEALTH CARE EDUCATION/TRAINING PROGRAM
Payer: MEDICARE

## 2021-12-13 ENCOUNTER — APPOINTMENT (OUTPATIENT)
Dept: RADIATION ONCOLOGY | Facility: MEDICAL CENTER | Age: 66
End: 2021-12-13
Attending: STUDENT IN AN ORGANIZED HEALTH CARE EDUCATION/TRAINING PROGRAM
Payer: MEDICARE

## 2021-12-14 ENCOUNTER — APPOINTMENT (OUTPATIENT)
Dept: RADIATION ONCOLOGY | Facility: MEDICAL CENTER | Age: 66
End: 2021-12-14
Attending: STUDENT IN AN ORGANIZED HEALTH CARE EDUCATION/TRAINING PROGRAM
Payer: MEDICARE

## 2021-12-15 ENCOUNTER — APPOINTMENT (OUTPATIENT)
Dept: RADIATION ONCOLOGY | Facility: MEDICAL CENTER | Age: 66
End: 2021-12-15
Attending: STUDENT IN AN ORGANIZED HEALTH CARE EDUCATION/TRAINING PROGRAM
Payer: MEDICARE

## 2021-12-15 PROCEDURE — 77336 RADIATION PHYSICS CONSULT: CPT | Performed by: STUDENT IN AN ORGANIZED HEALTH CARE EDUCATION/TRAINING PROGRAM

## 2021-12-16 ENCOUNTER — APPOINTMENT (OUTPATIENT)
Dept: RADIATION ONCOLOGY | Facility: MEDICAL CENTER | Age: 66
End: 2021-12-16
Attending: STUDENT IN AN ORGANIZED HEALTH CARE EDUCATION/TRAINING PROGRAM
Payer: MEDICARE

## 2021-12-17 ENCOUNTER — APPOINTMENT (OUTPATIENT)
Dept: RADIATION ONCOLOGY | Facility: MEDICAL CENTER | Age: 66
End: 2021-12-17
Attending: STUDENT IN AN ORGANIZED HEALTH CARE EDUCATION/TRAINING PROGRAM
Payer: MEDICARE

## 2021-12-20 ENCOUNTER — HOSPITAL ENCOUNTER (OUTPATIENT)
Dept: RADIATION ONCOLOGY | Facility: MEDICAL CENTER | Age: 66
Discharge: HOME | End: 2021-12-20
Attending: STUDENT IN AN ORGANIZED HEALTH CARE EDUCATION/TRAINING PROGRAM
Payer: MEDICARE

## 2021-12-20 VITALS
RESPIRATION RATE: 18 BRPM | HEART RATE: 94 BPM | SYSTOLIC BLOOD PRESSURE: 156 MMHG | OXYGEN SATURATION: 99 % | TEMPERATURE: 96.8 F | DIASTOLIC BLOOD PRESSURE: 92 MMHG | WEIGHT: 140.6 LBS | BODY MASS INDEX: 19.07 KG/M2

## 2021-12-20 DIAGNOSIS — C61 MALIGNANT NEOPLASM OF PROSTATE (HCC): Primary | ICD-10-CM

## 2021-12-20 PROCEDURE — 77014 PR CT GUIDANCE PLACEMENT RAD THERAPY FIELDS: CPT | Performed by: STUDENT IN AN ORGANIZED HEALTH CARE EDUCATION/TRAINING PROGRAM

## 2021-12-20 PROCEDURE — 77385 HC NTSTY MODUL RAD TX DLVR SMPL: CPT | Performed by: STUDENT IN AN ORGANIZED HEALTH CARE EDUCATION/TRAINING PROGRAM

## 2021-12-20 NOTE — PROGRESS NOTES
Scandiacarrington Guerra  12/20/2021  Patient has been off therapy due to urinary tract infection. Urine in catheter cloudy but not sanguinous in color like before antibiotics. States he is almost done taking keflex. Dr. Darryl Barksdale notified of assessment and patient examined. To resume treatment today. Wt Readings from Last 3 Encounters:   12/20/21 140 lb 9.6 oz (63.8 kg)   11/27/21 142 lb (64.4 kg)   11/23/21 143 lb (64.9 kg)     Body mass index is 19.07 kg/m². Treatment Area:prostate    Patient was seen today for weekly visit. Comfort Alteration    Fatigue: Mild    Nutritional Alteration  Anorexia: No  Nausea: No  Vomiting: No     Elimination Alterations  Constipation: no  Diarrhea:  no  Urinary Frequency/Urgency:  N/A castheter  Urinary Retention:catheter  Dysuria: No  Urinary Incontinence:catheter  Proctitis: No  Nocturia: catheter     Emotional  Coping: effective      Skin Alteration   Sensation:intact  Radiation Dermatitis:  Intact [x]     Erythema  []     Discoloration  []     Rash []     Dry desquamation  []     Moist desquamation []           Injury, potential bleeding or infection:n/a    Lab Results   Component Value Date    WBC 10.3 05/15/2021     05/15/2021         BP (!) 156/92   Pulse 94   Temp 96.8 °F (36 °C) (Temporal)   Resp 18   Wt 140 lb 9.6 oz (63.8 kg)   SpO2 99%   BMI 19.07 kg/m²   Patient Currently in Pain: No               Assessment/Plan: Patient was seen today for weekly visit.       Jessica Encinas RN

## 2021-12-20 NOTE — PROGRESS NOTES
Radiation Oncology On-Treatment Visit:     Fraction # 19 / 44 (34.2 Gy)    Diagnosis:  Mr. Lucinda Pedro is a 74 y.o. male with NCCN high-risk prostate adenocarcinoma ([id number], [place] 4+3, PSA 54.65). CT abdomen/ pelvis on 2021 and bone scan on 2021 negative for regional or distant metastasis. Patient is s/p monge catheter placement at the direction of Dr. Sam Rich for persistent urinary obstruction.      Patient is s/p ADT with eligard 7.5 mg on 2021, 2021 and 2021. 22.5 mg on 10/1/2021    Treatment course: Definitive radiation to prostate and regional lymphatics    Progress note:  Mr. Lucinda Pedro was seen and evaluated. Patient has had issues with frequent urinary tract infection over the past 2 weeks. He was placed on a couple courses of antibiotics by Urology. He otherwise is feeling well. His urine is now more clear and he denies any lianna burning or hematuria. Physical exam:   VITAL SIGNS: BP (!) 156/92   Pulse 94   Temp 96.8 °F (36 °C) (Temporal)   Resp 18   Wt 140 lb 9.6 oz (63.8 kg)   SpO2 99%   BMI 19.07 kg/m²   ECO Symptomatic but completely ambulatory   General: Elderly gentleman answering questions appropriately. Plan:  · Continue radiation therapy as planned. · Bladder spasm - continue oxybutynin 5 mg TID PRN. · UTI - completed 2 courses of antibiotics. Can resume radiation treatment. · I have checked the imaging performed to date, which confirm appropriate positioning.

## 2021-12-21 ENCOUNTER — HOSPITAL ENCOUNTER (OUTPATIENT)
Dept: RADIATION ONCOLOGY | Facility: MEDICAL CENTER | Age: 66
Discharge: HOME | End: 2021-12-21
Attending: STUDENT IN AN ORGANIZED HEALTH CARE EDUCATION/TRAINING PROGRAM
Payer: MEDICARE

## 2021-12-21 PROCEDURE — 77385 HC NTSTY MODUL RAD TX DLVR SMPL: CPT | Performed by: STUDENT IN AN ORGANIZED HEALTH CARE EDUCATION/TRAINING PROGRAM

## 2021-12-21 PROCEDURE — 77427 RADIATION TX MANAGEMENT X5: CPT | Performed by: STUDENT IN AN ORGANIZED HEALTH CARE EDUCATION/TRAINING PROGRAM

## 2021-12-21 PROCEDURE — 77014 PR CT GUIDANCE PLACEMENT RAD THERAPY FIELDS: CPT | Performed by: STUDENT IN AN ORGANIZED HEALTH CARE EDUCATION/TRAINING PROGRAM

## 2021-12-22 ENCOUNTER — HOSPITAL ENCOUNTER (OUTPATIENT)
Dept: RADIATION ONCOLOGY | Facility: MEDICAL CENTER | Age: 66
Discharge: HOME | End: 2021-12-22
Attending: STUDENT IN AN ORGANIZED HEALTH CARE EDUCATION/TRAINING PROGRAM
Payer: MEDICARE

## 2021-12-22 PROCEDURE — 77385 HC NTSTY MODUL RAD TX DLVR SMPL: CPT | Performed by: STUDENT IN AN ORGANIZED HEALTH CARE EDUCATION/TRAINING PROGRAM

## 2021-12-22 PROCEDURE — 77014 PR CT GUIDANCE PLACEMENT RAD THERAPY FIELDS: CPT | Performed by: STUDENT IN AN ORGANIZED HEALTH CARE EDUCATION/TRAINING PROGRAM

## 2021-12-23 ENCOUNTER — HOSPITAL ENCOUNTER (OUTPATIENT)
Dept: RADIATION ONCOLOGY | Facility: MEDICAL CENTER | Age: 66
Discharge: HOME | End: 2021-12-23
Attending: STUDENT IN AN ORGANIZED HEALTH CARE EDUCATION/TRAINING PROGRAM
Payer: MEDICARE

## 2021-12-23 PROCEDURE — 77385 HC NTSTY MODUL RAD TX DLVR SMPL: CPT | Performed by: STUDENT IN AN ORGANIZED HEALTH CARE EDUCATION/TRAINING PROGRAM

## 2021-12-23 PROCEDURE — 77014 PR CT GUIDANCE PLACEMENT RAD THERAPY FIELDS: CPT | Performed by: STUDENT IN AN ORGANIZED HEALTH CARE EDUCATION/TRAINING PROGRAM

## 2021-12-23 NOTE — PROGRESS NOTES
Nutrition Brief: Wt loss trigger    Wt Readings from Last 5 Encounters:   12/20/21 140 lb 9.6 oz (63.8 kg)   11/27/21 142 lb (64.4 kg)   11/23/21 143 lb (64.9 kg)   11/16/21 147 lb 8 oz (66.9 kg)   11/09/21 145 lb (65.8 kg)        Evaluation: Weight stable. Last reported weight writer had was 147# 1 month ago- pt reports this was not an accurate weight and he might of had his boots and coat on when being weighed. He continues to report UBW~ 140#. Pt seen with his wife- they report he has 2 meals and 3 snacks per day (sweets). Pt daughters are STNAs and are monitoring his wt and po intake. Discussed ways for weight/muscle gain with pt wife. Discussed ONS- pt states Ensure Enlive causes him to \"bind up\"- pt urologist do not want him to bind up due to strain it would put on his catheter etc.     Casey Gisellalakelsie has 10% iron per bottle vs Ensure Enlive 25%. If patient needs ONS would recommend trying [employer] Nutrition Supplements. Recommendations    1. Continue to monitor weights and change in nutrition alterations  2.  If ONS is needed recommend trying [address], [address] [place] [address],   Office Number: [phone]

## 2021-12-27 ENCOUNTER — HOSPITAL ENCOUNTER (OUTPATIENT)
Dept: RADIATION ONCOLOGY | Facility: MEDICAL CENTER | Age: 66
Discharge: HOME | End: 2021-12-27
Attending: STUDENT IN AN ORGANIZED HEALTH CARE EDUCATION/TRAINING PROGRAM
Payer: MEDICARE

## 2021-12-27 PROCEDURE — 77336 RADIATION PHYSICS CONSULT: CPT | Performed by: STUDENT IN AN ORGANIZED HEALTH CARE EDUCATION/TRAINING PROGRAM

## 2021-12-27 PROCEDURE — 77385 HC NTSTY MODUL RAD TX DLVR SMPL: CPT | Performed by: STUDENT IN AN ORGANIZED HEALTH CARE EDUCATION/TRAINING PROGRAM

## 2021-12-27 PROCEDURE — 77014 PR CT GUIDANCE PLACEMENT RAD THERAPY FIELDS: CPT | Performed by: STUDENT IN AN ORGANIZED HEALTH CARE EDUCATION/TRAINING PROGRAM

## 2021-12-28 ENCOUNTER — HOSPITAL ENCOUNTER (OUTPATIENT)
Dept: RADIATION ONCOLOGY | Facility: MEDICAL CENTER | Age: 66
Discharge: HOME | End: 2021-12-28
Attending: STUDENT IN AN ORGANIZED HEALTH CARE EDUCATION/TRAINING PROGRAM
Payer: MEDICARE

## 2021-12-28 VITALS
TEMPERATURE: 97.1 F | DIASTOLIC BLOOD PRESSURE: 104 MMHG | OXYGEN SATURATION: 98 % | WEIGHT: 140.8 LBS | RESPIRATION RATE: 18 BRPM | SYSTOLIC BLOOD PRESSURE: 166 MMHG | HEART RATE: 105 BPM | BODY MASS INDEX: 19.1 KG/M2

## 2021-12-28 DIAGNOSIS — C61 MALIGNANT NEOPLASM OF PROSTATE (HCC): Primary | ICD-10-CM

## 2021-12-28 PROCEDURE — 77014 PR CT GUIDANCE PLACEMENT RAD THERAPY FIELDS: CPT | Performed by: STUDENT IN AN ORGANIZED HEALTH CARE EDUCATION/TRAINING PROGRAM

## 2021-12-28 PROCEDURE — 77385 HC NTSTY MODUL RAD TX DLVR SMPL: CPT | Performed by: STUDENT IN AN ORGANIZED HEALTH CARE EDUCATION/TRAINING PROGRAM

## 2021-12-28 RX ORDER — CIPROFLOXACIN 500 MG/1
500 TABLET, FILM COATED ORAL 2 TIMES DAILY
COMMUNITY
End: 2022-01-11

## 2021-12-28 NOTE — PROGRESS NOTES
Radiation Oncology On-Treatment Visit:     Fraction # 24 / 44 (43.2 Gy)    Diagnosis:  Mr. Meagan Pires is a 74 y.o. male with NCCN high-risk prostate adenocarcinoma ([id number], [place] 4+3, PSA 54.65). CT abdomen/ pelvis on 2021 and bone scan on 2021 negative for regional or distant metastasis. Patient is s/p monge catheter placement at the direction of Dr. Chin Vivas for persistent urinary obstruction.      Patient is s/p ADT with eligard 7.5 mg on 2021, 2021 and 2021. 22.5 mg on 10/1/2021    Treatment course: Definitive radiation to prostate and regional lymphatics    Progress note:  Mr. Meagan Pires was seen and evaluated. Urine color is improving. Passed a few kidney stones. He is hydrating well. Denies loose or bloody bowel movements. Physical exam:   VITAL SIGNS: BP (!) 166/104 Comment: pt stated he has white coat syndrome  Pulse 105   Temp 97.1 °F (36.2 °C) (Temporal)   Resp 18   Wt 140 lb 12.8 oz (63.9 kg)   SpO2 98%   BMI 19.10 kg/m²   ECO Symptomatic but completely ambulatory   General: Elderly gentleman answering questions appropriately. Plan:  · Continue radiation therapy as planned. · Bladder spasm - continue oxybutynin 5 mg TID PRN. · UTI - currently on cipro as prescribed by his urologist. Continue current course. · I have checked the imaging performed to date, which confirm appropriate positioning.

## 2021-12-28 NOTE — PROGRESS NOTES
Luis A Ulises  12/28/2021  Wt Readings from Last 3 Encounters:   12/28/21 140 lb 12.8 oz (63.9 kg)   12/20/21 140 lb 9.6 oz (63.8 kg)   11/27/21 142 lb (64.4 kg)     Body mass index is 19.1 kg/m². Treatment Area: prostate    Patient was seen today for weekly visit. Comfort Alteration    Fatigue: Moderate    Nutritional Alteration  Anorexia: No  Nausea: No  Vomiting: No     Elimination Alterations  Constipation: no  Diarrhea:  no  Urinary Frequency/Urgency: monge   Urinary Retention: No  Dysuria: No  Urinary Incontinence: No  Proctitis: No  Nocturia: No #/night: monge     Emotional  Coping: effective      Skin Alteration   Sensation:no issues     Radiation Dermatitis:  Intact [x]     Erythema  []     Discoloration  []     Rash []     Dry desquamation  []     Moist desquamation []           Injury, potential bleeding or infection: on antibiotics for bladder infections     Lab Results   Component Value Date    WBC 10.3 05/15/2021     05/15/2021         BP (!) 166/104 Comment: pt stated he has white coat syndrome  Pulse 105   Temp 97.1 °F (36.2 °C) (Temporal)   Resp 18   Wt 140 lb 12.8 oz (63.9 kg)   SpO2 98%   BMI 19.10 kg/m²   Patient Currently in Pain: No               Assessment/Plan: Patient was seen today for weekly visit. NO major issues today. Pt continues to have monge placed. He noted a possible stone passed last night. Dr. Sharmaine Carrero updated and examined pt. Per MD pt will continue as planned.       Sophie Chang, RN

## 2021-12-29 ENCOUNTER — HOSPITAL ENCOUNTER (OUTPATIENT)
Dept: RADIATION ONCOLOGY | Facility: MEDICAL CENTER | Age: 66
Discharge: HOME | End: 2021-12-29
Attending: STUDENT IN AN ORGANIZED HEALTH CARE EDUCATION/TRAINING PROGRAM
Payer: MEDICARE

## 2021-12-29 PROCEDURE — 77427 RADIATION TX MANAGEMENT X5: CPT | Performed by: STUDENT IN AN ORGANIZED HEALTH CARE EDUCATION/TRAINING PROGRAM

## 2021-12-29 PROCEDURE — 77385 HC NTSTY MODUL RAD TX DLVR SMPL: CPT | Performed by: STUDENT IN AN ORGANIZED HEALTH CARE EDUCATION/TRAINING PROGRAM

## 2021-12-29 PROCEDURE — 77014 PR CT GUIDANCE PLACEMENT RAD THERAPY FIELDS: CPT | Performed by: STUDENT IN AN ORGANIZED HEALTH CARE EDUCATION/TRAINING PROGRAM

## 2021-12-29 PROCEDURE — 77336 RADIATION PHYSICS CONSULT: CPT | Performed by: STUDENT IN AN ORGANIZED HEALTH CARE EDUCATION/TRAINING PROGRAM

## 2021-12-30 ENCOUNTER — HOSPITAL ENCOUNTER (OUTPATIENT)
Dept: RADIATION ONCOLOGY | Facility: MEDICAL CENTER | Age: 66
Discharge: HOME | End: 2021-12-30
Attending: STUDENT IN AN ORGANIZED HEALTH CARE EDUCATION/TRAINING PROGRAM
Payer: MEDICARE

## 2021-12-30 PROCEDURE — 77014 PR CT GUIDANCE PLACEMENT RAD THERAPY FIELDS: CPT | Performed by: STUDENT IN AN ORGANIZED HEALTH CARE EDUCATION/TRAINING PROGRAM

## 2021-12-30 PROCEDURE — 77385 HC NTSTY MODUL RAD TX DLVR SMPL: CPT | Performed by: STUDENT IN AN ORGANIZED HEALTH CARE EDUCATION/TRAINING PROGRAM

## 2021-12-30 NOTE — PROGRESS NOTES
Nutrition Brief: Wt stable    Wt Readings from Last 5 Encounters:   12/28/21 140 lb 12.8 oz (63.9 kg)   12/20/21 140 lb 9.6 oz (63.8 kg)       Evaluation:  Wts assessed from 12/20 to 12/28: weight stable. Moderate fatigue reported. No change in nutrition alterations. Had long discussion with pt wife regarding increasing calories and protein for wt gain. ONS recommendations: [employer] ONS as it has 10% iron per bottle vs Ensure Enlive 25%. (pt reports Ensure binding him up due to too much iron). Recommendations     1. Continue to monitor weights and change in nutrition alterations  2.  If ONS is needed recommend trying [facility] Nutrition Supplements     Hang Padilla, NANCY, LD  Office Number: [phone]

## 2022-01-03 ENCOUNTER — HOSPITAL ENCOUNTER (OUTPATIENT)
Dept: RADIATION ONCOLOGY | Facility: MEDICAL CENTER | Age: 67
Discharge: HOME | End: 2022-01-03
Attending: STUDENT IN AN ORGANIZED HEALTH CARE EDUCATION/TRAINING PROGRAM
Payer: MEDICARE

## 2022-01-03 PROCEDURE — 77385 HC NTSTY MODUL RAD TX DLVR SMPL: CPT | Performed by: STUDENT IN AN ORGANIZED HEALTH CARE EDUCATION/TRAINING PROGRAM

## 2022-01-03 PROCEDURE — 77014 PR CT GUIDANCE PLACEMENT RAD THERAPY FIELDS: CPT | Performed by: STUDENT IN AN ORGANIZED HEALTH CARE EDUCATION/TRAINING PROGRAM

## 2022-01-04 ENCOUNTER — HOSPITAL ENCOUNTER (OUTPATIENT)
Dept: RADIATION ONCOLOGY | Facility: MEDICAL CENTER | Age: 67
Discharge: HOME | End: 2022-01-04
Attending: STUDENT IN AN ORGANIZED HEALTH CARE EDUCATION/TRAINING PROGRAM
Payer: MEDICARE

## 2022-01-04 VITALS
DIASTOLIC BLOOD PRESSURE: 97 MMHG | RESPIRATION RATE: 18 BRPM | BODY MASS INDEX: 19.04 KG/M2 | TEMPERATURE: 96.7 F | HEART RATE: 97 BPM | SYSTOLIC BLOOD PRESSURE: 178 MMHG | WEIGHT: 140.4 LBS | OXYGEN SATURATION: 98 %

## 2022-01-04 DIAGNOSIS — C61 MALIGNANT NEOPLASM OF PROSTATE (HCC): Primary | ICD-10-CM

## 2022-01-04 PROCEDURE — 77014 PR CT GUIDANCE PLACEMENT RAD THERAPY FIELDS: CPT | Performed by: STUDENT IN AN ORGANIZED HEALTH CARE EDUCATION/TRAINING PROGRAM

## 2022-01-04 PROCEDURE — 77385 HC NTSTY MODUL RAD TX DLVR SMPL: CPT | Performed by: STUDENT IN AN ORGANIZED HEALTH CARE EDUCATION/TRAINING PROGRAM

## 2022-01-04 NOTE — PROGRESS NOTES
Zorita Apley  1/4/2022  Wt Readings from Last 3 Encounters:   01/04/22 140 lb 6.4 oz (63.7 kg)   12/28/21 140 lb 12.8 oz (63.9 kg)   12/20/21 140 lb 9.6 oz (63.8 kg)     Body mass index is 19.04 kg/m². Treatment Area: prostate     Patient was seen today for weekly visit. Comfort Alteration    Fatigue: Moderate    Nutritional Alteration  Anorexia: No  Nausea: No  Vomiting: No     Elimination Alterations  Constipation: no  Diarrhea:  yes  Urinary Frequency/Urgency: No  Urinary Retention: No  Dysuria: No  Urinary Incontinence: No  Proctitis: No  Nocturia: No #/night: Paige in place      Emotional  Coping: somewhat effective      Skin Alteration   Sensation:no issues     Radiation Dermatitis:  Intact [x]     Erythema  []     Discoloration  []     Rash []     Dry desquamation  []     Moist desquamation []           Injury, potential bleeding or infection: no issues or concerns     Lab Results   Component Value Date    WBC 10.3 05/15/2021     05/15/2021         BP (!) 178/97   Pulse 97   Temp 96.7 °F (35.9 °C) (Temporal)   Resp 18   Wt 140 lb 6.4 oz (63.7 kg)   SpO2 98%   BMI 19.04 kg/m²   Patient Currently in Pain: No               Assessment/Plan: Patient was seen today for weekly visit. NO issues or concerns. Dr. Staci Kidd updated and examined pt. Per MD pt will continue as planned.      Bess Lackey RN

## 2022-01-04 NOTE — PROGRESS NOTES
Radiation Oncology On-Treatment Visit:     Fraction # 28 / 44 (50.4 Gy)    Diagnosis:  Mr. Jose Manuel Flowers is a 74 y.o. male with NCCN high-risk prostate adenocarcinoma ([id number], [place] 4+3, PSA 54.65). CT abdomen/ pelvis on 2021 and bone scan on 2021 negative for regional or distant metastasis. Patient is s/p monge catheter placement at the direction of Dr. Tom Calderón for persistent urinary obstruction.      Patient is s/p ADT with eligard 7.5 mg on 2021, 2021 and 2021. 22.5 mg on 10/1/2021    Treatment course: Definitive radiation to prostate and regional lymphatics    Progress note:  Mr. Jose Manuel Flowers was seen and evaluated. Patient notes some diarrhea this week. Otherwise doing well. Physical exam:   VITAL SIGNS: BP (!) 178/97   Pulse 97   Temp 96.7 °F (35.9 °C) (Temporal)   Resp 18   Wt 140 lb 6.4 oz (63.7 kg)   SpO2 98%   BMI 19.04 kg/m²   ECO Symptomatic but completely ambulatory   General: Elderly gentleman answering questions appropriately. Plan:  · Continue radiation therapy as planned. · Bladder spasm - continue oxybutynin 5 mg TID PRN. · UTI - completed 2 courses of antibiotics. · Diarrhea: Advised imodium use. · I have checked the imaging performed to date, which confirm appropriate positioning. 1

## 2022-01-05 ENCOUNTER — HOSPITAL ENCOUNTER (OUTPATIENT)
Dept: RADIATION ONCOLOGY | Facility: MEDICAL CENTER | Age: 67
Discharge: HOME | End: 2022-01-05
Attending: STUDENT IN AN ORGANIZED HEALTH CARE EDUCATION/TRAINING PROGRAM
Payer: MEDICARE

## 2022-01-05 PROCEDURE — 77385 HC NTSTY MODUL RAD TX DLVR SMPL: CPT | Performed by: STUDENT IN AN ORGANIZED HEALTH CARE EDUCATION/TRAINING PROGRAM

## 2022-01-05 PROCEDURE — 77014 PR CT GUIDANCE PLACEMENT RAD THERAPY FIELDS: CPT | Performed by: STUDENT IN AN ORGANIZED HEALTH CARE EDUCATION/TRAINING PROGRAM

## 2022-01-06 ENCOUNTER — APPOINTMENT (OUTPATIENT)
Dept: RADIATION ONCOLOGY | Facility: MEDICAL CENTER | Age: 67
End: 2022-01-06
Attending: STUDENT IN AN ORGANIZED HEALTH CARE EDUCATION/TRAINING PROGRAM
Payer: MEDICARE

## 2022-01-06 ENCOUNTER — HOSPITAL ENCOUNTER (OUTPATIENT)
Dept: RADIATION ONCOLOGY | Facility: MEDICAL CENTER | Age: 67
Discharge: HOME | End: 2022-01-06
Attending: STUDENT IN AN ORGANIZED HEALTH CARE EDUCATION/TRAINING PROGRAM
Payer: MEDICARE

## 2022-01-06 PROCEDURE — 77385 HC NTSTY MODUL RAD TX DLVR SMPL: CPT | Performed by: STUDENT IN AN ORGANIZED HEALTH CARE EDUCATION/TRAINING PROGRAM

## 2022-01-06 PROCEDURE — 77014 PR CT GUIDANCE PLACEMENT RAD THERAPY FIELDS: CPT | Performed by: STUDENT IN AN ORGANIZED HEALTH CARE EDUCATION/TRAINING PROGRAM

## 2022-01-06 PROCEDURE — 77427 RADIATION TX MANAGEMENT X5: CPT | Performed by: STUDENT IN AN ORGANIZED HEALTH CARE EDUCATION/TRAINING PROGRAM

## 2022-01-06 NOTE — PROGRESS NOTES
Nutrition Brief: Wt stable    Wt Readings from Last 5 Encounters:   01/04/22 140 lb 6.4 oz (63.7 kg)   12/28/21 140 lb 12.8 oz (63.9 kg)       Evaluation:  Wts assessed x past week: stable. Moderate fatigue reported. No change in nutrition alterations. Reported some loose stools - encouraged to use Imodium     Recommendations     1. Continue to monitor weights and change in nutrition alterations  2.  If ONS is needed recommend trying [employer] Nutrition Supplements     Kelly Torres RD, LD  Office Number: [phone]

## 2022-01-07 ENCOUNTER — HOSPITAL ENCOUNTER (OUTPATIENT)
Dept: RADIATION ONCOLOGY | Facility: MEDICAL CENTER | Age: 67
Discharge: HOME | End: 2022-01-07
Attending: STUDENT IN AN ORGANIZED HEALTH CARE EDUCATION/TRAINING PROGRAM
Payer: MEDICARE

## 2022-01-07 PROCEDURE — 77014 PR CT GUIDANCE PLACEMENT RAD THERAPY FIELDS: CPT | Performed by: STUDENT IN AN ORGANIZED HEALTH CARE EDUCATION/TRAINING PROGRAM

## 2022-01-07 PROCEDURE — 77385 HC NTSTY MODUL RAD TX DLVR SMPL: CPT | Performed by: STUDENT IN AN ORGANIZED HEALTH CARE EDUCATION/TRAINING PROGRAM

## 2022-01-10 ENCOUNTER — TELEPHONE (OUTPATIENT)
Dept: ONCOLOGY | Age: 67
End: 2022-01-10

## 2022-01-10 ENCOUNTER — HOSPITAL ENCOUNTER (OUTPATIENT)
Dept: RADIATION ONCOLOGY | Facility: MEDICAL CENTER | Age: 67
Discharge: HOME | End: 2022-01-10
Attending: STUDENT IN AN ORGANIZED HEALTH CARE EDUCATION/TRAINING PROGRAM
Payer: MEDICARE

## 2022-01-10 PROCEDURE — 77014 PR CT GUIDANCE PLACEMENT RAD THERAPY FIELDS: CPT | Performed by: STUDENT IN AN ORGANIZED HEALTH CARE EDUCATION/TRAINING PROGRAM

## 2022-01-10 PROCEDURE — 77385 HC NTSTY MODUL RAD TX DLVR SMPL: CPT | Performed by: STUDENT IN AN ORGANIZED HEALTH CARE EDUCATION/TRAINING PROGRAM

## 2022-01-10 NOTE — TELEPHONE ENCOUNTER
Name: Sascha Corona  : 1955  MRN: [id number]    Oncology Navigation Follow-Up Note    Contact Type:  Telephone    Notes: Writer called patient to check on him and to assess any needs he may have. He states radiation is going well and states \"Thats a good group of people who are caring for me there\" Patient appreciative of call and check in. Will continue to follow.     Electronically signed by Mahsa Almazan RN on 1/10/2022 at 11:20 AM

## 2022-01-11 ENCOUNTER — HOSPITAL ENCOUNTER (OUTPATIENT)
Dept: RADIATION ONCOLOGY | Facility: MEDICAL CENTER | Age: 67
Discharge: HOME | End: 2022-01-11
Attending: STUDENT IN AN ORGANIZED HEALTH CARE EDUCATION/TRAINING PROGRAM
Payer: MEDICARE

## 2022-01-11 VITALS
TEMPERATURE: 97.1 F | BODY MASS INDEX: 19.15 KG/M2 | RESPIRATION RATE: 20 BRPM | WEIGHT: 141.2 LBS | DIASTOLIC BLOOD PRESSURE: 102 MMHG | OXYGEN SATURATION: 99 % | HEART RATE: 96 BPM | SYSTOLIC BLOOD PRESSURE: 165 MMHG

## 2022-01-11 DIAGNOSIS — C61 MALIGNANT NEOPLASM OF PROSTATE (HCC): Primary | ICD-10-CM

## 2022-01-11 PROCEDURE — 77336 RADIATION PHYSICS CONSULT: CPT | Performed by: STUDENT IN AN ORGANIZED HEALTH CARE EDUCATION/TRAINING PROGRAM

## 2022-01-11 PROCEDURE — 77385 HC NTSTY MODUL RAD TX DLVR SMPL: CPT | Performed by: STUDENT IN AN ORGANIZED HEALTH CARE EDUCATION/TRAINING PROGRAM

## 2022-01-11 PROCEDURE — 77014 PR CT GUIDANCE PLACEMENT RAD THERAPY FIELDS: CPT | Performed by: STUDENT IN AN ORGANIZED HEALTH CARE EDUCATION/TRAINING PROGRAM

## 2022-01-11 ASSESSMENT — PAIN DESCRIPTION - LOCATION: LOCATION: PELVIS;PENIS

## 2022-01-11 ASSESSMENT — PAIN DESCRIPTION - PAIN TYPE: TYPE: ACUTE PAIN

## 2022-01-11 NOTE — PROGRESS NOTES
Tobi Palacios  1/11/2022  Wt Readings from Last 3 Encounters:   01/11/22 141 lb 3.2 oz (64 kg)   01/04/22 140 lb 6.4 oz (63.7 kg)   12/28/21 140 lb 12.8 oz (63.9 kg)     Body mass index is 19.15 kg/m². Treatment Area: prostate     Patient was seen today for weekly visit. Comfort Alteration    Fatigue: Mild    Nutritional Alteration  Anorexia: No  Nausea: No  Vomiting: No     Elimination Alterations  Constipation: yes  Diarrhea:  no  Urinary Frequency/Urgency: No  Urinary Retention: No  Dysuria: No  Urinary Incontinence: No  Proctitis: No  Nocturia: No #/night: Cath in place. Discomfort from cath, but no other issues      Emotional  Coping: somewhat effective      Skin Alteration   Sensation: no concerns     Radiation Dermatitis:  Intact [x]     Erythema  []     Discoloration  []     Rash []     Dry desquamation  []     Moist desquamation []           Injury, potential bleeding or infection: no issues     Lab Results   Component Value Date    WBC 10.3 05/15/2021     05/15/2021         BP (S) (!) 165/102 Comment: pt stated he has white coat syndrome, not elevated at home  Pulse 96   Temp 97.1 °F (36.2 °C) (Temporal)   Resp 20   Wt 141 lb 3.2 oz (64 kg)   SpO2 99%   BMI 19.15 kg/m²   Patient Currently in Pain: Yes  Pain Assessment: 0-10            Assessment/Plan: Patient was seen today for weekly visit. No major issues or concerns at this time. Dr. Rodriguez Shukla updated and examined pt. Per MD pt will continue as planned.      Eden Smith RN

## 2022-01-11 NOTE — PROGRESS NOTES
Radiation Oncology On-Treatment Visit:     Fraction # 33 / 44 (59.4 Gy)    Diagnosis:  Mr. Blanca Gunn is a 74 y.o. male with NCCN high-risk prostate adenocarcinoma ([id number], [place] 4+3, PSA 54.65). CT abdomen/ pelvis on 2021 and bone scan on 2021 negative for regional or distant metastasis. Patient is s/p monge catheter placement at the direction of Dr. Danielle Biggs for persistent urinary obstruction.      Patient is s/p ADT with eligard 7.5 mg on 2021, 2021 and 2021. 22.5 mg on 10/1/2021    Treatment course: Definitive radiation to prostate and regional lymphatics    Progress note:  Mr. Blanca Gunn was seen and evaluated. Patient notes some constipation this week. Otherwise doing well. Physical exam:   VITAL SIGNS: BP (S) (!) 165/102 Comment: pt stated he has white coat syndrome, not elevated at home  Pulse 96   Temp 97.1 °F (36.2 °C) (Temporal)   Resp 20   Wt 141 lb 3.2 oz (64 kg)   SpO2 99%   BMI 19.15 kg/m²   ECO Symptomatic but completely ambulatory   General: Elderly gentleman answering questions appropriately. Plan:  · Continue radiation therapy as planned. · Bladder spasm - continue oxybutynin 5 mg TID PRN. · UTI - completed 2 courses of antibiotics. · Diarrhea: Resolved. Imodium PRN. · I have checked the imaging performed to date, which confirm appropriate positioning.

## 2022-01-12 ENCOUNTER — HOSPITAL ENCOUNTER (OUTPATIENT)
Dept: RADIATION ONCOLOGY | Facility: MEDICAL CENTER | Age: 67
Discharge: HOME | End: 2022-01-12
Attending: STUDENT IN AN ORGANIZED HEALTH CARE EDUCATION/TRAINING PROGRAM
Payer: MEDICARE

## 2022-01-12 PROCEDURE — 77336 RADIATION PHYSICS CONSULT: CPT | Performed by: STUDENT IN AN ORGANIZED HEALTH CARE EDUCATION/TRAINING PROGRAM

## 2022-01-12 PROCEDURE — 77014 PR CT GUIDANCE PLACEMENT RAD THERAPY FIELDS: CPT | Performed by: STUDENT IN AN ORGANIZED HEALTH CARE EDUCATION/TRAINING PROGRAM

## 2022-01-12 PROCEDURE — 77385 HC NTSTY MODUL RAD TX DLVR SMPL: CPT | Performed by: STUDENT IN AN ORGANIZED HEALTH CARE EDUCATION/TRAINING PROGRAM

## 2022-01-13 ENCOUNTER — HOSPITAL ENCOUNTER (OUTPATIENT)
Dept: RADIATION ONCOLOGY | Facility: MEDICAL CENTER | Age: 67
Discharge: HOME | End: 2022-01-13
Attending: STUDENT IN AN ORGANIZED HEALTH CARE EDUCATION/TRAINING PROGRAM
Payer: MEDICARE

## 2022-01-13 PROCEDURE — 77014 PR CT GUIDANCE PLACEMENT RAD THERAPY FIELDS: CPT | Performed by: STUDENT IN AN ORGANIZED HEALTH CARE EDUCATION/TRAINING PROGRAM

## 2022-01-13 PROCEDURE — 77385 HC NTSTY MODUL RAD TX DLVR SMPL: CPT | Performed by: STUDENT IN AN ORGANIZED HEALTH CARE EDUCATION/TRAINING PROGRAM

## 2022-01-13 PROCEDURE — 77427 RADIATION TX MANAGEMENT X5: CPT | Performed by: STUDENT IN AN ORGANIZED HEALTH CARE EDUCATION/TRAINING PROGRAM

## 2022-01-13 NOTE — PROGRESS NOTES
Nutrition Brief: Wt stable    Wt Readings from Last 5 Encounters:   01/11/22 141 lb 3.2 oz (64 kg)   01/04/22 140 lb 6.4 oz (63.7 kg)   12/28/21 140 lb 12.8 oz (63.9 kg)   12/20/21 140 lb 9.6 oz (63.8 kg)   11/27/21 142 lb (64.4 kg)        Evaluation:  Wr stable since 11/27. Mild fatigue reported. No change in nutrition alterations. Pt reports some constipation. Will reassess next week     Recommendations:  1. Will continue to monitor change in weights and nutrition alterations   2.  ONS as needed: [address] [place] [address], [address] [place] [address]  Office Number: [phone]

## 2022-01-14 ENCOUNTER — HOSPITAL ENCOUNTER (OUTPATIENT)
Dept: RADIATION ONCOLOGY | Facility: MEDICAL CENTER | Age: 67
Discharge: HOME | End: 2022-01-14
Attending: STUDENT IN AN ORGANIZED HEALTH CARE EDUCATION/TRAINING PROGRAM
Payer: MEDICARE

## 2022-01-14 PROCEDURE — 77014 PR CT GUIDANCE PLACEMENT RAD THERAPY FIELDS: CPT | Performed by: STUDENT IN AN ORGANIZED HEALTH CARE EDUCATION/TRAINING PROGRAM

## 2022-01-14 PROCEDURE — 77385 HC NTSTY MODUL RAD TX DLVR SMPL: CPT | Performed by: STUDENT IN AN ORGANIZED HEALTH CARE EDUCATION/TRAINING PROGRAM

## 2022-01-17 ENCOUNTER — HOSPITAL ENCOUNTER (OUTPATIENT)
Dept: RADIATION ONCOLOGY | Facility: MEDICAL CENTER | Age: 67
Discharge: HOME | End: 2022-01-17
Attending: STUDENT IN AN ORGANIZED HEALTH CARE EDUCATION/TRAINING PROGRAM
Payer: MEDICARE

## 2022-01-17 PROCEDURE — 77385 HC NTSTY MODUL RAD TX DLVR SMPL: CPT | Performed by: STUDENT IN AN ORGANIZED HEALTH CARE EDUCATION/TRAINING PROGRAM

## 2022-01-17 PROCEDURE — 77014 PR CT GUIDANCE PLACEMENT RAD THERAPY FIELDS: CPT | Performed by: STUDENT IN AN ORGANIZED HEALTH CARE EDUCATION/TRAINING PROGRAM

## 2022-01-18 ENCOUNTER — HOSPITAL ENCOUNTER (OUTPATIENT)
Dept: RADIATION ONCOLOGY | Facility: MEDICAL CENTER | Age: 67
Discharge: HOME | End: 2022-01-18
Attending: STUDENT IN AN ORGANIZED HEALTH CARE EDUCATION/TRAINING PROGRAM
Payer: MEDICARE

## 2022-01-18 VITALS
TEMPERATURE: 97.1 F | WEIGHT: 141.2 LBS | OXYGEN SATURATION: 99 % | BODY MASS INDEX: 19.15 KG/M2 | RESPIRATION RATE: 18 BRPM | DIASTOLIC BLOOD PRESSURE: 113 MMHG | HEART RATE: 117 BPM | SYSTOLIC BLOOD PRESSURE: 186 MMHG

## 2022-01-18 DIAGNOSIS — C61 MALIGNANT NEOPLASM OF PROSTATE (HCC): Primary | ICD-10-CM

## 2022-01-18 PROCEDURE — 77014 PR CT GUIDANCE PLACEMENT RAD THERAPY FIELDS: CPT | Performed by: STUDENT IN AN ORGANIZED HEALTH CARE EDUCATION/TRAINING PROGRAM

## 2022-01-18 PROCEDURE — 77336 RADIATION PHYSICS CONSULT: CPT | Performed by: STUDENT IN AN ORGANIZED HEALTH CARE EDUCATION/TRAINING PROGRAM

## 2022-01-18 PROCEDURE — 77385 HC NTSTY MODUL RAD TX DLVR SMPL: CPT | Performed by: STUDENT IN AN ORGANIZED HEALTH CARE EDUCATION/TRAINING PROGRAM

## 2022-01-18 ASSESSMENT — PAIN DESCRIPTION - PAIN TYPE: TYPE: ACUTE PAIN

## 2022-01-18 ASSESSMENT — PAIN SCALES - GENERAL: PAINLEVEL_OUTOF10: 3

## 2022-01-18 ASSESSMENT — PAIN DESCRIPTION - DESCRIPTORS: DESCRIPTORS: SPASM

## 2022-01-18 NOTE — PROGRESS NOTES
Radiation Oncology On-Treatment Visit:     Fraction # 38 / 44 (68.4 Gy)    Diagnosis:  Mr. Lise Lopez is a 74 y.o. male with NCCN high-risk prostate adenocarcinoma ([id number], [place] 4+3, PSA 54.65). CT abdomen/ pelvis on 2021 and bone scan on 2021 negative for regional or distant metastasis. Patient is s/p monge catheter placement at the direction of Dr. Shirlyn Halsted for persistent urinary obstruction.      Patient is s/p ADT with eligard 7.5 mg on 2021, 2021 and 2021. 22.5 mg on 10/1/2021    Treatment course: Definitive radiation to prostate and regional lymphatics    Progress note:  Mr. Lise Lopez was seen and evaluated. Patient notes some increased cramping and bladder spasm today. Otherwise doing well. Physical exam:   VITAL SIGNS: BP (!) 186/113 Comment: pt denies symptoms, anxious to see urology today  Pulse 117   Temp 97.1 °F (36.2 °C) (Temporal)   Resp 18   Wt 141 lb 3.2 oz (64 kg)   SpO2 99%   BMI 19.15 kg/m²   ECO Symptomatic but completely ambulatory   General: Elderly gentleman answering questions appropriately. Plan:  · Continue radiation therapy as planned. · Bladder spasm - continue oxybutynin 5 mg TID PRN. Patient will be seen by Urology today and have monge exchanged. · UTI - completed 2 courses of antibiotics. · Diarrhea: Resolved. Imodium PRN. · I have checked the imaging performed to date, which confirm appropriate positioning.

## 2022-01-19 ENCOUNTER — HOSPITAL ENCOUNTER (OUTPATIENT)
Dept: RADIATION ONCOLOGY | Facility: MEDICAL CENTER | Age: 67
Discharge: HOME | End: 2022-01-19
Attending: STUDENT IN AN ORGANIZED HEALTH CARE EDUCATION/TRAINING PROGRAM
Payer: MEDICARE

## 2022-01-19 PROCEDURE — 77014 PR CT GUIDANCE PLACEMENT RAD THERAPY FIELDS: CPT | Performed by: STUDENT IN AN ORGANIZED HEALTH CARE EDUCATION/TRAINING PROGRAM

## 2022-01-19 PROCEDURE — 77385 HC NTSTY MODUL RAD TX DLVR SMPL: CPT | Performed by: STUDENT IN AN ORGANIZED HEALTH CARE EDUCATION/TRAINING PROGRAM

## 2022-01-20 ENCOUNTER — HOSPITAL ENCOUNTER (OUTPATIENT)
Dept: RADIATION ONCOLOGY | Facility: MEDICAL CENTER | Age: 67
Discharge: HOME | End: 2022-01-20
Attending: STUDENT IN AN ORGANIZED HEALTH CARE EDUCATION/TRAINING PROGRAM
Payer: MEDICARE

## 2022-01-20 PROCEDURE — 77014 PR CT GUIDANCE PLACEMENT RAD THERAPY FIELDS: CPT | Performed by: STUDENT IN AN ORGANIZED HEALTH CARE EDUCATION/TRAINING PROGRAM

## 2022-01-20 PROCEDURE — 77427 RADIATION TX MANAGEMENT X5: CPT | Performed by: STUDENT IN AN ORGANIZED HEALTH CARE EDUCATION/TRAINING PROGRAM

## 2022-01-20 PROCEDURE — 77385 HC NTSTY MODUL RAD TX DLVR SMPL: CPT | Performed by: STUDENT IN AN ORGANIZED HEALTH CARE EDUCATION/TRAINING PROGRAM

## 2022-01-20 NOTE — PROGRESS NOTES
Nutrition Brief: Wt stable    Wt Readings from Last 5 Encounters:   01/18/22 141 lb 3.2 oz (64 kg)   01/11/22 141 lb 3.2 oz (64 kg)   01/04/22 140 lb 6.4 oz (63.7 kg)   12/28/21 140 lb 12.8 oz (63.9 kg)   12/20/21 140 lb 9.6 oz (63.8 kg)        Evaluation:  Wts assessed x past week: wt stable. Moderate fatigue reported. No change in nutrition alterations reported. Will reassess next week     Recommendations:  1. Will continue to monitor change in weights and nutrition alterations   2.  ONS as needed: [employer]        Lisa Seen, [address]  Office Number: [phone]

## 2022-01-21 ENCOUNTER — HOSPITAL ENCOUNTER (OUTPATIENT)
Dept: RADIATION ONCOLOGY | Facility: MEDICAL CENTER | Age: 67
Discharge: HOME | End: 2022-01-21
Attending: STUDENT IN AN ORGANIZED HEALTH CARE EDUCATION/TRAINING PROGRAM
Payer: MEDICARE

## 2022-01-21 PROCEDURE — 77014 PR CT GUIDANCE PLACEMENT RAD THERAPY FIELDS: CPT | Performed by: STUDENT IN AN ORGANIZED HEALTH CARE EDUCATION/TRAINING PROGRAM

## 2022-01-21 PROCEDURE — 77385 HC NTSTY MODUL RAD TX DLVR SMPL: CPT | Performed by: STUDENT IN AN ORGANIZED HEALTH CARE EDUCATION/TRAINING PROGRAM

## 2022-01-24 ENCOUNTER — HOSPITAL ENCOUNTER (OUTPATIENT)
Dept: RADIATION ONCOLOGY | Facility: MEDICAL CENTER | Age: 67
Discharge: HOME | End: 2022-01-24
Attending: STUDENT IN AN ORGANIZED HEALTH CARE EDUCATION/TRAINING PROGRAM
Payer: MEDICARE

## 2022-01-24 PROCEDURE — 77014 PR CT GUIDANCE PLACEMENT RAD THERAPY FIELDS: CPT | Performed by: STUDENT IN AN ORGANIZED HEALTH CARE EDUCATION/TRAINING PROGRAM

## 2022-01-24 PROCEDURE — 77385 HC NTSTY MODUL RAD TX DLVR SMPL: CPT | Performed by: STUDENT IN AN ORGANIZED HEALTH CARE EDUCATION/TRAINING PROGRAM

## 2022-01-25 ENCOUNTER — HOSPITAL ENCOUNTER (OUTPATIENT)
Dept: RADIATION ONCOLOGY | Facility: MEDICAL CENTER | Age: 67
Discharge: HOME | End: 2022-01-25
Attending: STUDENT IN AN ORGANIZED HEALTH CARE EDUCATION/TRAINING PROGRAM
Payer: MEDICARE

## 2022-01-25 VITALS
SYSTOLIC BLOOD PRESSURE: 150 MMHG | DIASTOLIC BLOOD PRESSURE: 106 MMHG | RESPIRATION RATE: 16 BRPM | OXYGEN SATURATION: 97 % | BODY MASS INDEX: 18.65 KG/M2 | WEIGHT: 137.5 LBS | TEMPERATURE: 96.2 F | HEART RATE: 115 BPM

## 2022-01-25 DIAGNOSIS — C61 MALIGNANT NEOPLASM OF PROSTATE (HCC): Primary | ICD-10-CM

## 2022-01-25 PROCEDURE — 77014 PR CT GUIDANCE PLACEMENT RAD THERAPY FIELDS: CPT | Performed by: STUDENT IN AN ORGANIZED HEALTH CARE EDUCATION/TRAINING PROGRAM

## 2022-01-25 PROCEDURE — 77385 HC NTSTY MODUL RAD TX DLVR SMPL: CPT | Performed by: STUDENT IN AN ORGANIZED HEALTH CARE EDUCATION/TRAINING PROGRAM

## 2022-01-25 ASSESSMENT — PAIN DESCRIPTION - DESCRIPTORS: DESCRIPTORS: SPASM

## 2022-01-25 ASSESSMENT — PAIN SCALES - GENERAL: PAINLEVEL_OUTOF10: 2

## 2022-01-25 ASSESSMENT — PAIN DESCRIPTION - PAIN TYPE: TYPE: ACUTE PAIN

## 2022-01-26 ENCOUNTER — HOSPITAL ENCOUNTER (OUTPATIENT)
Dept: RADIATION ONCOLOGY | Facility: MEDICAL CENTER | Age: 67
Discharge: HOME | End: 2022-01-26
Attending: STUDENT IN AN ORGANIZED HEALTH CARE EDUCATION/TRAINING PROGRAM
Payer: MEDICARE

## 2022-01-26 DIAGNOSIS — C61 MALIGNANT NEOPLASM OF PROSTATE (HCC): Primary | ICD-10-CM

## 2022-01-26 PROCEDURE — 77014 PR CT GUIDANCE PLACEMENT RAD THERAPY FIELDS: CPT | Performed by: STUDENT IN AN ORGANIZED HEALTH CARE EDUCATION/TRAINING PROGRAM

## 2022-01-26 PROCEDURE — 77427 RADIATION TX MANAGEMENT X5: CPT | Performed by: STUDENT IN AN ORGANIZED HEALTH CARE EDUCATION/TRAINING PROGRAM

## 2022-01-26 PROCEDURE — 77385 HC NTSTY MODUL RAD TX DLVR SMPL: CPT | Performed by: STUDENT IN AN ORGANIZED HEALTH CARE EDUCATION/TRAINING PROGRAM

## 2022-01-26 NOTE — PROGRESS NOTES
Radiation Oncology On-Treatment Visit:     Fraction # 44 / 44 (79.2 Gy)    Diagnosis:  Mr. Kal Carter is a 74 y.o. male with NCCN high-risk prostate adenocarcinoma ([id number], [place] 4+3, PSA 54.65). CT abdomen/ pelvis on 2021 and bone scan on 2021 negative for regional or distant metastasis. Patient is s/p monge catheter placement at the direction of Dr. Fiordaliza Hwang for persistent urinary obstruction.      Patient is s/p ADT with eligard 7.5 mg on 2021, 2021 and 2021. 22.5 mg on 10/1/2021, next injection scheduled for 2022. Treatment course: Definitive radiation to prostate and regional lymphatics    Progress note:  Mr. Kal Carter was seen and evaluated. Patient notes some increased cramping and bladder spasm lately. Otherwise doing well. Physical exam:   ECO Symptomatic but completely ambulatory   General: Elderly gentleman answering questions appropriately. Plan:  · Completed radiation treatment today. Post-treatment visit in 6 weeks with PSA prior. He scheduled to meet with Urology in 2 weeks for a monge exchanged. · Bladder spasm - continue oxybutynin 5 mg TID PRN. · UTI - completed 2 courses of antibiotics. · Diarrhea: Resolved. Imodium PRN. · I have checked the imaging performed to date, which confirm appropriate positioning.

## 2022-01-31 ENCOUNTER — HOSPITAL ENCOUNTER (OUTPATIENT)
Dept: CT IMAGING | Age: 67
Discharge: HOME | End: 2022-02-02
Payer: MEDICARE

## 2022-01-31 DIAGNOSIS — N20.0 KIDNEY STONE: ICD-10-CM

## 2022-01-31 PROCEDURE — 74176 CT ABD & PELVIS W/O CONTRAST: CPT

## 2022-02-09 ENCOUNTER — TELEPHONE (OUTPATIENT)
Dept: RADIATION ONCOLOGY | Facility: MEDICAL CENTER | Age: 67
End: 2022-02-09

## 2022-02-09 NOTE — TELEPHONE ENCOUNTER
Libby (Daughter) called to ask if Rogelio Wise should be taking Tylenol 500mg around the clock. I spoke with Dr Ludin Combs and we did not order tylenol for patient. He said oncology told him he could take it. Dr Ludin Combs said only take when in pain, not three times a day. [address] [facility] notified.

## 2022-02-09 NOTE — TELEPHONE ENCOUNTER
Spoke with Brisa(Daughter) Dr Manfred Somers would like to see Lennox Baldy. Scheduled follow up on 2/10/22 @ 9:30am

## 2022-02-10 ENCOUNTER — HOSPITAL ENCOUNTER (OUTPATIENT)
Dept: RADIATION ONCOLOGY | Facility: MEDICAL CENTER | Age: 67
Discharge: HOME | End: 2022-02-10
Attending: STUDENT IN AN ORGANIZED HEALTH CARE EDUCATION/TRAINING PROGRAM
Payer: MEDICARE

## 2022-02-10 VITALS
BODY MASS INDEX: 18.8 KG/M2 | WEIGHT: 138.6 LBS | DIASTOLIC BLOOD PRESSURE: 101 MMHG | SYSTOLIC BLOOD PRESSURE: 166 MMHG | RESPIRATION RATE: 18 BRPM | TEMPERATURE: 97.6 F | OXYGEN SATURATION: 98 % | HEART RATE: 112 BPM

## 2022-02-10 DIAGNOSIS — N13.8 BPH WITH OBSTRUCTION/LOWER URINARY TRACT SYMPTOMS: ICD-10-CM

## 2022-02-10 DIAGNOSIS — C61 MALIGNANT NEOPLASM OF PROSTATE (HCC): ICD-10-CM

## 2022-02-10 DIAGNOSIS — R39.15 URINARY URGENCY: Primary | ICD-10-CM

## 2022-02-10 DIAGNOSIS — N40.1 BPH WITH OBSTRUCTION/LOWER URINARY TRACT SYMPTOMS: ICD-10-CM

## 2022-02-10 PROCEDURE — 99211 OFF/OP EST MAY X REQ PHY/QHP: CPT | Performed by: STUDENT IN AN ORGANIZED HEALTH CARE EDUCATION/TRAINING PROGRAM

## 2022-02-10 RX ORDER — CIPROFLOXACIN 250 MG/1
500 TABLET, FILM COATED ORAL 2 TIMES DAILY
COMMUNITY
Start: 2022-02-01 | End: 2022-03-21 | Stop reason: ALTCHOICE

## 2022-02-10 RX ORDER — TRAMADOL HYDROCHLORIDE 50 MG/1
50 TABLET ORAL EVERY 8 HOURS PRN
Qty: 42 TABLET | Refills: 0 | Status: SHIPPED | OUTPATIENT
Start: 2022-02-10 | End: 2022-02-24

## 2022-02-10 RX ORDER — TAMSULOSIN HYDROCHLORIDE 0.4 MG/1
CAPSULE ORAL
COMMUNITY
Start: 2022-01-26

## 2022-02-10 ASSESSMENT — PAIN SCALES - GENERAL: PAINLEVEL_OUTOF10: 3

## 2022-02-10 ASSESSMENT — PAIN DESCRIPTION - DESCRIPTORS: DESCRIPTORS: ACHING

## 2022-02-10 ASSESSMENT — PAIN DESCRIPTION - PAIN TYPE: TYPE: ACUTE PAIN

## 2022-02-10 NOTE — PROGRESS NOTES
Tobi Palacios  2/10/2022  9:47 AM      Vitals:    02/10/22 0938   BP: (!) 166/101   Pulse: 112   Resp: 18   Temp: 97.6 °F (36.4 °C)   SpO2: 98%    :  Patient Currently in Pain: Yes  Pain Assessment: 0-10  Pain Level: 3       Wt Readings from Last 1 Encounters:   02/10/22 138 lb 9.6 oz (62.9 kg)                Current Outpatient Medications:     ciprofloxacin (CIPRO) 250 MG tablet, Take 500 mg by mouth 2 times daily, Disp: , Rfl:     tamsulosin (FLOMAX) 0.4 MG capsule, Not taking currently, Disp: , Rfl:     fexofenadine (MUCINEX ALLERGY) 180 MG tablet, Take 180 mg by mouth daily, Disp: , Rfl:     oxybutynin (DITROPAN) 5 MG tablet, Take 1 tablet by mouth 3 times daily as needed (bladder spasms), Disp: 90 tablet, Rfl: 3    NONFORMULARY, superbeta prostate BID , not taking currently, Disp: , Rfl:     sennosides-docusate sodium (SENOKOT-S) 8.6-50 MG tablet, Take 1 tablet by mouth daily, Disp: , Rfl:     CRANBERRY PO, Take by mouth daily 3 tabs daily, Disp: , Rfl:                FALLS RISK SCREEN  Instructions:  Assess the patient and enter the appropriate indicators that are present for fall risk identification. Total the numbers entered and assign a fall risk score from Table 2.  Reassess patient at a minimum every 12 weeks or with status change. Assessment   Date  2/10/2022     1. Mental Ability: confusion/cognitively impaired 0     2. Elimination Issues: incontinence, frequency 0       3. Ambulatory: use of assistive devices (walker, cane, off-loading devices),        attached to equipment (IV pole, oxygen) 2     4. Sensory Limitations: dizziness, vertigo, impaired vision 0     5. Age less than 65        0     6. Age 72 or greater 1     7. Medication: diuretics, strong analgesics, hypnotics, sedatives,        antihypertensive agents 3   8.   Falls:  recent history of falls within the last 3 months (not to include slipping or        tripping) 0   TOTAL 6    If score of 4 or greater was education given? Yes           TABLE 2   Risk Score Risk Level Plan of Care   0-3 Little or  No Risk 1. Provide assistance as indicated for ambulation activities  2. Reorient confused/cognitively impaired patient  3. Chair/bed in low position, stretcher/bed with siderails up except when performing patient care activities  5. Educate patient/family/caregiver on falls prevention  6.  Reassess in 12 weeks or with any noted change in patient condition which places them at a risk for a fall   4-6 Moderate Risk 1. Provide assistance as indicated for ambulation activities  2. Reorient confused/cognitively impaired patient  3. Chair/bed in low position, stretcher/bed with siderails up except when performing patient care activities  4. Educate patient/family/caregiver on falls prevention     7 or   Higher High Risk 1. Place patient in easily observable treatment room  2. Patient attended at all times by family member or staff  3. Provide assistance as indicated for ambulation activities  4. Reorient confused/cognitively impaired patient  5. Chair/bed in low position, stretcher/bed with siderails up except when performing patient care activities  6. Educate patient/family/caregiver on falls prevention         PLAN: Patient is seen today in follow up. Complaining of increased bladder spasms and pain. Saw Dr. Kevin Armstrong and he does have another UTI and was restarted on Cipro 500mg BID. Dr. Eliceo Blanc updated and examined pt. Per MD he will order 2 different medications. Ultram and Mobetriq. Pt appreciative and will follow up as previously scheduled. Pt will call back if he continues to have issues.            Nitin Rayo RN

## 2022-02-10 NOTE — PROGRESS NOTES
[address]            Radiation Oncology          [address] [facility]          [place], [address]        Julieta Keepers: [phone]        F: [phone]       [email or web address]         Date of Service: 2/10/2022     Location:  [facility]        RADIATION ONCOLOGY FOLLOW UP NOTE    Patient ID:   Lori Govea  : 1955   MRN: [id number]    DIAGNOSIS: high risk prostate cancer [id number] [place] 4+3 PSA 54.65    TREATMENT HISTORY: radiation therapy 79.2Gy/44fx completed 22 with concurrent ADT and planned long term ADT (last injection 22). INTERVAL HISTORY:   Lori Govea is a 74 y.o.. male who presents for follow the above diagnosis treatment history. He completed radiation on 2022. Since that time he is been having significant pain with bladder spasms. He saw urology last week and the Paige catheter was replaced. He says Tylenol is not helping much with discomfort associated with the bladder spasms. He is taking oxybutynin three times daily but isn't getting much relief from that either. PSA done yesterday was not detectable. He also received another lupron injection. MEDICATIONS:    Current Outpatient Medications:     mirabegron (MYRBETRIQ) 25 MG TB24, Take 1 tablet by mouth daily, Disp: 30 tablet, Rfl: 0    traMADol (ULTRAM) 50 MG tablet, Take 1 tablet by mouth every 8 hours as needed for Pain for up to 14 days. Intended supply: 14 days.  Take lowest dose possible to manage pain, Disp: 42 tablet, Rfl: 0    ciprofloxacin (CIPRO) 250 MG tablet, Take 500 mg by mouth 2 times daily, Disp: , Rfl:     tamsulosin (FLOMAX) 0.4 MG capsule, Not taking currently, Disp: , Rfl:     fexofenadine (MUCINEX ALLERGY) 180 MG tablet, Take 180 mg by mouth daily, Disp: , Rfl:     oxybutynin (DITROPAN) 5 MG tablet, Take 1 tablet by mouth 3 times daily as needed (bladder spasms), Disp: 90 tablet, Rfl: 3    NONFORMULARY, superbeta prostate BID , not taking currently, Disp: , Rfl:     sennosides-docusate sodium (SENOKOT-S) 8.6-50 MG tablet, Take 1 tablet by mouth daily, Disp: , Rfl:     CRANBERRY PO, Take by mouth daily 3 tabs daily, Disp: , Rfl:     ALLERGIES:  Allergies   Allergen Reactions    Latex     Macrobid [Nitrofurantoin]      High intolerance - projectile diarrhea         REVIEW OF SYSTEMS:    A full 14 point review of systems was performed and assessed and found to be negative except as noted above. PHYSICAL EXAMINATION:    VITAL SIGNS: BP (!) 166/101   Pulse 112   Temp 97.6 °F (36.4 °C) (Temporal)   Resp 18   Wt 138 lb 9.6 oz (62.9 kg)   SpO2 98%   BMI 18.80 kg/m²   GENERAL:  General appearance is that of a well-nourished, well-developed in no apparent distress. HEENT: Normocephalic, atraumatic, EOMI, moist mucosa, no erythema. Indirect exam .  NECK:  No adenopathy or a palpable thyroid mass, trachea is midline. LYMPHATICS: No cervical, supraclavicular, or axillary adenopathy. HEART:  Normal rate and regular rhythm, normal heart sounds. LUNGS:  Pulmonary effort normal. Normal breath sounds. LABS:  WBC   Date Value Ref Range Status   05/15/2021 10.3 3.5 - 11.3 k/uL Final     Segs Absolute   Date Value Ref Range Status   05/13/2021 9.40 (H) 1.50 - 8.10 k/uL Final     Hemoglobin   Date Value Ref Range Status   05/15/2021 8.2 (L) 13.0 - 17.0 g/dL Final     Platelets   Date Value Ref Range Status   05/15/2021 314 138 - 453 k/uL Final     No results found for: , CEA  PSA   Date Value Ref Range Status   05/15/2021 54.65 (H) <4.1 ug/L Final     Comment:     The Roche \"ECLIA\" assay is used. Results obtained with different assay methods cannot be   used interchangeably. 05/10/2021 43.54 (H) <4.1 ug/L Final     Comment:     The Roche \"ECLIA\" assay is used. Results obtained with different assay methods cannot be   used interchangeably.          IMAGING:   None new       ASSESSMENT AND PLAN:  Bulmaro Bonilla is a 74 y.o. male with high risk prostate cancer status post definitive radiation therapy with ADT and planned ADT who presents for follow up. He has a number of reasons to have an irritated bladder - stones, radiation completed recently, monge catheter and dealing with infection (antibiotics started yesterday) and I explained that the patient and family present today. I think the biggest thing to make the spasms better/go away is time; however, I told him we could try some medications to see if it can be made easier to deal with. Ill prescribe myrbetriq 25 mg daily to replace the oxybutynin TID. Also I'll send him tramadol to be used q8 hours as needed. He will start those medications today/tomorrow and we'll see how it goes. He will keep his scheduled follow up in about 4 weeks and we will reassess at that time. He knows to call at any time with any questions or concerns that may arise in the interim. Patient was in agreement with my recommendations. All questions were answered to their satisfaction. Patient was advised to contact us anytime should they have any questions or concerns. Electronically signed by Juma Santa MD on 2/10/2022 at 10:24 AM        Medications Prescribed:   New Prescriptions    MIRABEGRON (MYRBETRIQ) 25 MG TB24    Take 1 tablet by mouth daily    TRAMADOL (ULTRAM) 50 MG TABLET    Take 1 tablet by mouth every 8 hours as needed for Pain for up to 14 days. Intended supply: 14 days. Take lowest dose possible to manage pain       Orders: No orders of the defined types were placed in this encounter.       CC:  Patient Care Team:  Cole Gibson RN as Nurse Navigator (Oncology)

## 2022-02-25 ENCOUNTER — TELEPHONE (OUTPATIENT)
Dept: ONCOLOGY | Age: 67
End: 2022-02-25

## 2022-02-25 NOTE — TELEPHONE ENCOUNTER
Name: Felicitas Omer  : 1955  MRN: [id number]    Oncology Navigation Follow-Up Note    Contact Type:  Telephone    Notes: Writer called patient to check on him since completing radiation. He states he's doing well. He states he's still having bladder spasms but they are getting alittle bit better considering. Pt appreciative of call. Will continue to follow.     Electronically signed by Tressa Osorio RN on 2022 at 2:58 PM

## 2022-03-11 DIAGNOSIS — R39.15 URINARY URGENCY: ICD-10-CM

## 2022-03-14 DIAGNOSIS — R39.15 URINARY URGENCY: ICD-10-CM

## 2022-03-21 ENCOUNTER — HOSPITAL ENCOUNTER (OUTPATIENT)
Age: 67
Setting detail: SPECIMEN
Discharge: HOME | End: 2022-03-21

## 2022-03-21 ENCOUNTER — HOSPITAL ENCOUNTER (OUTPATIENT)
Dept: RADIATION ONCOLOGY | Facility: MEDICAL CENTER | Age: 67
Discharge: HOME | End: 2022-03-21
Attending: STUDENT IN AN ORGANIZED HEALTH CARE EDUCATION/TRAINING PROGRAM
Payer: MEDICARE

## 2022-03-21 VITALS
BODY MASS INDEX: 18.74 KG/M2 | RESPIRATION RATE: 20 BRPM | DIASTOLIC BLOOD PRESSURE: 72 MMHG | WEIGHT: 138.2 LBS | TEMPERATURE: 98.3 F | SYSTOLIC BLOOD PRESSURE: 119 MMHG | OXYGEN SATURATION: 97 % | HEART RATE: 104 BPM

## 2022-03-21 DIAGNOSIS — C61 MALIGNANT NEOPLASM OF PROSTATE (HCC): Primary | ICD-10-CM

## 2022-03-21 DIAGNOSIS — C61 MALIGNANT NEOPLASM OF PROSTATE (HCC): ICD-10-CM

## 2022-03-21 LAB
-: ABNORMAL
BACTERIA: ABNORMAL
BILIRUBIN URINE: NEGATIVE
COLOR: ABNORMAL
EPITHELIAL CELLS UA: ABNORMAL /HPF (ref 0–5)
GLUCOSE URINE: NEGATIVE
KETONES, URINE: ABNORMAL
LEUKOCYTE ESTERASE, URINE: ABNORMAL
NITRITE, URINE: NEGATIVE
PH UA: 7 (ref 5–8)
PROTEIN UA: ABNORMAL
RBC UA: ABNORMAL /HPF (ref 0–2)
SPECIFIC GRAVITY UA: 1.02 (ref 1–1.03)
TURBIDITY: ABNORMAL
URINE HGB: ABNORMAL
UROBILINOGEN, URINE: NORMAL
WBC UA: ABNORMAL /HPF (ref 0–5)

## 2022-03-21 PROCEDURE — 99212 OFFICE O/P EST SF 10 MIN: CPT | Performed by: STUDENT IN AN ORGANIZED HEALTH CARE EDUCATION/TRAINING PROGRAM

## 2022-03-21 RX ORDER — CIPROFLOXACIN 500 MG/1
500 TABLET, FILM COATED ORAL DAILY
Qty: 7 TABLET | Refills: 0 | Status: SHIPPED | OUTPATIENT
Start: 2022-03-21 | End: 2022-03-28

## 2022-03-21 NOTE — PROGRESS NOTES
Radiation Oncology Office Note    Diagnosis/Treatment History:   Mr. Jose Manuel Flowers is a 74 y.o. male with NCCN high-risk prostate adenocarcinoma ([id number], [place] 4+3, PSA 54.65). CT abdomen/ pelvis on 2021 and bone scan on 2021 negative for regional or distant metastasis. Patient is s/p monge catheter placement at the direction of Dr. Tom Calderón for persistent urinary obstruction. He completed definitive radiation to prostate and regional lymphatics - 79.2 Gy in 44 fractions - on 2022.    Aziza Castellanos is s/p ADT with eligard 7.5 mg on 2021, 2021 and 2021. 22.5 mg on 10/1/2021, 2022, next injection scheduled for 2022. Interval History:   Mr. Jose Manuel Flowres returns his daughter for routine follow-up. Overall, he reports that he is having more cramping and pain in the bladder. At our last visit, he was switched from oxybutynin to mirabegron, which he states is helping and has less side effects, but the pain is persistent. He uses tramadol 50 mg TID. He states his monge is leaking around his pens and he thinks its clogged. Urine is orange to yellow in color without blood. He denies fevers but has \"sweats\" at night. PSAs  05/15/2021 54.65  . ..  2021 8.95  2022 <0.06    Physical Examination:   /72   Pulse 104   Temp 98.3 °F (36.8 °C) (Temporal)   Resp 20   Wt 138 lb 3.2 oz (62.7 kg)   SpO2 97%   BMI 18.74 kg/m²   ECO Asymptomatic  CONSTITUTIONAL: Well developed, well nourished male. Alert and oriented x3. No acute distress. HEENT: Head normocephalic and atraumatic. Extraocular movements intact. NEUROLOGIC EXAM: Cranial nerves II through XII grossly intact. No focal neurologic deficit. Speech is fluent. Gait and posture are steady. PSYCHIATRIC:  Appropriate mood and affect for his clinical situation.     Assessment/Plan:  Mr. Jose Manuel Flowers appears to be doing well from an oncologic standpoint with undetectable PSA after definitve radiation and ADT for his high risk prostate cancer. The patient has an indwelling catheter causing bladder spasm, pain, and leakage. I advised him to continue the antispasmodics and pain medication. I will order a UA with reflex to culture to rule out a UTI. His daughter has reached out to Urology to schedule a monge exchange today. The patient is in discussion with his Urologist to potentially undergo a TURP or other procedure to help with his urinary obstruction issues. I will repeat a PSA in 3 months with a follow-up subsequently to continue to monitor the status of his prostate cancer. He will continue to follow-up with Urology regarding the other aforementioned issues. Orders Placed:     Orders Placed This Encounter   Procedures    Urinalysis with Reflex to Culture    PSA, Diagnostic     Total time spent on this case on the day of encounter is more than 15 minutes. This time includes combination of one or more of the following - review of necessary tests, review of pertinent medical records from the EMR, performing medically appropriate examination and evaluation, counseling and educating the patient/family/caregiver, ordering necessary medical tests, procedures etc., documenting the clinical information in the electronic medical record, care coordination, referring and communicating with other health care providers and interpretation of results independently.

## 2022-03-21 NOTE — PROGRESS NOTES
Jordy Mars  3/21/2022  9:07 AM      Vitals:    03/21/22 0903   BP: 119/72   Pulse: 104   Resp: 20   Temp: 98.3 °F (36.8 °C)   SpO2: 97%    :  Patient Currently in Pain: No             Wt Readings from Last 1 Encounters:   03/21/22 138 lb 3.2 oz (62.7 kg)                Current Outpatient Medications:     mirabegron (MYRBETRIQ) 25 MG TB24, Take 1 tablet by mouth daily, Disp: 30 tablet, Rfl: 11    ciprofloxacin (CIPRO) 250 MG tablet, Take 500 mg by mouth 2 times daily, Disp: , Rfl:     tamsulosin (FLOMAX) 0.4 MG capsule, Not taking currently, Disp: , Rfl:     fexofenadine (MUCINEX ALLERGY) 180 MG tablet, Take 180 mg by mouth daily, Disp: , Rfl:     oxybutynin (DITROPAN) 5 MG tablet, Take 1 tablet by mouth 3 times daily as needed (bladder spasms), Disp: 90 tablet, Rfl: 3    NONFORMULARY, superbeta prostate BID , not taking currently, Disp: , Rfl:     sennosides-docusate sodium (SENOKOT-S) 8.6-50 MG tablet, Take 1 tablet by mouth daily, Disp: , Rfl:     CRANBERRY PO, Take by mouth daily 3 tabs daily, Disp: , Rfl:     Immunizations:    Influenza status:    []   Current   [x]   Patient declined    Pneumococcal status:  []   Current  [x]   Patient declined  Covid status:   [x]  Dose #1:                     [x]  Dose #2:               []   Patient declined    Smoking Status:    [x] Smoker - PPD:   [] Nonsmoker - Quit Date:               [] Never a smoker      Cancer Screening:  Colonoscopy   [] Current       [] Not current   [] Not current, but scheduled   [x] NA  Mammogram   [] Current       [] Not current   [] Not current, but scheduled   [x] NA  Prostate           [x] Current       [] Not current   [] Not current, but scheduled   [] NA  PAP/Pelvic      [] Current       [] Not current   [] Not current, but scheduled   [x] NA  Skin                 [] Current       [] Not current   [] Not current, but scheduled   [x] NA     Hormone:  Lupron []   Last dose given:           Next dose due:   Eligard [x]   Last dose given: 2/9/22        Next dose due:   Aromatase Inhibitors []   Medication name:   N/A:  []           FALLS RISK SCREEN  Instructions:  Assess the patient and enter the appropriate indicators that are present for fall risk identification. Total the numbers entered and assign a fall risk score from Table 2.  Reassess patient at a minimum every 12 weeks or with status change. Assessment   Date  3/21/2022     1. Mental Ability: confusion/cognitively impaired 0     2. Elimination Issues: incontinence, frequency 0       3. Ambulatory: use of assistive devices (walker, cane, off-loading devices),        attached to equipment (IV pole, oxygen) 0     4. Sensory Limitations: dizziness, vertigo, impaired vision 0     5. Age less than 65        0     6. Age 72 or greater 1     7. Medication: diuretics, strong analgesics, hypnotics, sedatives,        antihypertensive agents 0   8. Falls:  recent history of falls within the last 3 months (not to include slipping or        tripping) 0   TOTAL 0    If score of 4 or greater was education given? No           TABLE 2   Risk Score Risk Level Plan of Care   0-3 Little or  No Risk 1. Provide assistance as indicated for ambulation activities  2. Reorient confused/cognitively impaired patient  3. Chair/bed in low position, stretcher/bed with siderails up except when performing patient care activities  5. Educate patient/family/caregiver on falls prevention  6.  Reassess in 12 weeks or with any noted change in patient condition which places them at a risk for a fall   4-6 Moderate Risk 1. Provide assistance as indicated for ambulation activities  2. Reorient confused/cognitively impaired patient  3. Chair/bed in low position, stretcher/bed with siderails up except when performing patient care activities  4. Educate patient/family/caregiver on falls prevention     7 or   Higher High Risk 1. Place patient in easily observable treatment room  2.   Patient attended at all times by family member or staff  3. Provide assistance as indicated for ambulation activities  4. Reorient confused/cognitively impaired patient  5. Chair/bed in low position, stretcher/bed with siderails up except when performing patient care activities  6. Educate patient/family/caregiver on falls prevention         PLAN: Patient is seen today in follow up from Radiation Therapy for prostate cancer. PSA 0.0 on 2/9/22. Has indwelling monge. On Eligard and Casodex. Daughter states much sediment and mucus in monge. Patient complains of painful bladder spasms, abdominal pressure and leaking around catheter. Dr. Arita Lefort notified of assessment and examined patient. Patient to go to lab after this appointment and have UA. Daughter to get him an appointment with Dr. Sherman Velásquez today for possible catheter change. He is to follow up in 3 months with PSA.         Mary Novoa RN

## 2022-03-25 LAB
CULTURE: ABNORMAL
CULTURE: ABNORMAL
SPECIMEN DESCRIPTION: ABNORMAL

## 2022-05-13 ENCOUNTER — TELEPHONE (OUTPATIENT)
Dept: ONCOLOGY | Age: 67
End: 2022-05-13

## 2022-06-16 ENCOUNTER — HOSPITAL ENCOUNTER (OUTPATIENT)
Age: 67
Setting detail: SPECIMEN
Discharge: HOME | End: 2022-06-16

## 2022-06-16 DIAGNOSIS — C61 MALIGNANT NEOPLASM OF PROSTATE (HCC): ICD-10-CM

## 2022-06-16 LAB — PROSTATE SPECIFIC ANTIGEN: <0.02 NG/ML

## 2022-06-24 ENCOUNTER — TELEPHONE (OUTPATIENT)
Dept: ONCOLOGY | Age: 67
End: 2022-06-24

## 2022-06-24 ENCOUNTER — HOSPITAL ENCOUNTER (OUTPATIENT)
Dept: RADIATION ONCOLOGY | Facility: MEDICAL CENTER | Age: 67
Discharge: HOME | End: 2022-06-24
Attending: STUDENT IN AN ORGANIZED HEALTH CARE EDUCATION/TRAINING PROGRAM
Payer: MEDICARE

## 2022-06-24 VITALS
RESPIRATION RATE: 16 BRPM | WEIGHT: 140.4 LBS | HEART RATE: 97 BPM | BODY MASS INDEX: 19.04 KG/M2 | DIASTOLIC BLOOD PRESSURE: 100 MMHG | SYSTOLIC BLOOD PRESSURE: 172 MMHG | TEMPERATURE: 97.6 F | OXYGEN SATURATION: 98 %

## 2022-06-24 DIAGNOSIS — C61 MALIGNANT NEOPLASM OF PROSTATE (HCC): Primary | ICD-10-CM

## 2022-06-24 PROCEDURE — 99212 OFFICE O/P EST SF 10 MIN: CPT | Performed by: STUDENT IN AN ORGANIZED HEALTH CARE EDUCATION/TRAINING PROGRAM

## 2022-06-24 NOTE — PROGRESS NOTES
Tennille Cash  6/24/2022  10:23 AM      Vitals:    06/24/22 1010   BP: (!) 172/100   Pulse: 97   Resp: 16   Temp: 97.6 °F (36.4 °C)   SpO2: 98%    :     Pain Assessment: None - Denies Pain          Wt Readings from Last 1 Encounters:   06/24/22 140 lb 6.4 oz (63.7 kg)                Current Outpatient Medications:     tamsulosin (FLOMAX) 0.4 MG capsule, Not taking currently (Patient not taking: Reported on 6/24/2022), Disp: , Rfl:     fexofenadine (MUCINEX ALLERGY) 180 MG tablet, Take 180 mg by mouth daily, Disp: , Rfl:     oxybutynin (DITROPAN) 5 MG tablet, Take 1 tablet by mouth 3 times daily as needed (bladder spasms) (Patient not taking: Reported on 6/24/2022), Disp: 90 tablet, Rfl: 3    NONFORMULARY, superbeta prostate BID , not taking currently, Disp: , Rfl:     sennosides-docusate sodium (SENOKOT-S) 8.6-50 MG tablet, Take 1 tablet by mouth daily, Disp: , Rfl:     CRANBERRY PO, Take by mouth daily 3 tabs daily, Disp: , Rfl:     Immunizations:    Influenza status:    []   Current   [x]   Patient declined    Pneumococcal status:  []   Current  [x]   Patient declined  Covid status:   [x]  Dose #1:                     [x]  Dose #2:               []   Patient declined    Smoking Status:    [x] Smoker - PPD: 2PPD   [] Nonsmoker - Quit Date:               [] Never a smoker      Cancer Screening:  Colonoscopy   [] Current       [] Not current   [] Not current, but scheduled   [x] NA  Mammogram   [] Current       [] Not current   [] Not current, but scheduled   [x] NA  Prostate           [x] Current       [] Not current   [] Not current, but scheduled   [] NA  PAP/Pelvic      [] Current       [] Not current   [] Not current, but scheduled   [] NA  Skin                 [] Current       [] Not current   [] Not current, but scheduled   [x] NA     Hormone:  Lupron []   Last dose given:           Next dose due:   Eligard [x]   Last dose given: 4/2022          Next dose due:   Aromatase Inhibitors []   Medication name: N/A:  [x]           FALLS RISK SCREEN  Instructions:  Assess the patient and enter the appropriate indicators that are present for fall risk identification. Total the numbers entered and assign a fall risk score from Table 2.  Reassess patient at a minimum every 12 weeks or with status change. Assessment   Date  6/24/2022     1. Mental Ability: confusion/cognitively impaired 0     2. Elimination Issues: incontinence, frequency 0       3. Ambulatory: use of assistive devices (walker, cane, off-loading devices),        attached to equipment (IV pole, oxygen) 0     4. Sensory Limitations: dizziness, vertigo, impaired vision 0     5. Age less than 65        0     6. Age 72 or greater 1     7. Medication: diuretics, strong analgesics, hypnotics, sedatives,        antihypertensive agents 0   8. Falls:  recent history of falls within the last 3 months (not to include slipping or        tripping) 0   TOTAL 1    If score of 4 or greater was education given? No           TABLE 2   Risk Score Risk Level Plan of Care   0-3 Little or  No Risk 1. Provide assistance as indicated for ambulation activities  2. Reorient confused/cognitively impaired patient  3. Chair/bed in low position, stretcher/bed with siderails up except when performing patient care activities  5. Educate patient/family/caregiver on falls prevention  6.  Reassess in 12 weeks or with any noted change in patient condition which places them at a risk for a fall   4-6 Moderate Risk 1. Provide assistance as indicated for ambulation activities  2. Reorient confused/cognitively impaired patient  3. Chair/bed in low position, stretcher/bed with siderails up except when performing patient care activities  4. Educate patient/family/caregiver on falls prevention     7 or   Higher High Risk 1. Place patient in easily observable treatment room  2. Patient attended at all times by family member or staff  3.   Provide assistance as indicated for ambulation activities  4. Reorient confused/cognitively impaired patient  5. Chair/bed in low position, stretcher/bed with siderails up except when performing patient care activities  6. Educate patient/family/caregiver on falls prevention         PLAN: Patient is seen today in follow up from radiation therapy for prostate cancer. States monge catheter removed in April. States still having some difficulty adjusting to life without the catheter. Denies pain or headache. Blood pressure elevated. Encouraged patient to discuss with primary healthcare provider. Dr. Kiana Manuel notified of assessment and examined patient. He will follow up in 6 months with PSA.         Alfred Tuttle RN

## 2022-06-24 NOTE — TELEPHONE ENCOUNTER
Name: Case Duarte  : 1955  MRN: <[id number]>    Oncology Navigation Follow-Up Note    Contact Type:  Telephone    Notes: Writer called patients daughter Sonu Skinner to inform her I would be ending navigation as pt has reached remission. Writer did encourage her and her father to keep my contact information for any future needs or concerns. Brisa henriquez.     Electronically signed by Marshall Johnson RN on 2022 at 3:44 PM

## 2022-06-24 NOTE — PROGRESS NOTES
Radiation Oncology Office Note    Diagnosis/Treatment History:   Mr. Jaswinder Mary is a 74 y.o. male with NCCN high-risk prostate adenocarcinoma ([id number], Sánchez 4+3, PSA 54.65). CT abdomen/ pelvis on 2021 and bone scan on 2021 negative for regional or distant metastasis. Patient was s/p monge catheter placement at the direction of Dr. Laisha Campa for persistent urinary obstruction, maintained throughout treatment. He completed definitive radiation to prostate and regional lymphatics - 79.2 Gy in 44 fractions - on 2022. Patient is s/p ADT with eligard 7.5 mg on 2021, 2021 and 2021. 22.5 mg on 10/1/2021, 2022, 5/10/2022, next injection scheduled for 2022. Interval History:   Mr. Jaswinder Mary returns for routine follow-up. His monge catheter was removed in May. He is now urinating on his own, with some frequency at night. AUA score of 16. Denies unintentional weigh loss, hot flashes, or bone pain. PSAs  05/15/2021 54.65  . ..  2021 8.95  2022 <0.06  2022 <0.02    Physical Examination:   BP (!) 172/100   Pulse 97   Temp 97.6 °F (36.4 °C) (Temporal)   Resp 16   Wt 140 lb 6.4 oz (63.7 kg)   SpO2 98%   BMI 19.04 kg/m²   ECO Asymptomatic  CONSTITUTIONAL: Well developed, well nourished male. Alert and oriented x3. No acute distress. HEENT: Head normocephalic and atraumatic. Extraocular movements intact. NEUROLOGIC EXAM: Cranial nerves II through XII grossly intact. No focal neurologic deficit. Speech is fluent. Gait and posture are steady. PSYCHIATRIC:  Appropriate mood and affect for his clinical situation. Assessment/Plan:  Mr. Jaswinder Mary appears to be doing well from an oncologic standpoint with undetectable PSA after definitve radiation and ADT for his high risk prostate cancer. I will repeat a PSA in 6months with a follow-up subsequently to continue to monitor the status of his prostate cancer.  He will continue to follow-up with Urology to receive ADT (total planned 1.5-2 years per NCCN guidelines). Orders Placed:     Orders Placed This Encounter   Procedures    PSA, Diagnostic     Total time spent on this case on the day of encounter is more than 15 minutes, with over 50% of this visit was spent on counseling and coordinating care. This time includes combination of one or more of the following - review of necessary tests, review of pertinent medical records from the EMR, performing medically appropriate examination and evaluation, counseling and educating the patient/family/caregiver, ordering necessary medical tests, procedures etc., documenting the clinical information in the electronic medical record, care coordination, referring and communicating with other health care providers and interpretation of results independently.

## 2022-09-05 NOTE — PROGRESS NOTES
Sintia Leather  1/25/2022  Wt Readings from Last 3 Encounters:   01/25/22 137 lb 8 oz (62.4 kg)   01/18/22 141 lb 3.2 oz (64 kg)   01/11/22 141 lb 3.2 oz (64 kg)     Body mass index is 18.65 kg/m². Patient here for OTV. Patient states lots of gas and bladder spasms that happen frequently. Dr Dari Mendieta will see patient tomorrow per request of patient. Treatment Area:prostate    Patient was seen today for weekly visit. Comfort Alteration    Fatigue: Moderate    Nutritional Alteration  Anorexia: No  Nausea: No  Vomiting: No     Elimination Alterations  Constipation: yes  Diarrhea:  no  Urinary Frequency/Urgency: Yes  Urinary Retention: No  Dysuria: No  Urinary Incontinence: Yes  Proctitis: No  Nocturia: Yes #/night: catheter in place     Emotional  Coping: effective      Skin Alteration   Sensation:none    Radiation Dermatitis:  Intact [x]     Erythema  []     Discoloration  []     Rash []     Dry desquamation  []     Moist desquamation []           Injury, potential bleeding or infection: none    Lab Results   Component Value Date    WBC 10.3 05/15/2021     05/15/2021         BP (!) 150/106 Comment: asymptomatic. Pulse 115   Temp 96.2 °F (35.7 °C) (Temporal)   Resp 16   Wt 137 lb 8 oz (62.4 kg)   SpO2 97%   BMI 18.65 kg/m²   Patient Currently in Pain: Yes  Pain Assessment: 0-10  Pain Level: 2         Assessment/Plan: Patient was seen today for weekly visit.       Valeria Combs RN
Lithium 600mg BID, Zyprexa 20mg BID Klonopin 0.5mg BID; Prolixin 5mg BID x 2 weeks Prolixin Decanoate 12.5mg IM Q2 weeks; Haldol, risperdal, Abilify, Geodon, thorazine, depakote

## 2022-10-27 ENCOUNTER — TELEPHONE (OUTPATIENT)
Dept: RADIATION ONCOLOGY | Facility: MEDICAL CENTER | Age: 67
End: 2022-10-27

## 2022-10-27 NOTE — TELEPHONE ENCOUNTER
I called Moises Lopez to change his follow up. I spoke with his daughter and changed his follow up to 12/9/2022 @ 11:15am. I mailed his order for PSA also.

## 2022-11-01 ENCOUNTER — HOSPITAL ENCOUNTER (OUTPATIENT)
Age: 67
Setting detail: SPECIMEN
Discharge: HOME | End: 2022-11-01

## 2022-11-01 DIAGNOSIS — C61 MALIGNANT NEOPLASM OF PROSTATE (HCC): ICD-10-CM

## 2022-11-01 LAB — PROSTATE SPECIFIC ANTIGEN: <0.02 NG/ML

## 2022-12-09 ENCOUNTER — HOSPITAL ENCOUNTER (OUTPATIENT)
Dept: RADIATION ONCOLOGY | Facility: MEDICAL CENTER | Age: 67
Discharge: HOME | End: 2022-12-09
Attending: STUDENT IN AN ORGANIZED HEALTH CARE EDUCATION/TRAINING PROGRAM

## 2022-12-09 VITALS
DIASTOLIC BLOOD PRESSURE: 98 MMHG | BODY MASS INDEX: 20.67 KG/M2 | RESPIRATION RATE: 18 BRPM | OXYGEN SATURATION: 98 % | TEMPERATURE: 97.1 F | SYSTOLIC BLOOD PRESSURE: 183 MMHG | WEIGHT: 152.4 LBS | HEART RATE: 97 BPM

## 2022-12-09 NOTE — PROGRESS NOTES
[facility]            Radiation Oncology          [facility], [facility]       O: [phone]       [email or web address]           Date of Service: 2022     Location:  [facility],   [address] [place]   [phone]       RADIATION ONCOLOGY FOLLOW UP NOTE    Patient ID:   Mikayla Moses  : 1955   MRN: [id number]    DIAGNOSIS:  Cancer Staging  No matching staging information was found for the patient. INTERVAL HISTORY:   Mikayla Moses is a 69 y.o.. male with with a history of prostatic cancer treated within the year. He had a PSA of about 40. He completed treatment earlier this year. PSA done on 2022 was down to less than 0.02. He denies any significant urinary symptoms. He denies any gastrointestinal symptoms. Activity level is good.   He denies any symptoms of distant metastases; E.  G headache, blurred vision, cough, shortness of breath, bony pain, or weight loss\      AUA Score:     MEDICATIONS:    Current Outpatient Medications:     tamsulosin (FLOMAX) 0.4 MG capsule, Not taking currently (Patient not taking: Reported on 2022), Disp: , Rfl:     fexofenadine (MUCINEX ALLERGY) 180 MG tablet, Take 180 mg by mouth daily, Disp: , Rfl:     oxybutynin (DITROPAN) 5 MG tablet, Take 1 tablet by mouth 3 times daily as needed (bladder spasms) (Patient not taking: Reported on 2022), Disp: 90 tablet, Rfl: 3    NONFORMULARY, superbeta prostate BID , not taking currently, Disp: , Rfl:     sennosides-docusate sodium (SENOKOT-S) 8.6-50 MG tablet, Take 1 tablet by mouth daily, Disp: , Rfl:     CRANBERRY PO, Take by mouth daily 3 tabs daily, Disp: , Rfl:     ALLERGIES:  Allergies   Allergen Reactions    Latex     Macrobid [Nitrofurantoin]      High intolerance - projectile diarrhea         REVIEW OF SYSTEMS:    A full 14 point review of systems was performed and assessed and found to be negative except as noted above. PHYSICAL EXAMINATION:    CHAPERONE: Not Required    ECO Asymptomatic    VITAL SIGNS: BP (!) 183/98   Pulse 97   Temp 97.1 °F (36.2 °C) (Temporal)   Resp 18   Wt 152 lb 6.4 oz (69.1 kg)   SpO2 98%   BMI 20.67 kg/m²   GENERAL:  General appearance is that of a well-nourished, well-developed in no apparent distress. HEENT: Normocephalic, atraumatic, EOMI, moist mucosa, no erythema. Indirect exam .  NECK:  No adenopathy or a palpable thyroid mass, trachea is midline. LYMPHATICS: No cervical, supraclavicular, or axillary adenopathy. HEART:  Normal rate and regular rhythm, normal heart sounds. LUNGS:  Pulmonary effort normal. Normal breath sounds. ABDOMEN:  Soft, nontender, non distended, and no hepatosplenomegaly. EXTREMITIES:  No edema. No calf tenderness. MSK:  No spinal tenderness. Normal ROM. NEUROLOGICAL: Alert and oriented. Strength and sensation intact bilaterally. No focal deficits. PSYCH: Mood normal, behavior normal.  SKIN: No erythema, no desquamation. RECTAL: Deferred per patient  GYN: Deferred   BREAST: Deferred       LABS:  WBC   Date Value Ref Range Status   05/15/2021 10.3 3.5 - 11.3 k/uL Final     Segs Absolute   Date Value Ref Range Status   2021 9.40 (H) 1.50 - 8.10 k/uL Final     Hemoglobin   Date Value Ref Range Status   05/15/2021 8.2 (L) 13.0 - 17.0 g/dL Final     Platelets   Date Value Ref Range Status   05/15/2021 314 138 - 453 k/uL Final     No results found for: , CEA  PSA   Date Value Ref Range Status   2022 <0.02 <4.1 ng/mL Final     Comment:     The Roche \"ECLIA\" assay is used. Results obtained with different assay methods cannot be   used interchangeably. 2022 <0.02 <4.1 ng/mL Final     Comment:     The Roche \"ECLIA\" assay is used. Results obtained with different assay methods cannot be   used interchangeably. 05/15/2021 54.65 (H) <4.1 ug/L Final     Comment:     The Roche \"ECLIA\" assay is used. Results obtained with different assay methods cannot be   used interchangeably. 05/10/2021 43.54 (H) <4.1 ug/L Final     Comment:     The Roche \"ECLIA\" assay is used. Results obtained with different assay methods cannot be   used interchangeably. IMAGING:   na      ASSESSMENT AND PLAN:  Adriana Machado is a 69 y.o. male with a Cancer Staging  No matching staging information was found for the patient. .    Doing well as per above. He is basically undetectable. He does not wish to follow in [place], but will follow with Dr. Rios Ruano. Advised  this gentleman that he is more than welcome in our center if indeed the need arises in the future. Patient is recommended to come back in on a prn basis. months for another follow up visit and exam, or can call to come see us sooner if symptoms change. Patient was in agreement with my recommendations. All questions were answered to their satisfaction. Patient was advised to contact us anytime should they have any questions or concerns. Electronically signed by Romy Haney MD on 12/9/2022 at 1:14 PM        Medications Prescribed:   New Prescriptions    No medications on file       Orders: No orders of the defined types were placed in this encounter. CC:  Patient Care Team:  Lulu Sheikh MD as Consulting Physician (Radiation Oncology)  Rashmi Dobson MD as Consulting Physician (Urology)     Total time spent on this case on the day of encounter is 30 minutes.  This time includes combination of one or more of the following - review of necessary tests, review of pertinent medical records from the EMR, performing medically appropriate examination and evaluation, counseling and educating the patient/family/caregiver, ordering necessary medical tests, procedures etc., documenting the clinical information in the electronic medical record, care coordination, referring and communicating with other health care providers and interpretation of results independently. This note is created with the assistance of a speech recognition program.  While intending to generate a document that actually reflects the content of the visit, the document can still have some errors including those of syntax and sound a like substitutions which may escape proof reading. It such instances, actual meaning can be extrapolated by contextual diversion.

## 2025-06-25 NOTE — OP NOTE
Operative Note      Patient: Sammie Boo  YOB: 1955  MRN: [id number]    Date of Procedure: 5/12/2021    Pre-Op Diagnosis: BIRD HYDRONEPHROSIS, enlarged prostate, gross hematuria, clot retention  Post-Op Diagnosis: Same and Severe hemorrhagic cystitis thickened bladder wall       Procedure(s):  CYSTOSCOPY BLADDER IRRIGATION CLOT EVACUATION    Surgeon(s):  Jacky Hardwick MD    Assistant:   * No surgical staff found *    Anesthesia: General    Estimated Blood Loss (mL): 100 mL blood clots removed    Complications: None    Specimens:   ID Type Source Tests Collected by Time Destination   1 : CYSTO URINE Urine Urine, Cystoscopic FISH, UROVYSION, URINE RT REFLEX TO CULTURE Jacky Hardwick MD 5/12/2021 1646    A : BLADDER CLOTS Tissue Bladder SURGICAL PATHOLOGY Jacky Hardwick MD 5/12/2021 1655        Implants:  * No implants in log *      Drains:   [REMOVED] Urethral Catheter Triple-lumen (Removed)   Catheter Indications Urinary retention (acute or chronic), continuous bladder irrigation or bladder outlet obstruction 05/12/21 1558   Site Assessment No urethral drainage 05/12/21 1558   Urine Color Pink 05/12/21 1558   Urine Appearance Clear 05/12/21 1558       Findings: Indications: 69-year-old male was seen in consultation with gross hematuria bilateral hydronephrosis acute kidney injury the patient had a markedly distended bladder, there was some improvement after bladder decompression but bilateral hydronephrosis persisted and he continued to have active bleeding and passing clots. Patient was scheduled for cystoscopy bilateral retrograde pyelogram    Detailed Description of Procedure:   Patient was brought to the cystoscopy suite positioned in dorsolithotomy, proper patient identification procedure identification prepping and draping in the usual sterile manner. We entered the bladder with the cystoscope, markedly enlarged prostate with prostate congestion identified.     The anterior the bladder was carefully examined there was significant amount of blood clots still present in the bladder about 150 mL of clots. Thorough bladder irrigation and clot evacuation carried out until clear. Careful examination of the urinary bladder demonstrates severe hemorrhagic cystitis and insignificant thickening of the bladder mucosa at the bladder wall demonstrated during cystoscopy. The ureteral orifices were visualized because of the significant bladder inflammation we did not identify a bladder tumor the remainder of the bladder examination was completed and the cystoscope was removed blood clots were sent to pathology. Urine specimen was also sent for UroVysion FISH test.    At the completion a three-way Paige catheter was inserted and the patient returned to recovery room in stable condition. Impression gross hematuria severe hemorrhagic cystitis thickened bladder wall bilateral hydronephrosis. Plan maintain the indwelling Paige, monitor renal function, if the renal function does not improve with the indwelling Paige catheter patient may require bilateral nephrostomies and nephroureteral catheters. We will continue with hydration and will repeat renal function studies in a.m.     Electronically signed by Madeline Smith MD on 5/12/2021 at 5:02 PM Detail Level: Generalized Detail Level: Zone Detail Level: Simple Detail Level: Detailed

## (undated) DEVICE — Device

## (undated) DEVICE — TUR/ENDOSCOPIC CABLE, 10' (3.05 M): Brand: CONMED

## (undated) DEVICE — BAG,LEG,COMFORT-STRAPS,LARGE,32OZ: Brand: MEDLINE

## (undated) DEVICE — GLOVE SURG SZ 7 CRM LTX FREE POLYISOPRENE POLYMER BEAD ANTI

## (undated) DEVICE — SYRINGE MED 50ML LUERLOCK TIP

## (undated) DEVICE — CO2 CANNULA,SUPERSOFT, ADLT,7'O2,7'CO2: Brand: MEDLINE

## (undated) DEVICE — CONTAINER,SPECIMEN,OR STERILE,4OZ: Brand: MEDLINE

## (undated) DEVICE — Z INACTIVE USE 2635503 SOLUTION IRRIG 3000ML ST H2O USP UROMATIC PLAS CONT

## (undated) DEVICE — MASTISOL ADHESIVE LIQ 2/3ML

## (undated) DEVICE — Z INACTIVE USE 2635504 SOLUTION IRRIG 3000ML 1.5% GL USP UROMATIC CONT

## (undated) DEVICE — TUBING, SUCTION, 1/4" X 12', STRAIGHT: Brand: MEDLINE

## (undated) DEVICE — GOWN,AURORA,NONREINFORCED,LARGE: Brand: MEDLINE

## (undated) DEVICE — FORCEPS BX L240CM JAW DIA2.2MM RAD JAW 4 HOT DISP

## (undated) DEVICE — STRIP,CLOSURE,WOUND,MEDI-STRIP,1/2X4: Brand: MEDLINE

## (undated) DEVICE — Device: Brand: DEFENDO VALVE AND CONNECTOR KIT

## (undated) DEVICE — DRAPE URO FNGR SURG CYSTO STD N FEN REINF W/O FLD PCH

## (undated) DEVICE — ELECTRODE PT RET AD L9FT HI MOIST COND ADH HYDRGEL CORDED

## (undated) DEVICE — CATHETER URET 5FR L70CM TIP 8FR OPN END CONE TIP INJ HUB

## (undated) DEVICE — CATHETER,FOLEY,3WAY,SILI-ELAST,22FR,30ML: Brand: MEDLINE

## (undated) DEVICE — Y-TYPE TUR/BLADDER IRRIGATION SET, REGULATING CLAMP

## (undated) DEVICE — BASIN EMSIS 700ML GRAPHITE PLAS KID SHP GRAD

## (undated) DEVICE — SYRINGE MED 30ML STD CLR PLAS LUERLOCK TIP N CTRL DISP

## (undated) DEVICE — MEDICINE CUP, GRADUATED, STER: Brand: MEDLINE

## (undated) DEVICE — ADAPTER TBNG LUER STUB 15 GA INTMED

## (undated) DEVICE — CONE TIP URETERAL CATHETER WITH OPEN-END: Brand: CONE TIP

## (undated) DEVICE — STRAP,CATHETER,ELASTIC,HOOK&LOOP: Brand: MEDLINE

## (undated) DEVICE — SYRINGE 20ML LL S/C 50